# Patient Record
Sex: FEMALE | Race: BLACK OR AFRICAN AMERICAN | Employment: OTHER | URBAN - METROPOLITAN AREA
[De-identification: names, ages, dates, MRNs, and addresses within clinical notes are randomized per-mention and may not be internally consistent; named-entity substitution may affect disease eponyms.]

---

## 2017-01-16 PROBLEM — S32.000A CLOSED COMPRESSION FRACTURE OF LUMBAR VERTEBRA (HCC): Status: ACTIVE | Noted: 2017-01-16

## 2017-03-24 PROBLEM — S32.000A CLOSED COMPRESSION FRACTURE OF LUMBAR VERTEBRA (HCC): Status: ACTIVE | Noted: 2017-03-24

## 2017-03-27 ENCOUNTER — TELEPHONE (OUTPATIENT)
Dept: PHARMACY | Facility: CLINIC | Age: 82
End: 2017-03-27

## 2017-08-01 PROBLEM — E11.9 TYPE 2 DIABETES MELLITUS WITHOUT COMPLICATION, WITHOUT LONG-TERM CURRENT USE OF INSULIN (HCC): Status: ACTIVE | Noted: 2017-08-01

## 2017-08-16 PROBLEM — E11.9 TYPE 2 DIABETES MELLITUS WITHOUT COMPLICATION, WITHOUT LONG-TERM CURRENT USE OF INSULIN (HCC): Chronic | Status: ACTIVE | Noted: 2017-08-01

## 2017-11-06 PROBLEM — M19.90 OSTEOARTHRITIS: Status: ACTIVE | Noted: 2017-11-06

## 2018-01-12 ENCOUNTER — CARE COORDINATION (OUTPATIENT)
Dept: CARE COORDINATION | Age: 83
End: 2018-01-12

## 2018-01-12 NOTE — CARE COORDINATION
Attempted to reach patient for ed follow up, no answer, message states person you are trying to reach is unavailable no voicemail available

## 2018-04-11 ENCOUNTER — CARE COORDINATION (OUTPATIENT)
Dept: CARE COORDINATION | Age: 83
End: 2018-04-11

## 2018-05-09 ENCOUNTER — CARE COORDINATION (OUTPATIENT)
Dept: CARE COORDINATION | Age: 83
End: 2018-05-09

## 2018-05-21 ENCOUNTER — HOSPITAL ENCOUNTER (EMERGENCY)
Age: 83
Discharge: HOME | End: 2018-05-21
Payer: MEDICAID

## 2018-05-21 VITALS
TEMPERATURE: 98 F | WEIGHT: 120 LBS | HEART RATE: 101 BPM | SYSTOLIC BLOOD PRESSURE: 179 MMHG | DIASTOLIC BLOOD PRESSURE: 99 MMHG | OXYGEN SATURATION: 96 % | BODY MASS INDEX: 21.26 KG/M2 | RESPIRATION RATE: 18 BRPM

## 2018-05-21 DIAGNOSIS — H01.113 ALLERGIC CONTACT DERMATITIS OF EYELIDS OF BOTH EYES: Primary | ICD-10-CM

## 2018-05-21 DIAGNOSIS — H01.116 ALLERGIC CONTACT DERMATITIS OF EYELIDS OF BOTH EYES: Primary | ICD-10-CM

## 2018-05-21 PROCEDURE — 99283 EMERGENCY DEPT VISIT LOW MDM: CPT

## 2018-05-21 PROCEDURE — 6370000000 HC RX 637 (ALT 250 FOR IP): Performed by: NURSE PRACTITIONER

## 2018-05-21 RX ORDER — METHYLPREDNISOLONE 4 MG/1
TABLET ORAL
Qty: 21 TABLET | Status: SHIPPED | OUTPATIENT
Start: 2018-05-21 | End: 2018-05-27

## 2018-05-21 RX ORDER — DIPHENHYDRAMINE HCL 25 MG
25 CAPSULE ORAL EVERY 6 HOURS PRN
Qty: 21 CAPSULE | Refills: 0 | Status: SHIPPED | OUTPATIENT
Start: 2018-05-21 | End: 2018-05-31

## 2018-05-21 RX ORDER — DIPHENHYDRAMINE HCL 25 MG
25 TABLET ORAL ONCE
Status: COMPLETED | OUTPATIENT
Start: 2018-05-21 | End: 2018-05-21

## 2018-05-21 RX ORDER — ERYTHROMYCIN 5 MG/G
OINTMENT OPHTHALMIC
Qty: 1 TUBE | Refills: 0 | Status: SHIPPED | OUTPATIENT
Start: 2018-05-21 | End: 2018-05-31

## 2018-05-21 RX ORDER — PREDNISONE 20 MG/1
40 TABLET ORAL ONCE
Status: COMPLETED | OUTPATIENT
Start: 2018-05-21 | End: 2018-05-21

## 2018-05-21 RX ADMIN — DIPHENHYDRAMINE HCL 25 MG: 25 TABLET ORAL at 16:32

## 2018-05-21 RX ADMIN — PREDNISONE 40 MG: 20 TABLET ORAL at 16:32

## 2018-05-24 ENCOUNTER — CARE COORDINATION (OUTPATIENT)
Dept: CARE COORDINATION | Age: 83
End: 2018-05-24

## 2018-05-29 ENCOUNTER — CARE COORDINATION (OUTPATIENT)
Dept: CARE COORDINATION | Age: 83
End: 2018-05-29

## 2018-06-14 ENCOUNTER — TELEPHONE (OUTPATIENT)
Dept: INTERNAL MEDICINE | Age: 83
End: 2018-06-14

## 2018-06-14 ENCOUNTER — CARE COORDINATION (OUTPATIENT)
Dept: CARE COORDINATION | Age: 83
End: 2018-06-14

## 2018-07-12 ENCOUNTER — TELEPHONE (OUTPATIENT)
Dept: INTERNAL MEDICINE | Age: 83
End: 2018-07-12

## 2018-07-12 NOTE — TELEPHONE ENCOUNTER
called pharmacist and informed that we can not refill pts meds because she has not been seen in more than 6 months and no future appt is scheduled  Attempted to call pt  No answer

## 2018-07-16 ENCOUNTER — HOSPITAL ENCOUNTER (OUTPATIENT)
Age: 83
Discharge: HOME | End: 2018-07-16
Payer: COMMERCIAL

## 2018-07-16 ENCOUNTER — OFFICE VISIT (OUTPATIENT)
Dept: INTERNAL MEDICINE | Age: 83
End: 2018-07-16
Payer: COMMERCIAL

## 2018-07-16 VITALS
SYSTOLIC BLOOD PRESSURE: 186 MMHG | TEMPERATURE: 97.7 F | RESPIRATION RATE: 16 BRPM | BODY MASS INDEX: 21.09 KG/M2 | HEART RATE: 72 BPM | HEIGHT: 63 IN | WEIGHT: 119 LBS | DIASTOLIC BLOOD PRESSURE: 87 MMHG

## 2018-07-16 DIAGNOSIS — I10 ESSENTIAL HYPERTENSION: ICD-10-CM

## 2018-07-16 DIAGNOSIS — E11.9 TYPE 2 DIABETES MELLITUS WITHOUT COMPLICATION, WITHOUT LONG-TERM CURRENT USE OF INSULIN (HCC): Primary | Chronic | ICD-10-CM

## 2018-07-16 DIAGNOSIS — E11.9 TYPE 2 DIABETES MELLITUS WITHOUT COMPLICATION, WITHOUT LONG-TERM CURRENT USE OF INSULIN (HCC): Chronic | ICD-10-CM

## 2018-07-16 LAB
ANION GAP SERPL CALCULATED.3IONS-SCNC: 13 MMOL/L (ref 7–16)
BUN BLDV-MCNC: 18 MG/DL (ref 8–23)
CALCIUM SERPL-MCNC: 9.4 MG/DL (ref 8.6–10.2)
CHLORIDE BLD-SCNC: 102 MMOL/L (ref 98–107)
CO2: 25 MMOL/L (ref 22–29)
CREAT SERPL-MCNC: 1.3 MG/DL (ref 0.5–1)
GFR AFRICAN AMERICAN: 47
GFR NON-AFRICAN AMERICAN: 47 ML/MIN/1.73
GLUCOSE BLD-MCNC: 85 MG/DL (ref 74–109)
HBA1C MFR BLD: 6.1 % (ref 4–5.6)
POTASSIUM SERPL-SCNC: 4.7 MMOL/L (ref 3.5–5)
SODIUM BLD-SCNC: 140 MMOL/L (ref 132–146)

## 2018-07-16 PROCEDURE — 80048 BASIC METABOLIC PNL TOTAL CA: CPT

## 2018-07-16 PROCEDURE — G8420 CALC BMI NORM PARAMETERS: HCPCS | Performed by: INTERNAL MEDICINE

## 2018-07-16 PROCEDURE — 99213 OFFICE O/P EST LOW 20 MIN: CPT | Performed by: INTERNAL MEDICINE

## 2018-07-16 PROCEDURE — G8427 DOCREV CUR MEDS BY ELIG CLIN: HCPCS | Performed by: INTERNAL MEDICINE

## 2018-07-16 PROCEDURE — 4040F PNEUMOC VAC/ADMIN/RCVD: CPT | Performed by: INTERNAL MEDICINE

## 2018-07-16 PROCEDURE — 1123F ACP DISCUSS/DSCN MKR DOCD: CPT | Performed by: INTERNAL MEDICINE

## 2018-07-16 PROCEDURE — G8399 PT W/DXA RESULTS DOCUMENT: HCPCS | Performed by: INTERNAL MEDICINE

## 2018-07-16 PROCEDURE — 1090F PRES/ABSN URINE INCON ASSESS: CPT | Performed by: INTERNAL MEDICINE

## 2018-07-16 PROCEDURE — 1101F PT FALLS ASSESS-DOCD LE1/YR: CPT | Performed by: INTERNAL MEDICINE

## 2018-07-16 PROCEDURE — 36415 COLL VENOUS BLD VENIPUNCTURE: CPT

## 2018-07-16 PROCEDURE — 83036 HEMOGLOBIN GLYCOSYLATED A1C: CPT

## 2018-07-16 PROCEDURE — 1036F TOBACCO NON-USER: CPT | Performed by: INTERNAL MEDICINE

## 2018-07-16 PROCEDURE — 99212 OFFICE O/P EST SF 10 MIN: CPT | Performed by: INTERNAL MEDICINE

## 2018-07-16 RX ORDER — VERAPAMIL HYDROCHLORIDE 240 MG/1
240 TABLET, FILM COATED, EXTENDED RELEASE ORAL NIGHTLY
Qty: 30 TABLET | Refills: 1 | Status: SHIPPED | OUTPATIENT
Start: 2018-07-16 | End: 2018-08-15 | Stop reason: SDUPTHER

## 2018-07-16 RX ORDER — HYDRALAZINE HYDROCHLORIDE 50 MG/1
100 TABLET, FILM COATED ORAL 3 TIMES DAILY
Qty: 90 TABLET | Refills: 1 | Status: SHIPPED | OUTPATIENT
Start: 2018-07-16 | End: 2018-08-21 | Stop reason: SDUPTHER

## 2018-07-16 RX ORDER — LOSARTAN POTASSIUM 50 MG/1
50 TABLET ORAL DAILY
Qty: 30 TABLET | Refills: 1 | Status: SHIPPED | OUTPATIENT
Start: 2018-07-16 | End: 2018-09-14 | Stop reason: SDUPTHER

## 2018-07-16 ASSESSMENT — PATIENT HEALTH QUESTIONNAIRE - PHQ9
SUM OF ALL RESPONSES TO PHQ QUESTIONS 1-9: 0
2. FEELING DOWN, DEPRESSED OR HOPELESS: 0
SUM OF ALL RESPONSES TO PHQ9 QUESTIONS 1 & 2: 0
1. LITTLE INTEREST OR PLEASURE IN DOING THINGS: 0

## 2018-07-16 NOTE — PROGRESS NOTES
Clinic note  Internal Medicine residency    S:  Caron Mireles is a 81 y.o. female who presents to the Care clinic for check up. She has a history of HTN, DM, COPD. She hasn't been in the clinic since Sept last year. Came today because she ran out of her BP meds. She is taking aspirin, hydralazin 50 mg TID, Trazodone 25 mg daily, Verapamil 240 mg daily. Says she didn't ran out of any medications, because se was given few pills to last until today from the drug store. Says she has a mild headache right now. No blurry vision or chest pain.       ROS:  GEN: no fevers or chills  CVS: no chest pain or palpitations  PULM: no shortness of breath or wheezes  GI: no nausea, vomiting, or diarrhea  EXT: no swelling or tenderness  NEURO: no weakness, numbness, or headache    O:  Vitals:    07/16/18 1119 07/16/18 1126   BP: (!) 174/79 (!) 186/87   Site: Left Arm    Position: Sitting    Cuff Size: Small Adult    Pulse: 72    Resp: 16    Temp: 97.7 °F (36.5 °C)    TempSrc: Oral    Weight: 119 lb (54 kg)    Height: 5' 3\" (1.6 m)      GEN: AAO x3, NAD  CVS: RRR, no murmurs  PULM: CTAB, no wheezes  ABD: Soft, non-tender, bowel sounds normoactive  EXT: Distal pulses intact, no edema    A&P:  Uncontrolled HTN  - BP elevated  - Verapemil 240, Hydralazine 50 TID increase to 100 TIC, Add losartan 50 mg daily  - BMP    DM  - Last A1C 5.5 back in Sept 2017, rpt   - Not on medications     Hx of COPD  - No PFT on file     RTC in 2 weeks     Felipa Camacho MD PGY-3  Internal Medicine     Attending: Dr. Naila Calvillo

## 2018-07-16 NOTE — PATIENT INSTRUCTIONS
Patient Education        Preventing Falls: Care Instructions  Your Care Instructions    Getting around your home safely can be a challenge if you have injuries or health problems that make it easy for you to fall. Loose rugs and furniture in walkways are among the dangers for many older people who have problems walking or who have poor eyesight. People who have conditions such as arthritis, osteoporosis, or dementia also have to be careful not to fall. You can make your home safer with a few simple measures. Follow-up care is a key part of your treatment and safety. Be sure to make and go to all appointments, and call your doctor if you are having problems. It's also a good idea to know your test results and keep a list of the medicines you take. How can you care for yourself at home? Taking care of yourself  · You may get dizzy if you do not drink enough water. To prevent dehydration, drink plenty of fluids, enough so that your urine is light yellow or clear like water. Choose water and other caffeine-free clear liquids. If you have kidney, heart, or liver disease and have to limit fluids, talk with your doctor before you increase the amount of fluids you drink. · Exercise regularly to improve your strength, muscle tone, and balance. Walk if you can. Swimming may be a good choice if you cannot walk easily. · Have your vision and hearing checked each year or any time you notice a change. If you have trouble seeing and hearing, you might not be able to avoid objects and could lose your balance. · Know the side effects of the medicines you take. Ask your doctor or pharmacist whether the medicines you take can affect your balance. Sleeping pills or sedatives can affect your balance. · Limit the amount of alcohol you drink. Alcohol can impair your balance and other senses. · Ask your doctor whether calluses or corns on your feet need to be removed.  If you wear loose-fitting shoes because of calluses or corns, you can lose your balance and fall. · Talk to your doctor if you have numbness in your feet. Preventing falls at home  · Remove raised doorway thresholds, throw rugs, and clutter. Repair loose carpet or raised areas in the floor. · Move furniture and electrical cords to keep them out of walking paths. · Use nonskid floor wax, and wipe up spills right away, especially on ceramic tile floors. · If you use a walker or cane, put rubber tips on it. If you use crutches, clean the bottoms of them regularly with an abrasive pad, such as steel wool. · Keep your house well lit, especially [place], and outside walkways. Use night-lights in areas such as hallways and bathrooms. Add extra light switches or use remote switches (such as switches that go on or off when you clap your hands) to make it easier to turn lights on if you have to get up during the night. · Install sturdy handrails on stairways. · Move items in your cabinets so that the things you use a lot are on the lower shelves (about waist level). · Keep a cordless phone and a flashlight with new batteries by your bed. If possible, put a phone in each of the main rooms of your house, or carry a cell phone in case you fall and cannot reach a phone. Or, you can wear a device around your neck or wrist. You push a button that sends a signal for help. · Wear low-heeled shoes that fit well and give your feet good support. Use footwear with nonskid soles. Check the heels and soles of your shoes for wear. Repair or replace worn heels or soles. · Do not wear socks without shoes on wood floors. · Walk on the grass when the sidewalks are slippery. If you live in an area that gets snow and ice in the winter, sprinkle salt on slippery steps and sidewalks. Preventing falls in the bath  · Install grab bars and nonskid mats inside and outside your shower or tub and near the toilet and sinks. · Use shower chairs and bath benches.   · Use a hand-held shower head that will allow you to sit while showering. · Get into a tub or shower by putting the weaker leg in first. Get out of a tub or shower with your strong side first.  · Repair loose toilet seats and consider installing a raised toilet seat to make getting on and off the toilet easier. · Keep your bathroom door unlocked while you are in the shower. Where can you learn more? Go to https://[employer]nanoMR. org and sign in to your [employer] account. Enter [phone] in the Happlink box to learn more about \"Preventing Falls: Care Instructions. \"     If you do not have an account, please click on the \"Sign Up Now\" link. Current as of: May 12, 2017  Content Version: 11.6  © 0245-3441 [employer]. Care instructions adapted under license by [facility] [place] ([facility]). If you have questions about a medical condition or this instruction, always ask your healthcare professional. [place] [address] any warranty or liability for your use of this information.      1) Cont medications  2) Return in 2 weeks     Tereso Negron MD   Internal Medicine

## 2018-07-17 ENCOUNTER — TELEPHONE (OUTPATIENT)
Dept: INTERNAL MEDICINE | Age: 83
End: 2018-07-17

## 2018-07-17 NOTE — TELEPHONE ENCOUNTER
Called patient to discuss problems with cozaar, but patient unavailable so left message for her to please call back. Message also left that she can come into clinic tomorrow morning or afternoon, whatever woks best for her schedule so that we can reassess blood pressure. She mentioned in her message that she may go to emergency room, and if necessary message left to do so.

## 2018-07-17 NOTE — TELEPHONE ENCOUNTER
----- Message from Marella Dubin sent at 7/17/2018  9:36 AM EDT -----  Pt states cozaar made her sick and wonders why she is even taking it states why is she taking 3 bp pills.  States she may be going to the er

## 2018-08-15 DIAGNOSIS — I10 ESSENTIAL HYPERTENSION: ICD-10-CM

## 2018-08-16 DIAGNOSIS — I10 ESSENTIAL HYPERTENSION: ICD-10-CM

## 2018-08-16 RX ORDER — VERAPAMIL HYDROCHLORIDE 240 MG/1
240 TABLET, FILM COATED, EXTENDED RELEASE ORAL NIGHTLY
Qty: 30 TABLET | Refills: 0 | Status: SHIPPED | OUTPATIENT
Start: 2018-08-16 | End: 2018-08-16 | Stop reason: SDUPTHER

## 2018-08-16 RX ORDER — VERAPAMIL HYDROCHLORIDE 240 MG/1
240 TABLET, FILM COATED, EXTENDED RELEASE ORAL NIGHTLY
Qty: 90 TABLET | Refills: 0 | Status: SHIPPED | OUTPATIENT
Start: 2018-08-16 | End: 2018-11-01 | Stop reason: SDUPTHER

## 2018-08-21 ENCOUNTER — TELEPHONE (OUTPATIENT)
Dept: INTERNAL MEDICINE | Age: 83
End: 2018-08-21

## 2018-08-21 DIAGNOSIS — I10 ESSENTIAL HYPERTENSION: ICD-10-CM

## 2018-08-21 RX ORDER — HYDRALAZINE HYDROCHLORIDE 100 MG/1
100 TABLET, FILM COATED ORAL 3 TIMES DAILY
Qty: 90 TABLET | Refills: 1 | Status: SHIPPED | OUTPATIENT
Start: 2018-08-21 | End: 2018-11-01 | Stop reason: SDUPTHER

## 2018-08-21 RX ORDER — HYDRALAZINE HYDROCHLORIDE 100 MG/1
100 TABLET, FILM COATED ORAL 3 TIMES DAILY
Qty: 90 TABLET | Refills: 1 | Status: CANCELLED | OUTPATIENT
Start: 2018-08-21

## 2018-08-21 NOTE — TELEPHONE ENCOUNTER
Pt called and states she needs a refill on her bp med.  Pt advised to leave message on nurse line pt states she is having trouble doing that

## 2018-08-21 NOTE — TELEPHONE ENCOUNTER
Pharmacist states that this pt needs refill for her hydralazine  Requests that the 50mg pill be changed to 100mg pill

## 2018-09-11 ENCOUNTER — HOSPITAL ENCOUNTER (EMERGENCY)
Age: 83
Discharge: HOME | End: 2018-09-11
Payer: MEDICAID

## 2018-09-11 VITALS
TEMPERATURE: 97.2 F | SYSTOLIC BLOOD PRESSURE: 144 MMHG | BODY MASS INDEX: 20.32 KG/M2 | HEIGHT: 64 IN | HEART RATE: 87 BPM | RESPIRATION RATE: 14 BRPM | WEIGHT: 119 LBS | OXYGEN SATURATION: 98 % | DIASTOLIC BLOOD PRESSURE: 75 MMHG

## 2018-09-11 DIAGNOSIS — R20.2 PARESTHESIAS: Primary | ICD-10-CM

## 2018-09-11 LAB
CHP ED QC CHECK: NORMAL
GLUCOSE BLD-MCNC: 104 MG/DL
METER GLUCOSE: 104 MG/DL (ref 70–110)

## 2018-09-11 PROCEDURE — 99283 EMERGENCY DEPT VISIT LOW MDM: CPT

## 2018-09-11 PROCEDURE — 82962 GLUCOSE BLOOD TEST: CPT

## 2018-09-11 ASSESSMENT — PAIN DESCRIPTION - LOCATION: LOCATION: KNEE

## 2018-09-11 ASSESSMENT — PAIN DESCRIPTION - ORIENTATION: ORIENTATION: RIGHT

## 2018-09-11 ASSESSMENT — PAIN DESCRIPTION - PAIN TYPE: TYPE: CHRONIC PAIN

## 2018-09-11 NOTE — ED PROVIDER NOTES
she quit smoking about 4 years ago. Her smoking use included Cigars. She has a 5.00 pack-year smoking history. She has never used smokeless tobacco. She reports that she does not drink alcohol or use drugs. Family History: family history is not on file. Allergies: Patient has no known allergies. Physical Exam           ED Triage Vitals [09/11/18 1211]   BP Temp Temp src Pulse Resp SpO2 Height Weight   (!) 151/78 97.4 °F (36.3 °C) -- 90 16 99 % 5' 4\" (1.626 m) 119 lb (54 kg)      Oxygen Saturation Interpretation: Normal.    Constitutional:  Alert, development consistent with age. Eyes:  No discharge or conjunctival injection. Ears:  External ears without lesions. Throat:  Airway patient. Neck:  Normal ROM. Supple. Respiratory:  Clear to auscultation and breath sounds equal.  CV:  Regular rate and rhythm, normal heart sounds, without pathological murmurs, ectopy, gallops, or rubs. Integument:  Normal turgor. Warm, dry, without visible rash, unless noted elsewhere. Lymphatics: No lymphangitis or adenopathy noted. Neurological:  Oriented. Motor functions intact. Bilateral decreased sensation of the legs from the mid shin and distally. Lab / Imaging Results   (All laboratory and radiology results have been personally reviewed by myself)  Labs:  Results for orders placed or performed during the hospital encounter of 09/11/18   POCT Glucose   Result Value Ref Range    Glucose 104 mg/dL    QC OK? y    POCT Glucose   Result Value Ref Range    Meter Glucose 104 70 - 110 mg/dL     Imaging: All Radiology results interpreted by Radiologist unless otherwise noted. No orders to display       ED Course / Medical Decision Making   Medications - No data to display     Consultations:             None    Procedures:   none    MDM:  Patient presents to the emergency department for concerns of hyperglycemia. Point of care glucose was obtained and normal.  Vital signs are stable.   Based on her provided history, her

## 2018-09-14 ENCOUNTER — OFFICE VISIT (OUTPATIENT)
Dept: INTERNAL MEDICINE | Age: 83
End: 2018-09-14
Payer: MEDICAID

## 2018-09-14 VITALS
HEIGHT: 63 IN | HEART RATE: 94 BPM | BODY MASS INDEX: 21.19 KG/M2 | WEIGHT: 119.6 LBS | DIASTOLIC BLOOD PRESSURE: 70 MMHG | SYSTOLIC BLOOD PRESSURE: 164 MMHG | RESPIRATION RATE: 20 BRPM | TEMPERATURE: 98.3 F

## 2018-09-14 DIAGNOSIS — M19.90 OSTEOARTHRITIS, UNSPECIFIED OSTEOARTHRITIS TYPE, UNSPECIFIED SITE: ICD-10-CM

## 2018-09-14 DIAGNOSIS — I10 ESSENTIAL HYPERTENSION: Primary | Chronic | ICD-10-CM

## 2018-09-14 DIAGNOSIS — E11.9 TYPE 2 DIABETES MELLITUS WITHOUT COMPLICATION, WITHOUT LONG-TERM CURRENT USE OF INSULIN (HCC): ICD-10-CM

## 2018-09-14 DIAGNOSIS — Z09 ENCOUNTER FOR FOLLOW-UP EXAMINATION AFTER COMPLETED TREATMENT FOR CONDITIONS OTHER THAN MALIGNANT NEOPLASM: ICD-10-CM

## 2018-09-14 DIAGNOSIS — R73.03 PREDIABETES: ICD-10-CM

## 2018-09-14 DIAGNOSIS — Z13.820 SCREENING FOR OSTEOPOROSIS: ICD-10-CM

## 2018-09-14 DIAGNOSIS — S32.040D CLOSED COMPRESSION FRACTURE OF L4 LUMBAR VERTEBRA WITH ROUTINE HEALING, SUBSEQUENT ENCOUNTER: ICD-10-CM

## 2018-09-14 DIAGNOSIS — M75.02 ADHESIVE CAPSULITIS OF LEFT SHOULDER: ICD-10-CM

## 2018-09-14 PROCEDURE — 1101F PT FALLS ASSESS-DOCD LE1/YR: CPT | Performed by: INTERNAL MEDICINE

## 2018-09-14 PROCEDURE — G8420 CALC BMI NORM PARAMETERS: HCPCS | Performed by: INTERNAL MEDICINE

## 2018-09-14 PROCEDURE — 1090F PRES/ABSN URINE INCON ASSESS: CPT | Performed by: INTERNAL MEDICINE

## 2018-09-14 PROCEDURE — G8399 PT W/DXA RESULTS DOCUMENT: HCPCS | Performed by: INTERNAL MEDICINE

## 2018-09-14 PROCEDURE — 99213 OFFICE O/P EST LOW 20 MIN: CPT | Performed by: INTERNAL MEDICINE

## 2018-09-14 PROCEDURE — 1123F ACP DISCUSS/DSCN MKR DOCD: CPT | Performed by: INTERNAL MEDICINE

## 2018-09-14 PROCEDURE — 1036F TOBACCO NON-USER: CPT | Performed by: INTERNAL MEDICINE

## 2018-09-14 PROCEDURE — G8427 DOCREV CUR MEDS BY ELIG CLIN: HCPCS | Performed by: INTERNAL MEDICINE

## 2018-09-14 PROCEDURE — 99212 OFFICE O/P EST SF 10 MIN: CPT | Performed by: INTERNAL MEDICINE

## 2018-09-14 PROCEDURE — 4040F PNEUMOC VAC/ADMIN/RCVD: CPT | Performed by: INTERNAL MEDICINE

## 2018-09-14 RX ORDER — ASPIRIN 81 MG/1
TABLET ORAL
Qty: 30 TABLET | Refills: 3 | Status: SHIPPED | OUTPATIENT
Start: 2018-09-14 | End: 2018-09-14 | Stop reason: SDUPTHER

## 2018-09-14 RX ORDER — LOSARTAN POTASSIUM 50 MG/1
50 TABLET ORAL DAILY
Qty: 30 TABLET | Refills: 3 | Status: SHIPPED | OUTPATIENT
Start: 2018-09-14 | End: 2018-11-09 | Stop reason: SDUPTHER

## 2018-09-14 ASSESSMENT — ENCOUNTER SYMPTOMS
SHORTNESS OF BREATH: 0
NAUSEA: 0
BACK PAIN: 1
WHEEZING: 0
BLOOD IN STOOL: 0
VOMITING: 0
CONSTIPATION: 1
ABDOMINAL PAIN: 0
DIARRHEA: 0
COUGH: 0

## 2018-09-14 NOTE — PATIENT INSTRUCTIONS
A Dexa Scan will be ordered for you they will notify you of the appointment  Referral to Physical Therapy will be made   Return as scheduled  Medications have been ordered take as instructed

## 2018-09-14 NOTE — PROGRESS NOTES
Dispense Refill    losartan (COZAAR) 50 MG tablet Take 1 tablet by mouth daily 30 tablet 3    aspirin (ASPIRIN LOW DOSE) 81 MG EC tablet TAKE 1 TABLET BY MOUTH EVERY DAY 30 tablet 3    hydrALAZINE (APRESOLINE) 100 MG tablet Take 1 tablet by mouth 3 times daily 90 tablet 1    verapamil (CALAN SR) 240 MG extended release tablet Take 1 tablet by mouth nightly 90 tablet 0    acetaminophen (TYLENOL 8 HOUR ARTHRITIS PAIN) 650 MG extended release tablet Take 650 mg by mouth every 8 hours as needed for Pain      LUMIGAN 0.01 % SOLN ophthalmic drops PLACE 1 GTT INTO OU QHS  11    Calcium Carbonate 500 MG CHEW Take 1 tablet by mouth daily 30 tablet 2       I have reviewed all pertinent PMHx, PSHx, FamHx, SocialHx, medications, and allergies and updated history as appropriate. OBJECTIVE:    VS: BP (!) 164/70   Pulse 94   Temp 98.3 °F (36.8 °C) (Oral)   Resp 20   Ht 5' 3\" (1.6 m)   Wt 119 lb 9.6 oz (54.3 kg)   BMI 21.19 kg/m²   Physical Exam   Constitutional: She is oriented to person, place, and time. She appears well-developed and well-nourished. No distress. HENT:   Head: Normocephalic and atraumatic. Eyes: Pupils are equal, round, and reactive to light. Conjunctivae are normal. No scleral icterus. Neck: Normal range of motion. Neck supple. Cardiovascular: Normal rate and regular rhythm. Murmur heard. Pulmonary/Chest: Effort normal and breath sounds normal. No respiratory distress. She has no wheezes. She has no rales. She exhibits no tenderness. Abdominal: Soft. Bowel sounds are normal. She exhibits no distension. There is no tenderness. Musculoskeletal: She exhibits tenderness. She exhibits no edema. Tenderness in lateral portion of right thigh    Tenderness in coccygeal region    Tenderness and stiffness in left shoulder joint with decreased ROM   Neurological: She is alert and oriented to person, place, and time. Skin: Skin is warm and dry. She is not diaphoretic. ASSESSMENT/PLAN:  1. Essential hypertension  - poorly controlled  - continue on current medication    2. Adhesive capsulitis of left shoulder  - for PT  - consider referral to Orthopedic surgery    3. Screening for osteoporosis  - for repeat DEXA scan    4. Closed compression fracture of L2 with multi-level degenerative changes  - continue on Tylenol for pain management  - for PT     5.  Prediabetes  - for repeat HbA1C at follow-up  - counseled on cutting out sodas and sweets from diet        I have reviewed my findings and recommendations with Nolia Leventhal and Dr Chuckie Mcguire MD PGY-1   9/14/2018 6:35 PM

## 2018-09-17 RX ORDER — ASPIRIN 81 MG/1
TABLET, DELAYED RELEASE ORAL
Qty: 90 TABLET | Refills: 1 | Status: SHIPPED | OUTPATIENT
Start: 2018-09-17 | End: 2019-01-16 | Stop reason: SDUPTHER

## 2018-09-21 ENCOUNTER — HOSPITAL ENCOUNTER (OUTPATIENT)
Dept: GENERAL RADIOLOGY | Age: 83
Discharge: HOME | End: 2018-09-23
Payer: COMMERCIAL

## 2018-09-21 DIAGNOSIS — Z09 ENCOUNTER FOR FOLLOW-UP EXAMINATION AFTER COMPLETED TREATMENT FOR CONDITIONS OTHER THAN MALIGNANT NEOPLASM: ICD-10-CM

## 2018-09-21 DIAGNOSIS — Z13.820 SCREENING FOR OSTEOPOROSIS: ICD-10-CM

## 2018-09-21 DIAGNOSIS — S32.040D CLOSED COMPRESSION FRACTURE OF L4 LUMBAR VERTEBRA WITH ROUTINE HEALING, SUBSEQUENT ENCOUNTER: ICD-10-CM

## 2018-09-21 PROCEDURE — 77080 DXA BONE DENSITY AXIAL: CPT

## 2018-09-25 ENCOUNTER — TELEPHONE (OUTPATIENT)
Dept: INTERNAL MEDICINE | Age: 83
End: 2018-09-25

## 2018-10-23 ENCOUNTER — HOSPITAL ENCOUNTER (OUTPATIENT)
Age: 83
Setting detail: OBSERVATION
Discharge: HOME | End: 2018-10-24
Attending: EMERGENCY MEDICINE | Admitting: INTERNAL MEDICINE
Payer: COMMERCIAL

## 2018-10-23 ENCOUNTER — APPOINTMENT (OUTPATIENT)
Dept: GENERAL RADIOLOGY | Age: 83
End: 2018-10-23
Payer: COMMERCIAL

## 2018-10-23 DIAGNOSIS — R07.9 CHEST PAIN, UNSPECIFIED TYPE: Primary | ICD-10-CM

## 2018-10-23 DIAGNOSIS — D50.0 IRON DEFICIENCY ANEMIA DUE TO CHRONIC BLOOD LOSS: ICD-10-CM

## 2018-10-23 LAB
ALBUMIN SERPL-MCNC: 3.7 G/DL (ref 3.5–5.2)
ALP BLD-CCNC: 119 U/L (ref 35–104)
ALT SERPL-CCNC: 14 U/L (ref 0–32)
ANION GAP SERPL CALCULATED.3IONS-SCNC: 12 MMOL/L (ref 7–16)
AST SERPL-CCNC: 38 U/L (ref 0–31)
BASOPHILS ABSOLUTE: 0.05 E9/L (ref 0–0.2)
BASOPHILS RELATIVE PERCENT: 0.8 % (ref 0–2)
BILIRUB SERPL-MCNC: <0.2 MG/DL (ref 0–1.2)
BUN BLDV-MCNC: 18 MG/DL (ref 8–23)
CALCIUM SERPL-MCNC: 9.6 MG/DL (ref 8.6–10.2)
CHLORIDE BLD-SCNC: 103 MMOL/L (ref 98–107)
CO2: 26 MMOL/L (ref 22–29)
CREAT SERPL-MCNC: 1.3 MG/DL (ref 0.5–1)
EOSINOPHILS ABSOLUTE: 0.25 E9/L (ref 0.05–0.5)
EOSINOPHILS RELATIVE PERCENT: 4 % (ref 0–6)
GFR AFRICAN AMERICAN: 47
GFR NON-AFRICAN AMERICAN: 47 ML/MIN/1.73
GLUCOSE BLD-MCNC: 86 MG/DL (ref 74–109)
HCT VFR BLD CALC: 27.6 % (ref 34–48)
HEMOGLOBIN: 8.5 G/DL (ref 11.5–15.5)
IMMATURE GRANULOCYTES #: 0.02 E9/L
IMMATURE GRANULOCYTES %: 0.3 % (ref 0–5)
LYMPHOCYTES ABSOLUTE: 1.89 E9/L (ref 1.5–4)
LYMPHOCYTES RELATIVE PERCENT: 30 % (ref 20–42)
MCH RBC QN AUTO: 27.3 PG (ref 26–35)
MCHC RBC AUTO-ENTMCNC: 30.8 % (ref 32–34.5)
MCV RBC AUTO: 88.7 FL (ref 80–99.9)
MONOCYTES ABSOLUTE: 0.72 E9/L (ref 0.1–0.95)
MONOCYTES RELATIVE PERCENT: 11.4 % (ref 2–12)
NEUTROPHILS ABSOLUTE: 3.37 E9/L (ref 1.8–7.3)
NEUTROPHILS RELATIVE PERCENT: 53.5 % (ref 43–80)
PDW BLD-RTO: 14.6 FL (ref 11.5–15)
PLATELET # BLD: 242 E9/L (ref 130–450)
PMV BLD AUTO: 11.1 FL (ref 7–12)
POTASSIUM SERPL-SCNC: 4.8 MMOL/L (ref 3.5–5)
RBC # BLD: 3.11 E12/L (ref 3.5–5.5)
SODIUM BLD-SCNC: 141 MMOL/L (ref 132–146)
TOTAL PROTEIN: 7.6 G/DL (ref 6.4–8.3)
TROPONIN: 0.02 NG/ML (ref 0–0.03)
WBC # BLD: 6.3 E9/L (ref 4.5–11.5)

## 2018-10-23 PROCEDURE — 36415 COLL VENOUS BLD VENIPUNCTURE: CPT

## 2018-10-23 PROCEDURE — 93005 ELECTROCARDIOGRAM TRACING: CPT | Performed by: EMERGENCY MEDICINE

## 2018-10-23 PROCEDURE — 99285 EMERGENCY DEPT VISIT HI MDM: CPT

## 2018-10-23 PROCEDURE — 80053 COMPREHEN METABOLIC PANEL: CPT

## 2018-10-23 PROCEDURE — 85025 COMPLETE CBC W/AUTO DIFF WBC: CPT

## 2018-10-23 PROCEDURE — 71045 X-RAY EXAM CHEST 1 VIEW: CPT

## 2018-10-23 PROCEDURE — 84484 ASSAY OF TROPONIN QUANT: CPT

## 2018-10-23 PROCEDURE — 94761 N-INVAS EAR/PLS OXIMETRY MLT: CPT

## 2018-10-23 RX ORDER — ASPIRIN 81 MG/1
324 TABLET, CHEWABLE ORAL ONCE
Status: COMPLETED | OUTPATIENT
Start: 2018-10-23 | End: 2018-10-24

## 2018-10-23 ASSESSMENT — PAIN DESCRIPTION - PAIN TYPE
TYPE: ACUTE PAIN
TYPE: ACUTE PAIN

## 2018-10-23 ASSESSMENT — ENCOUNTER SYMPTOMS
NAUSEA: 0
SHORTNESS OF BREATH: 1
ABDOMINAL PAIN: 0
DIARRHEA: 0
BACK PAIN: 0

## 2018-10-23 ASSESSMENT — PAIN DESCRIPTION - DIRECTION: RADIATING_TOWARDS: LEFT ARM PAIN

## 2018-10-23 ASSESSMENT — PAIN DESCRIPTION - LOCATION
LOCATION: CHEST
LOCATION: CHEST

## 2018-10-23 ASSESSMENT — PAIN SCALES - GENERAL
PAINLEVEL_OUTOF10: 10
PAINLEVEL_OUTOF10: 10

## 2018-10-23 ASSESSMENT — PAIN DESCRIPTION - ORIENTATION
ORIENTATION: LEFT
ORIENTATION: LEFT

## 2018-10-23 ASSESSMENT — PAIN DESCRIPTION - DESCRIPTORS
DESCRIPTORS: SHARP;ACHING
DESCRIPTORS: SHOOTING

## 2018-10-24 ENCOUNTER — APPOINTMENT (OUTPATIENT)
Dept: NUCLEAR MEDICINE | Age: 83
End: 2018-10-24
Payer: COMMERCIAL

## 2018-10-24 ENCOUNTER — TELEPHONE (OUTPATIENT)
Dept: INTERNAL MEDICINE | Age: 83
End: 2018-10-24

## 2018-10-24 VITALS
TEMPERATURE: 97.6 F | RESPIRATION RATE: 16 BRPM | BODY MASS INDEX: 21.13 KG/M2 | SYSTOLIC BLOOD PRESSURE: 142 MMHG | HEIGHT: 63 IN | HEART RATE: 82 BPM | OXYGEN SATURATION: 99 % | DIASTOLIC BLOOD PRESSURE: 79 MMHG | WEIGHT: 119.25 LBS

## 2018-10-24 LAB
ALBUMIN SERPL-MCNC: 3.8 G/DL (ref 3.5–5.2)
ALP BLD-CCNC: 121 U/L (ref 35–104)
ALT SERPL-CCNC: 9 U/L (ref 0–32)
ANION GAP SERPL CALCULATED.3IONS-SCNC: 15 MMOL/L (ref 7–16)
AST SERPL-CCNC: 15 U/L (ref 0–31)
BASOPHILS ABSOLUTE: 0.06 E9/L (ref 0–0.2)
BASOPHILS RELATIVE PERCENT: 1 % (ref 0–2)
BILIRUB SERPL-MCNC: <0.2 MG/DL (ref 0–1.2)
BUN BLDV-MCNC: 18 MG/DL (ref 8–23)
CALCIUM SERPL-MCNC: 9.1 MG/DL (ref 8.6–10.2)
CHLORIDE BLD-SCNC: 103 MMOL/L (ref 98–107)
CHLORIDE URINE RANDOM: 51 MMOL/L
CHOLESTEROL, TOTAL: 196 MG/DL (ref 0–199)
CO2: 22 MMOL/L (ref 22–29)
CREAT SERPL-MCNC: 1.3 MG/DL (ref 0.5–1)
CREATININE URINE: 54 MG/DL (ref 29–226)
EOSINOPHILS ABSOLUTE: 0.27 E9/L (ref 0.05–0.5)
EOSINOPHILS RELATIVE PERCENT: 4.5 % (ref 0–6)
GFR AFRICAN AMERICAN: 47
GFR NON-AFRICAN AMERICAN: 47 ML/MIN/1.73
GLUCOSE BLD-MCNC: 80 MG/DL (ref 74–109)
HCT VFR BLD CALC: 29 % (ref 34–48)
HCT VFR BLD CALC: 29.9 % (ref 34–48)
HDLC SERPL-MCNC: 43 MG/DL
HEMOGLOBIN: 8.7 G/DL (ref 11.5–15.5)
HEMOGLOBIN: 8.9 G/DL (ref 11.5–15.5)
IMMATURE GRANULOCYTES #: 0.01 E9/L
IMMATURE GRANULOCYTES %: 0.2 % (ref 0–5)
LDL CHOLESTEROL CALCULATED: 110 MG/DL (ref 0–99)
LV EF: 70 %
LVEF MODALITY: NORMAL
LYMPHOCYTES ABSOLUTE: 2.12 E9/L (ref 1.5–4)
LYMPHOCYTES RELATIVE PERCENT: 35.6 % (ref 20–42)
MCH RBC QN AUTO: 26 PG (ref 26–35)
MCHC RBC AUTO-ENTMCNC: 29.8 % (ref 32–34.5)
MCV RBC AUTO: 87.4 FL (ref 80–99.9)
MONOCYTES ABSOLUTE: 0.76 E9/L (ref 0.1–0.95)
MONOCYTES RELATIVE PERCENT: 12.8 % (ref 2–12)
NEUTROPHILS ABSOLUTE: 2.74 E9/L (ref 1.8–7.3)
NEUTROPHILS RELATIVE PERCENT: 45.9 % (ref 43–80)
PDW BLD-RTO: 14.6 FL (ref 11.5–15)
PLATELET # BLD: 243 E9/L (ref 130–450)
PMV BLD AUTO: 11 FL (ref 7–12)
POTASSIUM REFLEX MAGNESIUM: 3.7 MMOL/L (ref 3.5–5)
POTASSIUM, UR: 9 MMOL/L
RBC # BLD: 3.42 E12/L (ref 3.5–5.5)
SODIUM BLD-SCNC: 140 MMOL/L (ref 132–146)
SODIUM URINE: 66 MMOL/L
TOTAL PROTEIN: 7.3 G/DL (ref 6.4–8.3)
TRIGL SERPL-MCNC: 214 MG/DL (ref 0–149)
TROPONIN: 0.02 NG/ML (ref 0–0.03)
TROPONIN: 0.03 NG/ML (ref 0–0.03)
UREA NITROGEN, UR: 270 MG/DL (ref 800–1666)
VLDLC SERPL CALC-MCNC: 43 MG/DL
WBC # BLD: 6 E9/L (ref 4.5–11.5)

## 2018-10-24 PROCEDURE — 78452 HT MUSCLE IMAGE SPECT MULT: CPT

## 2018-10-24 PROCEDURE — 3430000000 HC RX DIAGNOSTIC RADIOPHARMACEUTICAL: Performed by: RADIOLOGY

## 2018-10-24 PROCEDURE — 2580000003 HC RX 258: Performed by: INTERNAL MEDICINE

## 2018-10-24 PROCEDURE — 85018 HEMOGLOBIN: CPT

## 2018-10-24 PROCEDURE — G0378 HOSPITAL OBSERVATION PER HR: HCPCS

## 2018-10-24 PROCEDURE — 93017 CV STRESS TEST TRACING ONLY: CPT

## 2018-10-24 PROCEDURE — 6370000000 HC RX 637 (ALT 250 FOR IP): Performed by: INTERNAL MEDICINE

## 2018-10-24 PROCEDURE — 80053 COMPREHEN METABOLIC PANEL: CPT

## 2018-10-24 PROCEDURE — 93016 CV STRESS TEST SUPVJ ONLY: CPT | Performed by: INTERNAL MEDICINE

## 2018-10-24 PROCEDURE — 84133 ASSAY OF URINE POTASSIUM: CPT

## 2018-10-24 PROCEDURE — 84300 ASSAY OF URINE SODIUM: CPT

## 2018-10-24 PROCEDURE — 6370000000 HC RX 637 (ALT 250 FOR IP): Performed by: EMERGENCY MEDICINE

## 2018-10-24 PROCEDURE — 93018 CV STRESS TEST I&R ONLY: CPT | Performed by: INTERNAL MEDICINE

## 2018-10-24 PROCEDURE — 36415 COLL VENOUS BLD VENIPUNCTURE: CPT

## 2018-10-24 PROCEDURE — 6360000002 HC RX W HCPCS: Performed by: INTERNAL MEDICINE

## 2018-10-24 PROCEDURE — 85014 HEMATOCRIT: CPT

## 2018-10-24 PROCEDURE — 85025 COMPLETE CBC W/AUTO DIFF WBC: CPT

## 2018-10-24 PROCEDURE — 80061 LIPID PANEL: CPT

## 2018-10-24 PROCEDURE — 84484 ASSAY OF TROPONIN QUANT: CPT

## 2018-10-24 PROCEDURE — 84540 ASSAY OF URINE/UREA-N: CPT

## 2018-10-24 PROCEDURE — 82436 ASSAY OF URINE CHLORIDE: CPT

## 2018-10-24 PROCEDURE — 82570 ASSAY OF URINE CREATININE: CPT

## 2018-10-24 PROCEDURE — A9500 TC99M SESTAMIBI: HCPCS | Performed by: RADIOLOGY

## 2018-10-24 RX ORDER — HEPARIN SODIUM 10000 [USP'U]/ML
5000 INJECTION, SOLUTION INTRAVENOUS; SUBCUTANEOUS 3 TIMES DAILY
Status: DISCONTINUED | OUTPATIENT
Start: 2018-10-24 | End: 2018-10-24 | Stop reason: HOSPADM

## 2018-10-24 RX ORDER — VERAPAMIL HYDROCHLORIDE 240 MG/1
240 TABLET, FILM COATED, EXTENDED RELEASE ORAL NIGHTLY
Status: DISCONTINUED | OUTPATIENT
Start: 2018-10-24 | End: 2018-10-24

## 2018-10-24 RX ORDER — ACETAMINOPHEN 325 MG/1
650 TABLET ORAL ONCE
Status: COMPLETED | OUTPATIENT
Start: 2018-10-24 | End: 2018-10-24

## 2018-10-24 RX ORDER — VERAPAMIL HYDROCHLORIDE 240 MG/1
120 TABLET, FILM COATED, EXTENDED RELEASE ORAL NIGHTLY
Status: DISCONTINUED | OUTPATIENT
Start: 2018-10-24 | End: 2018-10-24 | Stop reason: HOSPADM

## 2018-10-24 RX ORDER — SODIUM CHLORIDE 0.9 % (FLUSH) 0.9 %
10 SYRINGE (ML) INJECTION EVERY 12 HOURS SCHEDULED
Status: DISCONTINUED | OUTPATIENT
Start: 2018-10-24 | End: 2018-10-24 | Stop reason: HOSPADM

## 2018-10-24 RX ORDER — HYDRALAZINE HYDROCHLORIDE 50 MG/1
100 TABLET, FILM COATED ORAL 3 TIMES DAILY
Status: DISCONTINUED | OUTPATIENT
Start: 2018-10-24 | End: 2018-10-24 | Stop reason: HOSPADM

## 2018-10-24 RX ORDER — HYDRALAZINE HYDROCHLORIDE 20 MG/ML
10 INJECTION INTRAMUSCULAR; INTRAVENOUS EVERY 6 HOURS PRN
Status: DISCONTINUED | OUTPATIENT
Start: 2018-10-24 | End: 2018-10-24 | Stop reason: HOSPADM

## 2018-10-24 RX ORDER — ONDANSETRON 2 MG/ML
4 INJECTION INTRAMUSCULAR; INTRAVENOUS EVERY 6 HOURS PRN
Status: DISCONTINUED | OUTPATIENT
Start: 2018-10-24 | End: 2018-10-24 | Stop reason: HOSPADM

## 2018-10-24 RX ORDER — HYDRALAZINE HYDROCHLORIDE 25 MG/1
100 TABLET, FILM COATED ORAL ONCE
Status: COMPLETED | OUTPATIENT
Start: 2018-10-24 | End: 2018-10-24

## 2018-10-24 RX ORDER — TRAZODONE HYDROCHLORIDE 50 MG/1
25 TABLET ORAL NIGHTLY PRN
Status: DISCONTINUED | OUTPATIENT
Start: 2018-10-24 | End: 2018-10-24 | Stop reason: HOSPADM

## 2018-10-24 RX ORDER — ACETAMINOPHEN 325 MG/1
650 TABLET ORAL EVERY 8 HOURS PRN
Status: DISCONTINUED | OUTPATIENT
Start: 2018-10-24 | End: 2018-10-24 | Stop reason: HOSPADM

## 2018-10-24 RX ORDER — SODIUM CHLORIDE 0.9 % (FLUSH) 0.9 %
5 SYRINGE (ML) INJECTION
Status: DISCONTINUED | OUTPATIENT
Start: 2018-10-24 | End: 2018-10-24 | Stop reason: HOSPADM

## 2018-10-24 RX ORDER — LATANOPROST 50 UG/ML
1 SOLUTION/ DROPS OPHTHALMIC NIGHTLY
Status: DISCONTINUED | OUTPATIENT
Start: 2018-10-24 | End: 2018-10-24 | Stop reason: HOSPADM

## 2018-10-24 RX ORDER — SODIUM CHLORIDE 0.9 % (FLUSH) 0.9 %
10 SYRINGE (ML) INJECTION PRN
Status: DISCONTINUED | OUTPATIENT
Start: 2018-10-24 | End: 2018-10-24 | Stop reason: HOSPADM

## 2018-10-24 RX ORDER — VERAPAMIL HYDROCHLORIDE 240 MG/1
240 TABLET, FILM COATED, EXTENDED RELEASE ORAL ONCE
Status: COMPLETED | OUTPATIENT
Start: 2018-10-24 | End: 2018-10-24

## 2018-10-24 RX ADMIN — HYDRALAZINE HYDROCHLORIDE 100 MG: 50 TABLET, FILM COATED ORAL at 08:54

## 2018-10-24 RX ADMIN — ACETAMINOPHEN 650 MG: 325 TABLET ORAL at 01:52

## 2018-10-24 RX ADMIN — VERAPAMIL HYDROCHLORIDE 240 MG: 240 TABLET, FILM COATED, EXTENDED RELEASE ORAL at 04:59

## 2018-10-24 RX ADMIN — ACETAMINOPHEN 650 MG: 325 TABLET ORAL at 06:27

## 2018-10-24 RX ADMIN — Medication 31 MILLICURIE: at 13:08

## 2018-10-24 RX ADMIN — Medication 10 ML: at 08:55

## 2018-10-24 RX ADMIN — TRAZODONE HYDROCHLORIDE 25 MG: 50 TABLET ORAL at 01:57

## 2018-10-24 RX ADMIN — Medication 10 MILLICURIE: at 11:08

## 2018-10-24 RX ADMIN — HYDRALAZINE HYDROCHLORIDE 100 MG: 50 TABLET, FILM COATED ORAL at 15:03

## 2018-10-24 RX ADMIN — HYDRALAZINE HYDROCHLORIDE 100 MG: 25 TABLET, FILM COATED ORAL at 01:53

## 2018-10-24 RX ADMIN — REGADENOSON 0.4 MG: 0.08 INJECTION, SOLUTION INTRAVENOUS at 13:07

## 2018-10-24 RX ADMIN — ASPIRIN 81 MG CHEWABLE TABLET 324 MG: 81 TABLET CHEWABLE at 01:51

## 2018-10-24 ASSESSMENT — PAIN DESCRIPTION - DESCRIPTORS: DESCRIPTORS: ACHING

## 2018-10-24 ASSESSMENT — PAIN DESCRIPTION - FREQUENCY: FREQUENCY: CONTINUOUS

## 2018-10-24 ASSESSMENT — PAIN SCALES - GENERAL
PAINLEVEL_OUTOF10: 7
PAINLEVEL_OUTOF10: 10
PAINLEVEL_OUTOF10: 10
PAINLEVEL_OUTOF10: 5

## 2018-10-24 ASSESSMENT — PAIN DESCRIPTION - LOCATION
LOCATION: LEG
LOCATION: GENERALIZED
LOCATION: GENERALIZED

## 2018-10-24 ASSESSMENT — PAIN DESCRIPTION - ORIENTATION
ORIENTATION: LEFT
ORIENTATION: RIGHT;LEFT

## 2018-10-24 ASSESSMENT — PAIN DESCRIPTION - PAIN TYPE
TYPE: ACUTE PAIN
TYPE: CHRONIC PAIN
TYPE: CHRONIC PAIN

## 2018-10-24 ASSESSMENT — PAIN DESCRIPTION - PROGRESSION: CLINICAL_PROGRESSION: GRADUALLY IMPROVING

## 2018-10-24 NOTE — DISCHARGE INSTR - COC
INTERVENTION:[id number]}  Weight Bearing Status/Restrictions: 50Natalie More CC Weight Bearin}  Other Medical Equipment (for information only, NOT a DME order):  {EQUIPMENT:965153684}  Other Treatments: ***    Patient's personal belongings (please select all that are sent with patient):  {CHP DME Belongings:985351156}    RN SIGNATURE:  {Esignature:[id number]}    CASE MANAGEMENT/SOCIAL WORK SECTION    Inpatient Status Date: ***    Readmission Risk Assessment Score:  Readmission Risk              Risk of Unplanned Readmission:        13           Discharging to Facility/ Agency   · Name:   · Address:  · Phone:  · Fax:    Dialysis Facility (if applicable)   · Name:  · Address:  · Dialysis Schedule:  · Phone:  · Fax:    / signature: {Esignature:[id number]}    PHYSICIAN SECTION    Prognosis: {Prognosis:6805345236}    Condition at Discharge: 50Natalie More Patient Condition:660164548}    Rehab Potential (if transferring to Rehab): {Prognosis:1192006392}    Recommended Labs or Other Treatments After Discharge: ***    Physician Certification: I certify the above information and transfer of Raquel Tinsley  is necessary for the continuing treatment of the diagnosis listed and that she requires {Admit to Appropriate Level of Care:18389} for {GREATER/LESS:591498288} 30 days.      Update Admission H&P: [facility]77[facility]}    PHYSICIAN SIGNATURE:  {Esignature:[id number]}

## 2018-10-24 NOTE — H&P
comminuted    PVD (peripheral vascular disease) (HCC)    History of tobacco abuse    CKD (chronic kidney disease) stage 3, GFR 30-59 ml/min (HCC)    Closed compression fracture of lumbar vertebra (HCC)    Closed compression fracture of lumbar vertebra (HCC)    Iron deficiency anemia due to chronic blood loss    Closed compression fracture of lumbar vertebra (HCC)    Type 2 diabetes mellitus without complication, without long-term current use of insulin (HCC)    Osteoarthritis    Chest pain       Atypical chest pain  -need to rule out cardiac cause  -Last HA1c 6.1 (07/16/2018)  -Heart score 4 (age, 3x risk factors) moderate, risk of MACE of 12-16%  -MARISOL score 3 (age, 3x risk factors, ASA use)  -Obtain lipid panel to calculate ASCVD  -Obtain NM stress test    HTN  -Resumed home dose hydralazine, verapamil  -Hold losartan for now in light of ? KALPESH    ? KALPESH on CKD stage 3A  -Cr 1.3 baseline 1.2-1.3  -Obtain urine study  -Obtain UA w micro    Normocytic anemia  -Hgb 8.5 on presentation  -baseline last Hgb9.9 (01/11/2018)  -Obtain FOBT  -Trend H&H R3TG    Systolic heart murmur  -suspect AS  -F/U Echo in stress test    GI/DVT prophylaxis: NPO/heparin    Valeria Olivier M.D., PGY-2  Internal medicine resident    Attending physician: Parul Ovalle

## 2018-10-26 LAB
EKG ATRIAL RATE: 69 BPM
EKG P AXIS: 60 DEGREES
EKG P-R INTERVAL: 168 MS
EKG Q-T INTERVAL: 422 MS
EKG QRS DURATION: 92 MS
EKG QTC CALCULATION (BAZETT): 452 MS
EKG R AXIS: 5 DEGREES
EKG T AXIS: 41 DEGREES
EKG VENTRICULAR RATE: 69 BPM

## 2018-10-29 ENCOUNTER — HOSPITAL ENCOUNTER (OUTPATIENT)
Age: 83
Discharge: HOME | End: 2018-10-29
Payer: COMMERCIAL

## 2018-10-29 DIAGNOSIS — D50.0 IRON DEFICIENCY ANEMIA DUE TO CHRONIC BLOOD LOSS: ICD-10-CM

## 2018-10-29 LAB
FERRITIN: 28 NG/ML
IRON SATURATION: 7 % (ref 15–50)
IRON: 20 MCG/DL (ref 37–145)
TOTAL IRON BINDING CAPACITY: 283 MCG/DL (ref 250–450)

## 2018-10-29 PROCEDURE — 83540 ASSAY OF IRON: CPT

## 2018-10-29 PROCEDURE — 36415 COLL VENOUS BLD VENIPUNCTURE: CPT

## 2018-10-29 PROCEDURE — 82728 ASSAY OF FERRITIN: CPT

## 2018-10-29 PROCEDURE — 83550 IRON BINDING TEST: CPT

## 2018-11-01 ENCOUNTER — OFFICE VISIT (OUTPATIENT)
Dept: INTERNAL MEDICINE | Age: 83
End: 2018-11-01
Payer: COMMERCIAL

## 2018-11-01 VITALS
SYSTOLIC BLOOD PRESSURE: 180 MMHG | BODY MASS INDEX: 21.74 KG/M2 | WEIGHT: 122.7 LBS | HEART RATE: 74 BPM | TEMPERATURE: 97.9 F | DIASTOLIC BLOOD PRESSURE: 76 MMHG | HEIGHT: 63 IN | RESPIRATION RATE: 16 BRPM

## 2018-11-01 DIAGNOSIS — Z23 NEED FOR PROPHYLACTIC VACCINATION AGAINST STREPTOCOCCUS PNEUMONIAE (PNEUMOCOCCUS): ICD-10-CM

## 2018-11-01 DIAGNOSIS — R07.89 ATYPICAL CHEST PAIN: Primary | ICD-10-CM

## 2018-11-01 DIAGNOSIS — I10 ESSENTIAL HYPERTENSION: ICD-10-CM

## 2018-11-01 DIAGNOSIS — M81.0 AGE-RELATED OSTEOPOROSIS WITHOUT CURRENT PATHOLOGICAL FRACTURE: Chronic | ICD-10-CM

## 2018-11-01 DIAGNOSIS — M25.552 PAIN OF LEFT HIP JOINT: ICD-10-CM

## 2018-11-01 DIAGNOSIS — D50.9 IRON DEFICIENCY ANEMIA, UNSPECIFIED IRON DEFICIENCY ANEMIA TYPE: ICD-10-CM

## 2018-11-01 PROCEDURE — 1101F PT FALLS ASSESS-DOCD LE1/YR: CPT | Performed by: INTERNAL MEDICINE

## 2018-11-01 PROCEDURE — 1090F PRES/ABSN URINE INCON ASSESS: CPT | Performed by: INTERNAL MEDICINE

## 2018-11-01 PROCEDURE — G8482 FLU IMMUNIZE ORDER/ADMIN: HCPCS | Performed by: INTERNAL MEDICINE

## 2018-11-01 PROCEDURE — G8399 PT W/DXA RESULTS DOCUMENT: HCPCS | Performed by: INTERNAL MEDICINE

## 2018-11-01 PROCEDURE — 1036F TOBACCO NON-USER: CPT | Performed by: INTERNAL MEDICINE

## 2018-11-01 PROCEDURE — G8420 CALC BMI NORM PARAMETERS: HCPCS | Performed by: INTERNAL MEDICINE

## 2018-11-01 PROCEDURE — 6360000002 HC RX W HCPCS

## 2018-11-01 PROCEDURE — 90670 PCV13 VACCINE IM: CPT

## 2018-11-01 PROCEDURE — 1111F DSCHRG MED/CURRENT MED MERGE: CPT | Performed by: INTERNAL MEDICINE

## 2018-11-01 PROCEDURE — G8427 DOCREV CUR MEDS BY ELIG CLIN: HCPCS | Performed by: INTERNAL MEDICINE

## 2018-11-01 PROCEDURE — G0009 ADMIN PNEUMOCOCCAL VACCINE: HCPCS

## 2018-11-01 PROCEDURE — 1123F ACP DISCUSS/DSCN MKR DOCD: CPT | Performed by: INTERNAL MEDICINE

## 2018-11-01 PROCEDURE — 4040F PNEUMOC VAC/ADMIN/RCVD: CPT | Performed by: INTERNAL MEDICINE

## 2018-11-01 PROCEDURE — 99213 OFFICE O/P EST LOW 20 MIN: CPT | Performed by: INTERNAL MEDICINE

## 2018-11-01 RX ORDER — COVID-19 ANTIGEN TEST
KIT MISCELLANEOUS 2 TIMES DAILY PRN
COMMUNITY
End: 2019-06-25 | Stop reason: SDUPTHER

## 2018-11-01 RX ORDER — AMOXICILLIN 250 MG
1 CAPSULE ORAL DAILY PRN
COMMUNITY
End: 2018-12-13 | Stop reason: SDUPTHER

## 2018-11-01 RX ORDER — LANOLIN ALCOHOL/MO/W.PET/CERES
325 CREAM (GRAM) TOPICAL 2 TIMES DAILY
Qty: 90 TABLET | Refills: 3 | Status: SHIPPED | OUTPATIENT
Start: 2018-11-01 | End: 2018-12-13 | Stop reason: SDUPTHER

## 2018-11-01 RX ORDER — VERAPAMIL HYDROCHLORIDE 240 MG/1
240 TABLET, FILM COATED, EXTENDED RELEASE ORAL NIGHTLY
Qty: 90 TABLET | Refills: 1 | Status: SHIPPED | OUTPATIENT
Start: 2018-11-01 | End: 2019-01-16 | Stop reason: SDUPTHER

## 2018-11-01 RX ORDER — LANOLIN ALCOHOL/MO/W.PET/CERES
3 CREAM (GRAM) TOPICAL DAILY
COMMUNITY
End: 2019-06-25 | Stop reason: ALTCHOICE

## 2018-11-01 RX ORDER — HYDRALAZINE HYDROCHLORIDE 100 MG/1
100 TABLET, FILM COATED ORAL 3 TIMES DAILY
Qty: 270 TABLET | Refills: 1 | Status: SHIPPED | OUTPATIENT
Start: 2018-11-01 | End: 2019-01-16 | Stop reason: SDUPTHER

## 2018-11-01 NOTE — PATIENT INSTRUCTIONS
you can lose your balance and fall. · Talk to your doctor if you have numbness in your feet. Preventing falls at home  · Remove raised doorway thresholds, throw rugs, and clutter. Repair loose carpet or raised areas in the floor. · Move furniture and electrical cords to keep them out of walking paths. · Use nonskid floor wax, and wipe up spills right away, especially on ceramic tile floors. · If you use a walker or cane, put rubber tips on it. If you use crutches, clean the bottoms of them regularly with an abrasive pad, such as steel wool. · Keep your house well lit, especially [facility], and outside walkways. Use night-lights in areas such as hallways and bathrooms. Add extra light switches or use remote switches (such as switches that go on or off when you clap your hands) to make it easier to turn lights on if you have to get up during the night. · Install sturdy handrails on stairways. · Move items in your cabinets so that the things you use a lot are on the lower shelves (about waist level). · Keep a cordless phone and a flashlight with new batteries by your bed. If possible, put a phone in each of the main rooms of your house, or carry a cell phone in case you fall and cannot reach a phone. Or, you can wear a device around your neck or wrist. You push a button that sends a signal for help. · Wear low-heeled shoes that fit well and give your feet good support. Use footwear with nonskid soles. Check the heels and soles of your shoes for wear. Repair or replace worn heels or soles. · Do not wear socks without shoes on wood floors. · Walk on the grass when the sidewalks are slippery. If you live in an area that gets snow and ice in the winter, sprinkle salt on slippery steps and sidewalks. Preventing falls in the bath  · Install grab bars and nonskid mats inside and outside your shower or tub and near the toilet and sinks. · Use shower chairs and bath benches.   · Use a hand-held shower head
room air

## 2018-11-01 NOTE — PROGRESS NOTES
negative and she was discharged on the same day. Current status: today, she is chest pain free. Has not had any pain since the discharge. Today she complains of low back pain and right hip pain. She had compression fractures on L2 in the past, multilevel severe degenerative disease with canal narrowing. Has refused surgery in the past.     Review of Systems:  A comprehensive review of systems was negative except for what was noted in the HPI. Positive for constipation, takes stool softeners which helps. Physical Exam:  General Appearance: alert and oriented to person, place and time  Skin: warm and dry, no rash or erythema  Head: normocephalic and atraumatic  Neck: neck supple and non tender without mass   Pulmonary/Chest: clear to auscultation bilaterally- no wheezes, rales or rhonchi, normal air movement, no respiratory distress  Cardiovascular: normal rate, normal S1 and S2 and no gallops  Abdomen: soft, non-tender, non-distended, normal bowel sounds, no masses or organomegaly  Extremities: no cyanosis and no clubbing  Musculoskeletal: normal range of motion, no joint swelling, deformity or tenderness  Neurologic: gait and coordination normal and speech normal    Initial post-discharge communication occurred between nurse care coordinator and patient on 10/24/18- see documentation in chart: telephone encounter. Assessment/Plan:  Clara Phillips was seen today for care management and follow-up from hospital.    Diagnoses and all orders for this visit:    Essential hypertension  - Losartan 50 daily, hydralazine 100 TID, verapamil 240 daily.      Atypical chest pain: Resolved     Iron deficiency anemia  - Denies any bleeding  - Iron studies suggestive of iron deficiency  - Start iron supplements  - FIT test     Osteopenia with previous compression fractures  - Refused surgery in the past  - FRAX score 10 year major osteoporotic 8.3, hip fracture 1.4    Left hip pain  - Likely from OA  - refer to physical therapy

## 2018-11-08 ENCOUNTER — TELEPHONE (OUTPATIENT)
Dept: INTERNAL MEDICINE | Age: 83
End: 2018-11-08

## 2018-11-08 DIAGNOSIS — R19.5 HEME POSITIVE STOOL: Primary | ICD-10-CM

## 2018-11-08 DIAGNOSIS — D50.9 IRON DEFICIENCY ANEMIA, UNSPECIFIED IRON DEFICIENCY ANEMIA TYPE: ICD-10-CM

## 2018-11-08 DIAGNOSIS — Z12.11 ENCOUNTER FOR SCREENING COLONOSCOPY: ICD-10-CM

## 2018-11-08 LAB
CONTROL: ABNORMAL
HEMOCCULT STL QL: POSITIVE

## 2018-11-08 NOTE — TELEPHONE ENCOUNTER
Spoke with pt and made appointment for re evaluation and made referral to surgical for evaluation with positive FIT. Pt verbalizes understanding.

## 2018-11-08 NOTE — TELEPHONE ENCOUNTER
Spoke to staff- patient seen on 10/1- noted to have signs of Iron Deficiency Anemia. At that appt, discussed importance of diagnostic cscope with possible EGD which patient refused at that appt. Referral placed given + FIT. Patient to be seen in office tomorrow.

## 2018-11-09 ENCOUNTER — OFFICE VISIT (OUTPATIENT)
Dept: INTERNAL MEDICINE | Age: 83
End: 2018-11-09
Payer: COMMERCIAL

## 2018-11-09 VITALS
RESPIRATION RATE: 16 BRPM | WEIGHT: 118.1 LBS | DIASTOLIC BLOOD PRESSURE: 70 MMHG | HEIGHT: 63 IN | BODY MASS INDEX: 20.93 KG/M2 | HEART RATE: 73 BPM | TEMPERATURE: 97.3 F | SYSTOLIC BLOOD PRESSURE: 162 MMHG

## 2018-11-09 DIAGNOSIS — I10 ESSENTIAL HYPERTENSION: Primary | Chronic | ICD-10-CM

## 2018-11-09 DIAGNOSIS — I35.8 AORTIC SYSTOLIC MURMUR ON EXAMINATION: ICD-10-CM

## 2018-11-09 PROCEDURE — 1036F TOBACCO NON-USER: CPT | Performed by: INTERNAL MEDICINE

## 2018-11-09 PROCEDURE — 99214 OFFICE O/P EST MOD 30 MIN: CPT | Performed by: INTERNAL MEDICINE

## 2018-11-09 PROCEDURE — G8427 DOCREV CUR MEDS BY ELIG CLIN: HCPCS | Performed by: INTERNAL MEDICINE

## 2018-11-09 PROCEDURE — G8482 FLU IMMUNIZE ORDER/ADMIN: HCPCS | Performed by: INTERNAL MEDICINE

## 2018-11-09 PROCEDURE — 1101F PT FALLS ASSESS-DOCD LE1/YR: CPT | Performed by: INTERNAL MEDICINE

## 2018-11-09 PROCEDURE — 99202 OFFICE O/P NEW SF 15 MIN: CPT | Performed by: INTERNAL MEDICINE

## 2018-11-09 PROCEDURE — G8399 PT W/DXA RESULTS DOCUMENT: HCPCS | Performed by: INTERNAL MEDICINE

## 2018-11-09 PROCEDURE — 1123F ACP DISCUSS/DSCN MKR DOCD: CPT | Performed by: INTERNAL MEDICINE

## 2018-11-09 PROCEDURE — G8420 CALC BMI NORM PARAMETERS: HCPCS | Performed by: INTERNAL MEDICINE

## 2018-11-09 PROCEDURE — 1090F PRES/ABSN URINE INCON ASSESS: CPT | Performed by: INTERNAL MEDICINE

## 2018-11-09 PROCEDURE — 4040F PNEUMOC VAC/ADMIN/RCVD: CPT | Performed by: INTERNAL MEDICINE

## 2018-11-09 RX ORDER — LOSARTAN POTASSIUM AND HYDROCHLOROTHIAZIDE 12.5; 1 MG/1; MG/1
1 TABLET ORAL DAILY
Qty: 90 TABLET | Refills: 1 | Status: SHIPPED | OUTPATIENT
Start: 2018-11-09 | End: 2018-11-09 | Stop reason: CLARIF

## 2018-11-09 RX ORDER — HYDROCHLOROTHIAZIDE 25 MG/1
25 TABLET ORAL DAILY
Qty: 30 TABLET | Refills: 3 | Status: CANCELLED | OUTPATIENT
Start: 2018-11-09

## 2018-11-09 RX ORDER — LOSARTAN POTASSIUM 100 MG/1
100 TABLET ORAL DAILY
Qty: 90 TABLET | Refills: 1 | Status: SHIPPED | OUTPATIENT
Start: 2018-11-09 | End: 2019-01-16 | Stop reason: SDUPTHER

## 2018-11-09 RX ORDER — FERROUS SULFATE 325(65) MG
TABLET ORAL
Refills: 3 | COMMUNITY
Start: 2018-11-01 | End: 2018-11-09 | Stop reason: SDUPTHER

## 2018-11-09 RX ORDER — LOSARTAN POTASSIUM 50 MG/1
50 TABLET ORAL DAILY
Qty: 30 TABLET | Refills: 3 | Status: CANCELLED | OUTPATIENT
Start: 2018-11-09

## 2018-11-09 ASSESSMENT — ENCOUNTER SYMPTOMS
CONSTIPATION: 1
SORE THROAT: 0
COUGH: 0
SHORTNESS OF BREATH: 0
NAUSEA: 0
ABDOMINAL PAIN: 0
BLOOD IN STOOL: 0
DIARRHEA: 0
VOMITING: 0
WHEEZING: 0
STRIDOR: 0

## 2018-11-30 ENCOUNTER — HOSPITAL ENCOUNTER (OUTPATIENT)
Dept: NON INVASIVE DIAGNOSTICS | Age: 83
Discharge: HOME | End: 2018-11-30
Payer: COMMERCIAL

## 2018-11-30 DIAGNOSIS — I35.8 AORTIC SYSTOLIC MURMUR ON EXAMINATION: ICD-10-CM

## 2018-11-30 LAB
LV EF: 65 %
LVEF MODALITY: NORMAL

## 2018-11-30 PROCEDURE — 93306 TTE W/DOPPLER COMPLETE: CPT

## 2018-12-06 ENCOUNTER — TELEPHONE (OUTPATIENT)
Dept: INTERNAL MEDICINE | Age: 83
End: 2018-12-06

## 2018-12-06 NOTE — TELEPHONE ENCOUNTER
Pt wants to know how her results from her chest xray are she is asking for a nurse to call her back to see how she is doing

## 2018-12-13 ENCOUNTER — OFFICE VISIT (OUTPATIENT)
Dept: INTERNAL MEDICINE | Age: 83
End: 2018-12-13
Payer: COMMERCIAL

## 2018-12-13 VITALS
WEIGHT: 117 LBS | HEIGHT: 63 IN | DIASTOLIC BLOOD PRESSURE: 50 MMHG | SYSTOLIC BLOOD PRESSURE: 122 MMHG | OXYGEN SATURATION: 97 % | HEART RATE: 64 BPM | TEMPERATURE: 97.3 F | BODY MASS INDEX: 20.73 KG/M2

## 2018-12-13 DIAGNOSIS — K59.04 CHRONIC IDIOPATHIC CONSTIPATION: ICD-10-CM

## 2018-12-13 DIAGNOSIS — D50.9 IRON DEFICIENCY ANEMIA, UNSPECIFIED IRON DEFICIENCY ANEMIA TYPE: ICD-10-CM

## 2018-12-13 DIAGNOSIS — I10 ESSENTIAL HYPERTENSION: Primary | Chronic | ICD-10-CM

## 2018-12-13 PROCEDURE — 1090F PRES/ABSN URINE INCON ASSESS: CPT | Performed by: INTERNAL MEDICINE

## 2018-12-13 PROCEDURE — G8399 PT W/DXA RESULTS DOCUMENT: HCPCS | Performed by: INTERNAL MEDICINE

## 2018-12-13 PROCEDURE — 1101F PT FALLS ASSESS-DOCD LE1/YR: CPT | Performed by: INTERNAL MEDICINE

## 2018-12-13 PROCEDURE — G8482 FLU IMMUNIZE ORDER/ADMIN: HCPCS | Performed by: INTERNAL MEDICINE

## 2018-12-13 PROCEDURE — G8427 DOCREV CUR MEDS BY ELIG CLIN: HCPCS | Performed by: INTERNAL MEDICINE

## 2018-12-13 PROCEDURE — G8420 CALC BMI NORM PARAMETERS: HCPCS | Performed by: INTERNAL MEDICINE

## 2018-12-13 PROCEDURE — 1123F ACP DISCUSS/DSCN MKR DOCD: CPT | Performed by: INTERNAL MEDICINE

## 2018-12-13 PROCEDURE — 1036F TOBACCO NON-USER: CPT | Performed by: INTERNAL MEDICINE

## 2018-12-13 PROCEDURE — 4040F PNEUMOC VAC/ADMIN/RCVD: CPT | Performed by: INTERNAL MEDICINE

## 2018-12-13 PROCEDURE — 99213 OFFICE O/P EST LOW 20 MIN: CPT | Performed by: INTERNAL MEDICINE

## 2018-12-13 RX ORDER — AMOXICILLIN 250 MG
1 CAPSULE ORAL DAILY PRN
Qty: 30 TABLET | Refills: 3 | Status: SHIPPED | OUTPATIENT
Start: 2018-12-13 | End: 2019-06-25 | Stop reason: ALTCHOICE

## 2018-12-13 RX ORDER — LANOLIN ALCOHOL/MO/W.PET/CERES
325 CREAM (GRAM) TOPICAL
Qty: 90 TABLET | Refills: 1 | Status: SHIPPED | OUTPATIENT
Start: 2018-12-13 | End: 2019-01-16 | Stop reason: SDUPTHER

## 2018-12-13 ASSESSMENT — ENCOUNTER SYMPTOMS
NAUSEA: 0
SORE THROAT: 0
VOMITING: 0
BLOOD IN STOOL: 0
STRIDOR: 0
CONSTIPATION: 1
DIARRHEA: 0
WHEEZING: 0
COUGH: 0
SHORTNESS OF BREATH: 0
ABDOMINAL PAIN: 0

## 2018-12-13 NOTE — PROGRESS NOTES
Attending Physician Statement  I have discussed the case, including pertinent history and exam findings with the resident. I agree with the assessment, plan and orders as documented by the resident. Pt presented for the follow up for hypertension, BP was 185 and losartan increased, BP is controlled low DP, needs to continue monitor BP. Also has constipation, recommended colace.   Tina Rhoades
and time. She appears well-developed and well-nourished. HENT:   Nose: Nose normal.   Mouth/Throat: Oropharynx is clear and moist.   Eyes: Conjunctivae are normal. Right eye exhibits no discharge. Left eye exhibits no discharge. No scleral icterus. Neck: Normal range of motion. Neck supple. No JVD present. No thyromegaly present. Cardiovascular: Normal rate, regular rhythm and intact distal pulses. Murmur heard. 3/6 systolic murmur in aortic area, radiating to right carotid and the LLBS    Pulmonary/Chest: Effort normal and breath sounds normal. No stridor. No respiratory distress. She has no wheezes. She has no rales. She exhibits no tenderness. Abdominal: Soft. Bowel sounds are normal. She exhibits no distension. There is no tenderness. There is no rebound and no guarding. Musculoskeletal: Normal range of motion. She exhibits no edema, tenderness or deformity. Lymphadenopathy:     She has no cervical adenopathy. Neurological: She is alert and oriented to person, place, and time. She displays normal reflexes. No sensory deficit. Coordination normal.   Skin: Skin is warm and dry. Psychiatric: She has a normal mood and affect. Her behavior is normal. Judgment and thought content normal.     ASSESSMENT/PLAN:  Changes in medications ontoday's visit:   Tanner Contreras was seen today for hypertension and constipation. Diagnoses and all orders for this visit:    Essential hypertension    Iron deficiency anemia, unspecified iron deficiency anemia type  -     ferrous sulfate (FE TABS) 325 (65 Fe) MG EC tablet; Take 1 tablet by mouth daily (with breakfast)    Chronic idiopathic constipation  -     senna-docusate (PERICOLACE) 8.6-50 MG per tablet;  Take 1 tablet by mouth daily as needed for Constipation      RTC:   3 month     I have reviewed myfindings and recommendations with Luly Molina and Dr Nayely Watts MD, PGY-3  Internal Medicine Resident     12/13/2018 2:23 PM

## 2018-12-30 NOTE — TELEPHONE ENCOUNTER
Called the patient to explain that patient's FIT test was positive for blood. She also has iron deficiency anemia and constipation and suggested colonoscopy. Patient insisted that she doesn't have any blood with her stools. Advised her to call and schedule appt to discuss the results further, as it was difficult to verify full understanding over the phone. She said she will call the clinic after the holidays to schedule appt.      Electronically signed by Richard Nieves MD on 12/30/2018 at 6:09 AM

## 2019-01-08 ENCOUNTER — TELEPHONE (OUTPATIENT)
Dept: SURGERY | Age: 84
End: 2019-01-08

## 2019-01-16 ENCOUNTER — TELEPHONE (OUTPATIENT)
Dept: SURGERY | Age: 84
End: 2019-01-16

## 2019-01-16 ENCOUNTER — TELEPHONE (OUTPATIENT)
Dept: INTERNAL MEDICINE | Age: 84
End: 2019-01-16

## 2019-01-16 ENCOUNTER — OFFICE VISIT (OUTPATIENT)
Dept: INTERNAL MEDICINE | Age: 84
End: 2019-01-16
Payer: COMMERCIAL

## 2019-01-16 VITALS
TEMPERATURE: 97.9 F | BODY MASS INDEX: 20.61 KG/M2 | HEART RATE: 73 BPM | SYSTOLIC BLOOD PRESSURE: 113 MMHG | HEIGHT: 63 IN | RESPIRATION RATE: 18 BRPM | DIASTOLIC BLOOD PRESSURE: 59 MMHG | WEIGHT: 116.3 LBS

## 2019-01-16 DIAGNOSIS — K59.04 CHRONIC IDIOPATHIC CONSTIPATION: ICD-10-CM

## 2019-01-16 DIAGNOSIS — D50.9 IRON DEFICIENCY ANEMIA, UNSPECIFIED IRON DEFICIENCY ANEMIA TYPE: ICD-10-CM

## 2019-01-16 DIAGNOSIS — E11.9 TYPE 2 DIABETES MELLITUS WITHOUT COMPLICATION, WITHOUT LONG-TERM CURRENT USE OF INSULIN (HCC): ICD-10-CM

## 2019-01-16 DIAGNOSIS — I10 ESSENTIAL HYPERTENSION: Chronic | ICD-10-CM

## 2019-01-16 PROCEDURE — 1101F PT FALLS ASSESS-DOCD LE1/YR: CPT | Performed by: INTERNAL MEDICINE

## 2019-01-16 PROCEDURE — 1123F ACP DISCUSS/DSCN MKR DOCD: CPT | Performed by: INTERNAL MEDICINE

## 2019-01-16 PROCEDURE — G8420 CALC BMI NORM PARAMETERS: HCPCS | Performed by: INTERNAL MEDICINE

## 2019-01-16 PROCEDURE — 4040F PNEUMOC VAC/ADMIN/RCVD: CPT | Performed by: INTERNAL MEDICINE

## 2019-01-16 PROCEDURE — 1090F PRES/ABSN URINE INCON ASSESS: CPT | Performed by: INTERNAL MEDICINE

## 2019-01-16 PROCEDURE — G8482 FLU IMMUNIZE ORDER/ADMIN: HCPCS | Performed by: INTERNAL MEDICINE

## 2019-01-16 PROCEDURE — 99213 OFFICE O/P EST LOW 20 MIN: CPT | Performed by: INTERNAL MEDICINE

## 2019-01-16 PROCEDURE — G8427 DOCREV CUR MEDS BY ELIG CLIN: HCPCS | Performed by: INTERNAL MEDICINE

## 2019-01-16 PROCEDURE — G8399 PT W/DXA RESULTS DOCUMENT: HCPCS | Performed by: INTERNAL MEDICINE

## 2019-01-16 PROCEDURE — 1036F TOBACCO NON-USER: CPT | Performed by: INTERNAL MEDICINE

## 2019-01-16 RX ORDER — LOSARTAN POTASSIUM 100 MG/1
100 TABLET ORAL DAILY
Qty: 90 TABLET | Refills: 1 | Status: SHIPPED | OUTPATIENT
Start: 2019-01-16 | End: 2019-06-05 | Stop reason: SDUPTHER

## 2019-01-16 RX ORDER — ASPIRIN 81 MG/1
TABLET ORAL
Qty: 90 TABLET | Refills: 1 | Status: ON HOLD | OUTPATIENT
Start: 2019-01-16 | End: 2019-07-07 | Stop reason: HOSPADM

## 2019-01-16 RX ORDER — VERAPAMIL HYDROCHLORIDE 240 MG/1
240 TABLET, FILM COATED, EXTENDED RELEASE ORAL NIGHTLY
Qty: 90 TABLET | Refills: 1 | Status: SHIPPED | OUTPATIENT
Start: 2019-01-16 | End: 2019-06-05 | Stop reason: SDUPTHER

## 2019-01-16 RX ORDER — AMOXICILLIN 250 MG
1 CAPSULE ORAL DAILY PRN
Qty: 30 TABLET | Status: CANCELLED | OUTPATIENT
Start: 2019-01-16

## 2019-01-16 RX ORDER — LANOLIN ALCOHOL/MO/W.PET/CERES
325 CREAM (GRAM) TOPICAL
Qty: 90 TABLET | Refills: 1 | Status: ON HOLD | OUTPATIENT
Start: 2019-01-16 | End: 2019-07-10 | Stop reason: HOSPADM

## 2019-01-16 RX ORDER — HYDRALAZINE HYDROCHLORIDE 100 MG/1
100 TABLET, FILM COATED ORAL 3 TIMES DAILY
Qty: 270 TABLET | Refills: 1 | Status: SHIPPED | OUTPATIENT
Start: 2019-01-16 | End: 2019-06-04 | Stop reason: SDUPTHER

## 2019-01-28 ENCOUNTER — TELEPHONE (OUTPATIENT)
Dept: SURGERY | Age: 84
End: 2019-01-28

## 2019-02-03 ENCOUNTER — TELEPHONE (OUTPATIENT)
Dept: INTERNAL MEDICINE | Age: 84
End: 2019-02-03

## 2019-02-07 ENCOUNTER — TELEPHONE (OUTPATIENT)
Dept: INTERNAL MEDICINE | Age: 84
End: 2019-02-07

## 2019-02-11 ENCOUNTER — TELEPHONE (OUTPATIENT)
Dept: INTERNAL MEDICINE | Age: 84
End: 2019-02-11

## 2019-03-18 ENCOUNTER — TELEPHONE (OUTPATIENT)
Dept: INTERNAL MEDICINE | Age: 84
End: 2019-03-18

## 2019-03-21 ENCOUNTER — TELEPHONE (OUTPATIENT)
Dept: INTERNAL MEDICINE | Age: 84
End: 2019-03-21

## 2019-06-04 DIAGNOSIS — I10 ESSENTIAL HYPERTENSION: Chronic | ICD-10-CM

## 2019-06-04 NOTE — TELEPHONE ENCOUNTER
Patient called in at 11:34. She needs her Verapamil, Losartan and Hydralazine. She is completely out. \"I don't know if I should call 911 since I am not getting help from the clinic\". I told her not to call 911 for medication and that I would send the message to the clinical staff.

## 2019-06-05 RX ORDER — LOSARTAN POTASSIUM 100 MG/1
100 TABLET ORAL DAILY
Qty: 90 TABLET | Refills: 1 | Status: ON HOLD | OUTPATIENT
Start: 2019-06-05 | End: 2019-07-10 | Stop reason: HOSPADM

## 2019-06-05 RX ORDER — HYDRALAZINE HYDROCHLORIDE 100 MG/1
100 TABLET, FILM COATED ORAL 3 TIMES DAILY
Qty: 270 TABLET | Refills: 1 | Status: ON HOLD | OUTPATIENT
Start: 2019-06-05 | End: 2019-07-10 | Stop reason: HOSPADM

## 2019-06-05 RX ORDER — VERAPAMIL HYDROCHLORIDE 240 MG/1
240 TABLET, FILM COATED, EXTENDED RELEASE ORAL NIGHTLY
Qty: 90 TABLET | Refills: 1 | Status: ON HOLD | OUTPATIENT
Start: 2019-06-05 | End: 2019-09-06 | Stop reason: HOSPADM

## 2019-06-21 ENCOUNTER — APPOINTMENT (OUTPATIENT)
Dept: CT IMAGING | Age: 84
End: 2019-06-21
Payer: COMMERCIAL

## 2019-06-21 ENCOUNTER — HOSPITAL ENCOUNTER (EMERGENCY)
Age: 84
Discharge: HOME | End: 2019-06-21
Attending: EMERGENCY MEDICINE
Payer: COMMERCIAL

## 2019-06-21 VITALS
DIASTOLIC BLOOD PRESSURE: 91 MMHG | TEMPERATURE: 97.9 F | OXYGEN SATURATION: 95 % | HEART RATE: 80 BPM | BODY MASS INDEX: 21.09 KG/M2 | SYSTOLIC BLOOD PRESSURE: 176 MMHG | WEIGHT: 119 LBS | HEIGHT: 63 IN | RESPIRATION RATE: 16 BRPM

## 2019-06-21 DIAGNOSIS — R11.2 INTRACTABLE VOMITING WITH NAUSEA, UNSPECIFIED VOMITING TYPE: Primary | ICD-10-CM

## 2019-06-21 DIAGNOSIS — S32.020A CLOSED COMPRESSION FRACTURE OF SECOND LUMBAR VERTEBRA, INITIAL ENCOUNTER: ICD-10-CM

## 2019-06-21 LAB
ALBUMIN SERPL-MCNC: 4 G/DL (ref 3.5–5.2)
ALP BLD-CCNC: 69 U/L (ref 35–104)
ALT SERPL-CCNC: 9 U/L (ref 0–32)
ANION GAP SERPL CALCULATED.3IONS-SCNC: 11 MMOL/L (ref 7–16)
AST SERPL-CCNC: 19 U/L (ref 0–31)
BACTERIA: ABNORMAL /HPF
BASOPHILS ABSOLUTE: 0.07 E9/L (ref 0–0.2)
BASOPHILS RELATIVE PERCENT: 0.9 % (ref 0–2)
BILIRUB SERPL-MCNC: 0.2 MG/DL (ref 0–1.2)
BILIRUBIN URINE: NEGATIVE
BLOOD, URINE: ABNORMAL
BUN BLDV-MCNC: 22 MG/DL (ref 8–23)
CALCIUM SERPL-MCNC: 9.7 MG/DL (ref 8.6–10.2)
CHLORIDE BLD-SCNC: 102 MMOL/L (ref 98–107)
CLARITY: CLEAR
CO2: 26 MMOL/L (ref 22–29)
COLOR: YELLOW
CREAT SERPL-MCNC: 1.4 MG/DL (ref 0.5–1)
EKG ATRIAL RATE: 81 BPM
EKG P AXIS: 69 DEGREES
EKG P-R INTERVAL: 154 MS
EKG Q-T INTERVAL: 422 MS
EKG QRS DURATION: 84 MS
EKG QTC CALCULATION (BAZETT): 490 MS
EKG R AXIS: 56 DEGREES
EKG T AXIS: 73 DEGREES
EKG VENTRICULAR RATE: 81 BPM
EOSINOPHILS ABSOLUTE: 0.02 E9/L (ref 0.05–0.5)
EOSINOPHILS RELATIVE PERCENT: 0.3 % (ref 0–6)
EPITHELIAL CELLS, UA: ABNORMAL /HPF
GFR AFRICAN AMERICAN: 43
GFR NON-AFRICAN AMERICAN: 43 ML/MIN/1.73
GLUCOSE BLD-MCNC: 109 MG/DL (ref 74–99)
GLUCOSE URINE: NEGATIVE MG/DL
HCT VFR BLD CALC: 32.6 % (ref 34–48)
HEMOGLOBIN: 9.8 G/DL (ref 11.5–15.5)
IMMATURE GRANULOCYTES #: 0.02 E9/L
IMMATURE GRANULOCYTES %: 0.3 % (ref 0–5)
KETONES, URINE: NEGATIVE MG/DL
LACTIC ACID: 0.9 MMOL/L (ref 0.5–2.2)
LEUKOCYTE ESTERASE, URINE: ABNORMAL
LIPASE: 15 U/L (ref 13–60)
LYMPHOCYTES ABSOLUTE: 1.87 E9/L (ref 1.5–4)
LYMPHOCYTES RELATIVE PERCENT: 23.8 % (ref 20–42)
MCH RBC QN AUTO: 26.9 PG (ref 26–35)
MCHC RBC AUTO-ENTMCNC: 30.1 % (ref 32–34.5)
MCV RBC AUTO: 89.6 FL (ref 80–99.9)
MONOCYTES ABSOLUTE: 0.91 E9/L (ref 0.1–0.95)
MONOCYTES RELATIVE PERCENT: 11.6 % (ref 2–12)
NEUTROPHILS ABSOLUTE: 4.98 E9/L (ref 1.8–7.3)
NEUTROPHILS RELATIVE PERCENT: 63.1 % (ref 43–80)
NITRITE, URINE: NEGATIVE
PDW BLD-RTO: 16.1 FL (ref 11.5–15)
PH UA: 6 (ref 5–9)
PLATELET # BLD: 190 E9/L (ref 130–450)
PMV BLD AUTO: 10.9 FL (ref 7–12)
POTASSIUM SERPL-SCNC: 3.7 MMOL/L (ref 3.5–5)
PROTEIN UA: 30 MG/DL
RBC # BLD: 3.64 E12/L (ref 3.5–5.5)
RBC UA: ABNORMAL /HPF (ref 0–2)
REASON FOR REJECTION: NORMAL
REJECTED TEST: NORMAL
SODIUM BLD-SCNC: 139 MMOL/L (ref 132–146)
SPECIFIC GRAVITY UA: <=1.005 (ref 1–1.03)
TOTAL PROTEIN: 7.4 G/DL (ref 6.4–8.3)
TROPONIN: 0.04 NG/ML (ref 0–0.03)
UROBILINOGEN, URINE: 0.2 E.U./DL
WBC # BLD: 7.9 E9/L (ref 4.5–11.5)
WBC UA: ABNORMAL /HPF (ref 0–5)
YEAST: ABNORMAL

## 2019-06-21 PROCEDURE — 93010 ELECTROCARDIOGRAM REPORT: CPT | Performed by: INTERNAL MEDICINE

## 2019-06-21 PROCEDURE — 84484 ASSAY OF TROPONIN QUANT: CPT

## 2019-06-21 PROCEDURE — 6360000002 HC RX W HCPCS: Performed by: NURSE PRACTITIONER

## 2019-06-21 PROCEDURE — 83690 ASSAY OF LIPASE: CPT

## 2019-06-21 PROCEDURE — 6360000004 HC RX CONTRAST MEDICATION: Performed by: RADIOLOGY

## 2019-06-21 PROCEDURE — 2580000003 HC RX 258: Performed by: NURSE PRACTITIONER

## 2019-06-21 PROCEDURE — 74177 CT ABD & PELVIS W/CONTRAST: CPT

## 2019-06-21 PROCEDURE — 80053 COMPREHEN METABOLIC PANEL: CPT

## 2019-06-21 PROCEDURE — 85025 COMPLETE CBC W/AUTO DIFF WBC: CPT

## 2019-06-21 PROCEDURE — 83605 ASSAY OF LACTIC ACID: CPT

## 2019-06-21 PROCEDURE — 93005 ELECTROCARDIOGRAM TRACING: CPT | Performed by: PHYSICIAN ASSISTANT

## 2019-06-21 PROCEDURE — 96374 THER/PROPH/DIAG INJ IV PUSH: CPT

## 2019-06-21 PROCEDURE — 81001 URINALYSIS AUTO W/SCOPE: CPT

## 2019-06-21 PROCEDURE — 36415 COLL VENOUS BLD VENIPUNCTURE: CPT

## 2019-06-21 PROCEDURE — 99284 EMERGENCY DEPT VISIT MOD MDM: CPT

## 2019-06-21 RX ORDER — ONDANSETRON 2 MG/ML
4 INJECTION INTRAMUSCULAR; INTRAVENOUS ONCE
Status: COMPLETED | OUTPATIENT
Start: 2019-06-21 | End: 2019-06-21

## 2019-06-21 RX ORDER — 0.9 % SODIUM CHLORIDE 0.9 %
500 INTRAVENOUS SOLUTION INTRAVENOUS ONCE
Status: COMPLETED | OUTPATIENT
Start: 2019-06-21 | End: 2019-06-21

## 2019-06-21 RX ORDER — SODIUM CHLORIDE 0.9 % (FLUSH) 0.9 %
10 SYRINGE (ML) INJECTION PRN
Status: DISCONTINUED | OUTPATIENT
Start: 2019-06-21 | End: 2019-06-21 | Stop reason: HOSPADM

## 2019-06-21 RX ADMIN — ONDANSETRON HYDROCHLORIDE 4 MG: 2 SOLUTION INTRAMUSCULAR; INTRAVENOUS at 17:35

## 2019-06-21 RX ADMIN — SODIUM CHLORIDE 500 ML: 9 INJECTION, SOLUTION INTRAVENOUS at 17:32

## 2019-06-21 RX ADMIN — IOPAMIDOL 110 ML: 755 INJECTION, SOLUTION INTRAVENOUS at 19:47

## 2019-06-22 NOTE — ED NOTES
This RN was talking with patient about P.O. Fluids and she started yelling at this RN accusing this RN of taking her $20.  Informed her that she went to [place] with her purse. [place] police called to bedside. Insisting she had twenty dollar bill in her purse. Pt then picked up her phone and called 911 to report incident to police. [place] police handling matter.        Fredi Graves RN  06/21/19 2051

## 2019-06-22 NOTE — ED NOTES
Pt refuses exit VS at this time and continues to yell at staff. Pt alert and oriented x4. Speech clear. Respirations easy/unlabored. Skin warm/dry. Appropriate color. No signs of acute distress noted. Pt/family teaching provided. Pt stable for discharge.      Sofy Cuevas RN  06/21/19 5782
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
## 2019-06-25 ENCOUNTER — OFFICE VISIT (OUTPATIENT)
Dept: INTERNAL MEDICINE | Age: 84
End: 2019-06-25
Payer: COMMERCIAL

## 2019-06-25 ENCOUNTER — HOSPITAL ENCOUNTER (OUTPATIENT)
Age: 84
Setting detail: OBSERVATION
Discharge: LEFT BEFORE DISCHARGE | End: 2019-06-26
Attending: EMERGENCY MEDICINE | Admitting: INTERNAL MEDICINE
Payer: COMMERCIAL

## 2019-06-25 ENCOUNTER — APPOINTMENT (OUTPATIENT)
Dept: CT IMAGING | Age: 84
End: 2019-06-25
Payer: COMMERCIAL

## 2019-06-25 VITALS
HEIGHT: 63 IN | HEART RATE: 81 BPM | TEMPERATURE: 98.2 F | SYSTOLIC BLOOD PRESSURE: 172 MMHG | BODY MASS INDEX: 21.09 KG/M2 | RESPIRATION RATE: 18 BRPM | WEIGHT: 119 LBS | DIASTOLIC BLOOD PRESSURE: 84 MMHG

## 2019-06-25 DIAGNOSIS — K59.04 CHRONIC IDIOPATHIC CONSTIPATION: ICD-10-CM

## 2019-06-25 DIAGNOSIS — K59.00 CONSTIPATION, UNSPECIFIED CONSTIPATION TYPE: ICD-10-CM

## 2019-06-25 DIAGNOSIS — I10 ESSENTIAL HYPERTENSION: ICD-10-CM

## 2019-06-25 DIAGNOSIS — K29.00 ACUTE GASTRITIS WITHOUT HEMORRHAGE, UNSPECIFIED GASTRITIS TYPE: Primary | ICD-10-CM

## 2019-06-25 DIAGNOSIS — S32.029A CLOSED FRACTURE OF SECOND LUMBAR VERTEBRA, UNSPECIFIED FRACTURE MORPHOLOGY, INITIAL ENCOUNTER (HCC): ICD-10-CM

## 2019-06-25 DIAGNOSIS — R10.84 GENERALIZED ABDOMINAL PAIN: Primary | ICD-10-CM

## 2019-06-25 PROBLEM — R10.9 ABDOMINAL PAIN: Status: ACTIVE | Noted: 2019-06-25

## 2019-06-25 LAB
ALBUMIN SERPL-MCNC: 4 G/DL (ref 3.5–5.2)
ALP BLD-CCNC: 81 U/L (ref 35–104)
ALT SERPL-CCNC: 16 U/L (ref 0–32)
ANION GAP SERPL CALCULATED.3IONS-SCNC: 14 MMOL/L (ref 7–16)
AST SERPL-CCNC: 28 U/L (ref 0–31)
BILIRUB SERPL-MCNC: 0.2 MG/DL (ref 0–1.2)
BUN BLDV-MCNC: 18 MG/DL (ref 8–23)
CALCIUM SERPL-MCNC: 10.1 MG/DL (ref 8.6–10.2)
CHLORIDE BLD-SCNC: 94 MMOL/L (ref 98–107)
CK MB: 3.5 NG/ML (ref 0–4.3)
CO2: 27 MMOL/L (ref 22–29)
CREAT SERPL-MCNC: 1.5 MG/DL (ref 0.5–1)
FERRITIN: 47 NG/ML
GFR AFRICAN AMERICAN: 40
GFR NON-AFRICAN AMERICAN: 40 ML/MIN/1.73
GLUCOSE BLD-MCNC: 145 MG/DL (ref 74–99)
HCT VFR BLD CALC: 35 % (ref 34–48)
HEMOGLOBIN: 10.7 G/DL (ref 11.5–15.5)
IRON SATURATION: 10 % (ref 15–50)
IRON: 26 MCG/DL (ref 37–145)
LACTIC ACID, SEPSIS: 2.2 MMOL/L (ref 0.5–1.9)
LACTIC ACID: 1.6 MMOL/L (ref 0.5–2.2)
LIPASE: 14 U/L (ref 13–60)
MCH RBC QN AUTO: 27 PG (ref 26–35)
MCHC RBC AUTO-ENTMCNC: 30.6 % (ref 32–34.5)
MCV RBC AUTO: 88.4 FL (ref 80–99.9)
PDW BLD-RTO: 15.4 FL (ref 11.5–15)
PLATELET # BLD: 259 E9/L (ref 130–450)
PMV BLD AUTO: 11 FL (ref 7–12)
POTASSIUM SERPL-SCNC: 4 MMOL/L (ref 3.5–5)
RBC # BLD: 3.96 E12/L (ref 3.5–5.5)
SODIUM BLD-SCNC: 135 MMOL/L (ref 132–146)
TOTAL CK: 218 U/L (ref 20–180)
TOTAL IRON BINDING CAPACITY: 272 MCG/DL (ref 250–450)
TOTAL PROTEIN: 8.5 G/DL (ref 6.4–8.3)
TROPONIN: 0.02 NG/ML (ref 0–0.03)
TROPONIN: 0.03 NG/ML (ref 0–0.03)
WBC # BLD: 6.8 E9/L (ref 4.5–11.5)

## 2019-06-25 PROCEDURE — 4040F PNEUMOC VAC/ADMIN/RCVD: CPT | Performed by: INTERNAL MEDICINE

## 2019-06-25 PROCEDURE — 93005 ELECTROCARDIOGRAM TRACING: CPT | Performed by: NURSE PRACTITIONER

## 2019-06-25 PROCEDURE — 2580000003 HC RX 258: Performed by: EMERGENCY MEDICINE

## 2019-06-25 PROCEDURE — 85027 COMPLETE CBC AUTOMATED: CPT

## 2019-06-25 PROCEDURE — G8399 PT W/DXA RESULTS DOCUMENT: HCPCS | Performed by: INTERNAL MEDICINE

## 2019-06-25 PROCEDURE — 96376 TX/PRO/DX INJ SAME DRUG ADON: CPT

## 2019-06-25 PROCEDURE — 83690 ASSAY OF LIPASE: CPT

## 2019-06-25 PROCEDURE — 80053 COMPREHEN METABOLIC PANEL: CPT

## 2019-06-25 PROCEDURE — 84484 ASSAY OF TROPONIN QUANT: CPT

## 2019-06-25 PROCEDURE — 6370000000 HC RX 637 (ALT 250 FOR IP): Performed by: NURSE PRACTITIONER

## 2019-06-25 PROCEDURE — 96372 THER/PROPH/DIAG INJ SC/IM: CPT

## 2019-06-25 PROCEDURE — 2580000003 HC RX 258: Performed by: INTERNAL MEDICINE

## 2019-06-25 PROCEDURE — 96374 THER/PROPH/DIAG INJ IV PUSH: CPT

## 2019-06-25 PROCEDURE — C9113 INJ PANTOPRAZOLE SODIUM, VIA: HCPCS | Performed by: INTERNAL MEDICINE

## 2019-06-25 PROCEDURE — 99285 EMERGENCY DEPT VISIT HI MDM: CPT

## 2019-06-25 PROCEDURE — 83605 ASSAY OF LACTIC ACID: CPT

## 2019-06-25 PROCEDURE — 6360000002 HC RX W HCPCS: Performed by: EMERGENCY MEDICINE

## 2019-06-25 PROCEDURE — 99213 OFFICE O/P EST LOW 20 MIN: CPT | Performed by: INTERNAL MEDICINE

## 2019-06-25 PROCEDURE — 96375 TX/PRO/DX INJ NEW DRUG ADDON: CPT

## 2019-06-25 PROCEDURE — G8420 CALC BMI NORM PARAMETERS: HCPCS | Performed by: INTERNAL MEDICINE

## 2019-06-25 PROCEDURE — G0378 HOSPITAL OBSERVATION PER HR: HCPCS

## 2019-06-25 PROCEDURE — 1123F ACP DISCUSS/DSCN MKR DOCD: CPT | Performed by: INTERNAL MEDICINE

## 2019-06-25 PROCEDURE — 83540 ASSAY OF IRON: CPT

## 2019-06-25 PROCEDURE — 82728 ASSAY OF FERRITIN: CPT

## 2019-06-25 PROCEDURE — 83550 IRON BINDING TEST: CPT

## 2019-06-25 PROCEDURE — 1036F TOBACCO NON-USER: CPT | Performed by: INTERNAL MEDICINE

## 2019-06-25 PROCEDURE — G8427 DOCREV CUR MEDS BY ELIG CLIN: HCPCS | Performed by: INTERNAL MEDICINE

## 2019-06-25 PROCEDURE — 82553 CREATINE MB FRACTION: CPT

## 2019-06-25 PROCEDURE — 6370000000 HC RX 637 (ALT 250 FOR IP): Performed by: INTERNAL MEDICINE

## 2019-06-25 PROCEDURE — 36415 COLL VENOUS BLD VENIPUNCTURE: CPT

## 2019-06-25 PROCEDURE — 99212 OFFICE O/P EST SF 10 MIN: CPT | Performed by: INTERNAL MEDICINE

## 2019-06-25 PROCEDURE — 74176 CT ABD & PELVIS W/O CONTRAST: CPT

## 2019-06-25 PROCEDURE — 82550 ASSAY OF CK (CPK): CPT

## 2019-06-25 PROCEDURE — 6360000002 HC RX W HCPCS: Performed by: INTERNAL MEDICINE

## 2019-06-25 PROCEDURE — 1090F PRES/ABSN URINE INCON ASSESS: CPT | Performed by: INTERNAL MEDICINE

## 2019-06-25 RX ORDER — SODIUM CHLORIDE 9 MG/ML
INJECTION, SOLUTION INTRAVENOUS CONTINUOUS
Status: DISCONTINUED | OUTPATIENT
Start: 2019-06-25 | End: 2019-06-26

## 2019-06-25 RX ORDER — HYDRALAZINE HYDROCHLORIDE 50 MG/1
100 TABLET, FILM COATED ORAL 3 TIMES DAILY
Status: DISCONTINUED | OUTPATIENT
Start: 2019-06-25 | End: 2019-06-25

## 2019-06-25 RX ORDER — SUCRALFATE 1 G/1
1 TABLET ORAL EVERY 6 HOURS SCHEDULED
Status: DISCONTINUED | OUTPATIENT
Start: 2019-06-25 | End: 2019-06-25

## 2019-06-25 RX ORDER — 0.9 % SODIUM CHLORIDE 0.9 %
1000 INTRAVENOUS SOLUTION INTRAVENOUS ONCE
Status: COMPLETED | OUTPATIENT
Start: 2019-06-25 | End: 2019-06-25

## 2019-06-25 RX ORDER — ONDANSETRON 2 MG/ML
4 INJECTION INTRAMUSCULAR; INTRAVENOUS EVERY 6 HOURS PRN
Status: DISCONTINUED | OUTPATIENT
Start: 2019-06-25 | End: 2019-06-26 | Stop reason: HOSPADM

## 2019-06-25 RX ORDER — ONDANSETRON 2 MG/ML
4 INJECTION INTRAMUSCULAR; INTRAVENOUS ONCE
Status: COMPLETED | OUTPATIENT
Start: 2019-06-25 | End: 2019-06-25

## 2019-06-25 RX ORDER — HEPARIN SODIUM 10000 [USP'U]/ML
5000 INJECTION, SOLUTION INTRAVENOUS; SUBCUTANEOUS EVERY 8 HOURS
Status: DISCONTINUED | OUTPATIENT
Start: 2019-06-25 | End: 2019-06-26 | Stop reason: HOSPADM

## 2019-06-25 RX ORDER — 0.9 % SODIUM CHLORIDE 0.9 %
10 VIAL (ML) INJECTION 2 TIMES DAILY
Status: DISCONTINUED | OUTPATIENT
Start: 2019-06-25 | End: 2019-06-26 | Stop reason: HOSPADM

## 2019-06-25 RX ORDER — HYDRALAZINE HYDROCHLORIDE 20 MG/ML
10 INJECTION INTRAMUSCULAR; INTRAVENOUS EVERY 4 HOURS PRN
Status: DISCONTINUED | OUTPATIENT
Start: 2019-06-25 | End: 2019-06-26 | Stop reason: HOSPADM

## 2019-06-25 RX ORDER — ASPIRIN 81 MG/1
81 TABLET ORAL DAILY
Status: DISCONTINUED | OUTPATIENT
Start: 2019-06-25 | End: 2019-06-26 | Stop reason: HOSPADM

## 2019-06-25 RX ORDER — ACETAMINOPHEN 325 MG/1
650 TABLET ORAL EVERY 4 HOURS PRN
Status: DISCONTINUED | OUTPATIENT
Start: 2019-06-25 | End: 2019-06-26 | Stop reason: HOSPADM

## 2019-06-25 RX ORDER — VERAPAMIL HYDROCHLORIDE 240 MG/1
240 TABLET, FILM COATED, EXTENDED RELEASE ORAL NIGHTLY
Status: DISCONTINUED | OUTPATIENT
Start: 2019-06-25 | End: 2019-06-25

## 2019-06-25 RX ORDER — SUCRALFATE ORAL 1 G/10ML
1 SUSPENSION ORAL 4 TIMES DAILY
Qty: 1200 ML | Refills: 1 | Status: SHIPPED | OUTPATIENT
Start: 2019-06-25 | End: 2019-07-06 | Stop reason: ALTCHOICE

## 2019-06-25 RX ORDER — ONDANSETRON 4 MG/1
4 TABLET, FILM COATED ORAL EVERY 8 HOURS PRN
Qty: 30 TABLET | Refills: 0 | Status: SHIPPED | OUTPATIENT
Start: 2019-06-25 | End: 2019-07-06 | Stop reason: ALTCHOICE

## 2019-06-25 RX ORDER — NAPROXEN SODIUM 220 MG
220 TABLET ORAL 2 TIMES DAILY WITH MEALS
COMMUNITY
End: 2019-07-06 | Stop reason: ALTCHOICE

## 2019-06-25 RX ORDER — PANTOPRAZOLE SODIUM 40 MG/10ML
40 INJECTION, POWDER, LYOPHILIZED, FOR SOLUTION INTRAVENOUS 2 TIMES DAILY
Status: DISCONTINUED | OUTPATIENT
Start: 2019-06-25 | End: 2019-06-26 | Stop reason: HOSPADM

## 2019-06-25 RX ORDER — PANTOPRAZOLE SODIUM 40 MG/1
40 GRANULE, DELAYED RELEASE ORAL
Qty: 30 EACH | Refills: 1 | Status: SHIPPED | OUTPATIENT
Start: 2019-06-25 | End: 2019-07-06 | Stop reason: ALTCHOICE

## 2019-06-25 RX ORDER — OYSTER SHELL CALCIUM WITH VITAMIN D 500; 200 MG/1; [IU]/1
1 TABLET, FILM COATED ORAL 3 TIMES DAILY
Status: DISCONTINUED | OUTPATIENT
Start: 2019-06-25 | End: 2019-06-26 | Stop reason: HOSPADM

## 2019-06-25 RX ORDER — SODIUM CHLORIDE 0.9 % (FLUSH) 0.9 %
10 SYRINGE (ML) INJECTION PRN
Status: DISCONTINUED | OUTPATIENT
Start: 2019-06-25 | End: 2019-06-26 | Stop reason: HOSPADM

## 2019-06-25 RX ORDER — MORPHINE SULFATE 2 MG/ML
2 INJECTION, SOLUTION INTRAMUSCULAR; INTRAVENOUS ONCE
Status: COMPLETED | OUTPATIENT
Start: 2019-06-25 | End: 2019-06-25

## 2019-06-25 RX ORDER — SODIUM CHLORIDE 0.9 % (FLUSH) 0.9 %
10 SYRINGE (ML) INJECTION EVERY 12 HOURS SCHEDULED
Status: DISCONTINUED | OUTPATIENT
Start: 2019-06-25 | End: 2019-06-26 | Stop reason: HOSPADM

## 2019-06-25 RX ORDER — LATANOPROST 50 UG/ML
1 SOLUTION/ DROPS OPHTHALMIC NIGHTLY
Status: DISCONTINUED | OUTPATIENT
Start: 2019-06-25 | End: 2019-06-26 | Stop reason: HOSPADM

## 2019-06-25 RX ORDER — IPRATROPIUM BROMIDE AND ALBUTEROL SULFATE 2.5; .5 MG/3ML; MG/3ML
1 SOLUTION RESPIRATORY (INHALATION) EVERY 4 HOURS PRN
Status: DISCONTINUED | OUTPATIENT
Start: 2019-06-25 | End: 2019-06-26 | Stop reason: HOSPADM

## 2019-06-25 RX ORDER — ONDANSETRON 4 MG/1
4 TABLET, ORALLY DISINTEGRATING ORAL ONCE
Status: COMPLETED | OUTPATIENT
Start: 2019-06-25 | End: 2019-06-25

## 2019-06-25 RX ORDER — SENNA PLUS 8.6 MG/1
1 TABLET ORAL 2 TIMES DAILY
Qty: 60 TABLET | Refills: 0 | Status: ON HOLD | OUTPATIENT
Start: 2019-06-25 | End: 2019-09-06 | Stop reason: HOSPADM

## 2019-06-25 RX ADMIN — CALCIUM CARBONATE-VITAMIN D TAB 500 MG-200 UNIT 1 TABLET: 500-200 TAB at 22:44

## 2019-06-25 RX ADMIN — SODIUM CHLORIDE 1000 ML: 9 INJECTION, SOLUTION INTRAVENOUS at 14:25

## 2019-06-25 RX ADMIN — ASPIRIN 81 MG: 81 TABLET ORAL at 22:44

## 2019-06-25 RX ADMIN — HYDRALAZINE HYDROCHLORIDE 10 MG: 20 INJECTION INTRAMUSCULAR; INTRAVENOUS at 22:33

## 2019-06-25 RX ADMIN — Medication 10 ML: at 22:32

## 2019-06-25 RX ADMIN — SODIUM CHLORIDE: 9 INJECTION, SOLUTION INTRAVENOUS at 22:31

## 2019-06-25 RX ADMIN — PANTOPRAZOLE SODIUM 40 MG: 40 INJECTION, POWDER, LYOPHILIZED, FOR SOLUTION INTRAVENOUS at 22:33

## 2019-06-25 RX ADMIN — HEPARIN SODIUM 5000 UNITS: 10000 INJECTION INTRAVENOUS; SUBCUTANEOUS at 22:46

## 2019-06-25 RX ADMIN — LATANOPROST 1 DROP: 50 SOLUTION OPHTHALMIC at 22:37

## 2019-06-25 RX ADMIN — LIDOCAINE HYDROCHLORIDE: 20 SOLUTION ORAL; TOPICAL at 23:11

## 2019-06-25 RX ADMIN — MORPHINE SULFATE 2 MG: 2 INJECTION, SOLUTION INTRAMUSCULAR; INTRAVENOUS at 14:26

## 2019-06-25 RX ADMIN — ONDANSETRON 4 MG: 4 TABLET, ORALLY DISINTEGRATING ORAL at 13:04

## 2019-06-25 RX ADMIN — ONDANSETRON 4 MG: 2 INJECTION INTRAMUSCULAR; INTRAVENOUS at 14:28

## 2019-06-25 ASSESSMENT — ENCOUNTER SYMPTOMS
DIARRHEA: 0
VOMITING: 1
WHEEZING: 0
NAUSEA: 1
SHORTNESS OF BREATH: 0
COUGH: 0
RHINORRHEA: 0
SINUS PAIN: 0
STRIDOR: 0
ABDOMINAL PAIN: 1
BLOOD IN STOOL: 0
CONSTIPATION: 1

## 2019-06-25 ASSESSMENT — PAIN DESCRIPTION - LOCATION: LOCATION: ABDOMEN

## 2019-06-25 ASSESSMENT — PAIN SCALES - GENERAL
PAINLEVEL_OUTOF10: 10
PAINLEVEL_OUTOF10: 9
PAINLEVEL_OUTOF10: 10

## 2019-06-25 ASSESSMENT — PAIN DESCRIPTION - PAIN TYPE: TYPE: ACUTE PAIN

## 2019-06-25 NOTE — PROGRESS NOTES
[facility]  [facility]  ACC Note      SUBJECTIVE:  CC: had concerns including Follow-up (seen in ER  nausea and vomiting continues to have along with nausea). Heriberto Davidson is a 81 y.o. presents to the [facility] for ED follow-up. She presented 4 days ago with complaints of aching upper abdominal pain of 3 days duration with associated nausea and constipation. Worse with meals. Labs showed Cr 1.4, around baseline of 1.3, Hb was at 9.8, higher than baseline. CT abdomen-pelvis showed an old L2 fracture and evidence suggestive of enteritis. She received IVF and was hemodynamically stable enough for discharge. Patient still complains of abdominal pain, worse in upper abdomen and with meals, associated nausea and vomiting with constipation (chronic). Patient is very theatrical during visit and is unwilling for continuation of visit, stating that she is going to the ED on her own and will lie on the street. I encouraged her to try out medication that I will prescribe, but she was uninterested. Review of Systems   Constitutional: Negative for chills, fatigue and fever. HENT: Negative for congestion, rhinorrhea and sinus pain. Respiratory: Negative for cough, shortness of breath, wheezing and stridor. Cardiovascular: Negative for chest pain, palpitations and leg swelling. Gastrointestinal: Positive for abdominal pain, constipation, nausea and vomiting. Negative for blood in stool and diarrhea. Genitourinary: Negative for dysuria, frequency and hematuria. Musculoskeletal: Negative for gait problem and myalgias. Skin: Negative for rash. Allergic/Immunologic: Negative for environmental allergies. Neurological: Negative for dizziness, weakness, numbness and headaches. Hematological: Does not bruise/bleed easily. Psychiatric/Behavioral: Negative for suicidal ideas.        Outpatient Medications Marked as Taking for the 6/25/19 encounter (Office Visit) with Bianca Anthony MD   Medication Sig Dispense Refill    naproxen sodium (ALEVE) 220 MG tablet Take 220 mg by mouth 2 times daily (with meals)      verapamil (CALAN SR) 240 MG extended release tablet Take 1 tablet by mouth nightly 90 tablet 1    losartan (COZAAR) 100 MG tablet Take 1 tablet by mouth daily 90 tablet 1    hydrALAZINE (APRESOLINE) 100 MG tablet Take 1 tablet by mouth 3 times daily 270 tablet 1    ferrous sulfate (FE TABS) 325 (65 Fe) MG EC tablet Take 1 tablet by mouth daily (with breakfast) 90 tablet 1    aspirin (GNP ASPIRIN LOW DOSE) 81 MG EC tablet TAKE 1 TABLET BY MOUTH EVERY DAY 90 tablet 1    Naproxen Sodium (ALEVE) 220 MG CAPS Take by mouth 2 times daily as needed for Pain      LUMIGAN 0.01 % SOLN ophthalmic drops PLACE 1 GTT INTO OU QHS  11       I have reviewed all pertinent PMHx, PSHx, FamHx, SocialHx, medications, and allergiesand updated history as appropriate. OBJECTIVE:    VS: BP (!) 172/84   Pulse 81   Temp 98.2 °F (36.8 °C) (Oral)   Resp 18   Ht 5' 3\" (1.6 m)   Wt 119 lb (54 kg)   BMI 21.08 kg/m²   Physical Exam   Constitutional: She is oriented to person, place, and time. She appears well-developed and well-nourished. No distress. HENT:   Head: Normocephalic and atraumatic. Eyes: Pupils are equal, round, and reactive to light. Conjunctivae are normal. No scleral icterus. Cardiovascular: Normal rate, regular rhythm, S1 normal, S2 normal, normal heart sounds and intact distal pulses. No murmur heard. Pulmonary/Chest: Effort normal and breath sounds normal. No respiratory distress. She has no wheezes. She has no rales. Abdominal: Soft. Bowel sounds are normal. She exhibits no distension and no mass. There is tenderness. There is no guarding. Epigastric tenderness   Musculoskeletal: She exhibits no edema or tenderness. Neurological: She is alert and oriented to person, place, and time. No cranial nerve deficit. Skin: Skin is warm and dry.  She is not diaphoretic. Psychiatric:   Agitated, not redirectable       ASSESSMENT/PLAN:  Acute gastritis  - start protonix 40 mg daily, carafate 4 times daily  - zofran 4 mg q 8 PRN for nausea  - she would probably require follow up with [facility] for EGD(she has missed schedule appointments for EGD in the past)    Chronic constipation  - start sennokot 8 mg bid    Patient left prior to end of visit saying that she doesn't want our help and will be going to the Emergency department. Patient is hemodynamically stable and declined efforts to start medication to help relieve symptoms.     I have reviewed my findings and recommendations with Tova Marie and Dr Ar Parsons MD PGY-1   6/25/2019 11:29 AM

## 2019-06-25 NOTE — ED PROVIDER NOTES
allergies. -------------------------------------------------- RESULTS -------------------------------------------------  All laboratory and radiology results have been personally reviewed by myself   LABS:  Results for orders placed or performed during the hospital encounter of 06/25/19   CBC   Result Value Ref Range    WBC 6.8 4.5 - 11.5 E9/L    RBC 3.96 3.50 - 5.50 E12/L    Hemoglobin 10.7 (L) 11.5 - 15.5 g/dL    Hematocrit 35.0 34.0 - 48.0 %    MCV 88.4 80.0 - 99.9 fL    MCH 27.0 26.0 - 35.0 pg    MCHC 30.6 (L) 32.0 - 34.5 %    RDW 15.4 (H) 11.5 - 15.0 fL    Platelets 308 747 - 313 E9/L    MPV 11.0 7.0 - 12.0 fL   Comprehensive Metabolic Panel   Result Value Ref Range    Sodium 135 132 - 146 mmol/L    Potassium 4.0 3.5 - 5.0 mmol/L    Chloride 94 (L) 98 - 107 mmol/L    CO2 27 22 - 29 mmol/L    Anion Gap 14 7 - 16 mmol/L    Glucose 145 (H) 74 - 99 mg/dL    BUN 18 8 - 23 mg/dL    CREATININE 1.5 (H) 0.5 - 1.0 mg/dL    GFR Non-African American 40 >=60 mL/min/1.73    GFR African American 40     Calcium 10.1 8.6 - 10.2 mg/dL    Total Protein 8.5 (H) 6.4 - 8.3 g/dL    Alb 4.0 3.5 - 5.2 g/dL    Total Bilirubin 0.2 0.0 - 1.2 mg/dL    Alkaline Phosphatase 81 35 - 104 U/L    ALT 16 0 - 32 U/L    AST 28 0 - 31 U/L   Lactate, Sepsis   Result Value Ref Range    Lactic Acid, Sepsis 2.2 (H) 0.5 - 1.9 mmol/L   Lipase   Result Value Ref Range    Lipase 14 13 - 60 U/L   Troponin   Result Value Ref Range    Troponin 0.03 0.00 - 0.03 ng/mL   EKG 12 Lead   Result Value Ref Range    Ventricular Rate 74 BPM    Atrial Rate 74 BPM    P-R Interval 168 ms    QRS Duration 86 ms    Q-T Interval 430 ms    QTc Calculation (Bazett) 477 ms    P Axis 74 degrees    R Axis 60 degrees    T Axis 70 degrees       RADIOLOGY:  Interpreted by Radiologist.  CT ABDOMEN PELVIS WO CONTRAST Additional Contrast? None   Final Result   ALERT:  THIS IS AN ABNORMAL REPORT   1.  Severe compression fracture L2 vertebral body with approximately   90% loss of vertebral body height with at least a moderate spinal   canal stenosis/thecal sac stenosis, suspected underlying crowding of   cauda equina nerve roots (not visualized on the CT scan, but suspected   given the degree of narrowing), and at least 0.6 cm of retropulsion of   the fracture fragments noted into the spinal canal.   2. Fluid filling of multiple loops of small bowel distally possibly   suggestive of infection, inflammatory bowel disease or enteritis. An   early developing bowel obstruction could also have this appearance. [place]   3. Previous cholecystectomy. Hepatomegaly. Previous appendectomy. 4. Pulmonary nodules in the right lung, the largest which measures   0.24 cm, not definitively identified on previous study, possibly not   imaged, CT surveillance within 6 months recommended. 5. Extensive vascular calcification of the kidneys bilaterally. Thoracic and abdominal aortic vascular calcifications noted. ------------------------- NURSING NOTES AND VITALS REVIEWED ---------------------------   The nursing notes within the ED encounter and vital signs as below have been reviewed. BP (!) 185/77   Pulse 66   Temp 97.9 °F (36.6 °C) (Oral)   Resp 18   Ht 5' 3\" (1.6 m)   Wt 118 lb (53.5 kg)   SpO2 100%   BMI 20.90 kg/m²   Oxygen Saturation Interpretation: Normal      ---------------------------------------------------PHYSICAL EXAM--------------------------------------      Constitutional/General: Alert and oriented x3, well appearing, non toxic in NAD  Head: Normocephalic and atraumatic  Eyes: PERRL, EOMI  Mouth: Oropharynx clear, handling secretions, no trismus  Neck: Supple, full ROM,   Pulmonary: Lungs clear to auscultation bilaterally, no wheezes, rales, or rhonchi. Not in respiratory distress  Cardiovascular:  Regular rate and rhythm, no murmurs, gallops, or rubs. 2+ distal pulses  Abdomen: Mild diffuse abdominal tenderness. No rebound. No guarding. Soft. Nondistended. Extremities: Moves all extremities x 4. Warm and well perfused  Skin: warm and dry without rash  Neurologic: GCS 15,  Psych: Normal Affect    ------------------------------ ED COURSE/MEDICAL DECISION MAKING----------------------  Medications   aspirin EC tablet 81 mg (has no administration in time range)   calcium-vitamin D (OSCAL-500) 500-200 MG-UNIT per tablet 1 tablet (has no administration in time range)   latanoprost (XALATAN) 0.005 % ophthalmic solution 1 drop (has no administration in time range)   pantoprazole (PROTONIX) injection 40 mg (has no administration in time range)     And   sodium chloride (PF) 0.9 % injection 10 mL (has no administration in time range)   sodium chloride flush 0.9 % injection 10 mL (has no administration in time range)   sodium chloride flush 0.9 % injection 10 mL (has no administration in time range)   magnesium hydroxide (MILK OF MAGNESIA) 400 MG/5ML suspension 30 mL (has no administration in time range)   ondansetron (ZOFRAN) injection 4 mg (has no administration in time range)   acetaminophen (TYLENOL) tablet 650 mg (has no administration in time range)   heparin (porcine) injection 5,000 Units (has no administration in time range)   0.9 % sodium chloride infusion (has no administration in time range)   aluminum & magnesium hydroxide-simethicone (MAALOX) 30 mL, lidocaine viscous hcl (XYLOCAINE) 5 mL (GI COCKTAIL) (has no administration in time range)   hydrALAZINE (APRESOLINE) injection 10 mg (has no administration in time range)   ondansetron (ZOFRAN-ODT) disintegrating tablet 4 mg (4 mg Oral Given 6/25/19 1304)   0.9 % sodium chloride bolus (0 mLs Intravenous Stopped 6/25/19 1530)   ondansetron (ZOFRAN) injection 4 mg (4 mg Intravenous Given 6/25/19 1428)   morphine (PF) injection 2 mg (2 mg Intravenous Given 6/25/19 1426)     Medical Decision Making/ED COURSE:   Patient is an 52-year-old female presenting with abdominal pain, nausea, and vomiting.  She was hypertensive in the ED. Vitals stable. Afebrile. Nontoxic on evaluation. She had diffuse abdominal tenderness but no rebound or guarding. Labs reviewed. Creatinine mildly increased from baseline. Mild lactic acidosis. CBC shows stable anemia. No leukocytosis. UA did not appear infected. Given IVF, morphine, and zofran with some improvement of her symptoms. CT AP shows possible enteritis versus early SBO. Will be admitted for further management. ED Course as of Jun 25 2203   Tue Jun 25, 2019   1755 Patient noted to have severe compression fracture on CT Ap. Tenderness to midline lumbar spine 5/5 strength in bilateral upper and lower extremities. Normal sensation in upper and lower extremities. No bowel or bladder incontinence. No saddle anesthesia. No fevers. Normal gait. No signs or symptoms of spinal cord compression. CT abdomen and pelvis shows L2 fracture with retropulsion. She has no clinical signs of cauda equina. [JA]   56 Spoke with Dr. Adelaida Thomas. Will admit. Patient has no fevers or leukocytosis. CT AP showing possible enteritis. Plan to observe off antibiotics. [JA]      ED Course User Index  [JA] oRdrigo Razo MD       Counseling: The emergency provider has spoken with the patient and discussed todays results, in addition to providing specific details for the plan of care and counseling regarding the diagnosis and prognosis. Questions are answered at this time and they are agreeable with the plan.      --------------------------------- IMPRESSION AND DISPOSITION ---------------------------------    IMPRESSION  1. Generalized abdominal pain    2. Constipation, unspecified constipation type    3. Closed fracture of second lumbar vertebra, unspecified fracture morphology[facility]    4. Essential hypertension        DISPOSITION  Disposition: Admit to med/surg floor  Patient condition is stable      NOTE: This report was transcribed using voice recognition software. Every effort was made to ensure accuracy; however, inadvertent computerized transcription errors may be present       Baltazar Garcia MD  06/25/19 4646

## 2019-06-25 NOTE — PROGRESS NOTES
Upon seeing pt she refuse to listen to Dr's plan of Care and was very adement  about going back to ER.  Dr. Heller Bidrené to explain to her to try this approach but she refused to listen to . And insisted she is going

## 2019-06-25 NOTE — H&P
[facility]  Internal Medicine Residency Program  History and Physical    Patient:  Cesar Segura 81 y.o. female MRN: [id number]     Date of Service: 6/25/2019    Hospital Day: 1      Chief complaint: had concerns including Abdominal Pain (worsening abdominal pain, n/v and constipation, states worse since previous visit). History of Present Illness   The patient is a 81 y.o. female with PMH HTN, CKD, COPD, osteopenia with previous compression fractures, chronic constipation with HAY with FIT+ as of 11/2018. Patient presented to the ED today with worsening generalized abdominal pain that gets worse with eating and drinking, nausea and bilious vomiting (last episode this am) fatigue and weakness as well as dizziness as she was trying to get off of bed, she states that she is afraid if she gets back home she will fall as she lives alone, she noted that all symptoms have been going on for months, she also stated that she has chronic constipation, never able to have a bowel movement without stool softeners (last bowel movement was 2 days ago and she is not passing gas), 35 pound unintentional weight loss  weight loss over the previous 6 months. She denied any headaches, weakness, CP, palpitations, hematemesis, hematochezia, melena    Patient was recently seen in the ED on 6/21 CT A/P was done and it showed the same findings.  -She was also seen in urgent care this am, patient was so upset with her treatment plan and left, she stated that she will go to the ED herself    ED Course:  -In the ED, VS were sig for /77  -Labs were sig for LA 2.2, Cr 1.5  -CT A/P   Fluid filling of multiple loops of small bowel distally possibly   suggestive of infection, inflammatory bowel disease or enteritis. An   early developing bowel obstruction could also have this appearance.    [place]         Past Medical History:      Diagnosis Date    Anxiety     Arthritis     COPD (chronic obstructive pulmonary no suicidal ideation    Physical Exam   · Vitals: BP (!) 185/77   Pulse 66   Temp 97.9 °F (36.6 °C) (Oral)   Resp 18   Ht 5' 3\" (1.6 m)   Wt 118 lb (53.5 kg)   SpO2 100%   BMI 20.90 kg/m²   Physical Exam   Constitutional: She is oriented to person, place, and time. She appears well-developed and well-nourished. HENT:   Head: Normocephalic and atraumatic. Mouth/Throat: Oropharynx is clear and moist.   Eyes: Pupils are equal, round, and reactive to light. Conjunctivae are normal.   Neck: Normal range of motion. Neck supple. Cardiovascular: Normal rate and regular rhythm. Murmur (Systolic murmur appreciated at the left sternal border) heard. Pulmonary/Chest: Effort normal and breath sounds normal.   Abdominal: Soft. Bowel sounds are normal. She exhibits no distension and no mass. There is tenderness (Diffuse abdominal tenderness). There is no rebound and no guarding. Non hyperactive BS   Musculoskeletal: Normal range of motion. She exhibits no edema. Neurological: She is alert and oriented to person, place, and time. She displays normal reflexes. Skin: Skin is warm and dry.        Labs and Imaging Studies   Basic Labs  Recent Labs     19  1239      K 4.0   CL 94*   CO2 27   BUN 18   CREATININE 1.5*   GLUCOSE 145*   CALCIUM 10.1       Recent Labs     19  1239   WBC 6.8   RBC 3.96   HGB 10.7*   HCT 35.0   MCV 88.4   MCH 27.0   MCHC 30.6*   RDW 15.4*      MPV 11.0       CBC:   Lab Results   Component Value Date    WBC 6.8 2019    RBC 3.96 2019    HGB 10.7 2019    HCT 35.0 2019    MCV 88.4 2019    RDW 15.4 2019     2019     BMP:    Lab Results   Component Value Date     2019    K 4.0 2019    K 3.7 10/24/2018    CL 94 2019    CO2 27 2019    BUN 18 2019       Imaging Studies:     Ct Abdomen Pelvis Wo Contrast Additional Contrast? None    Result Date: 2019  Patient MRN: [id number] : 1935 Age:  81 years Order Date: 6/25/2019 2:15 PM. Gender: Female Exam: CT ABDOMEN PELVIS WO CONTRAST Number of Images: 318 Indication:   Worsening abdominal pain, nausea, vomiting constipation. Contrast dosage: None Comparison: CT abdomen pelvis 6/21/2019. Findings: This examination was performed on a CT scanner with dose reduction. Technique: Low-dose CT  acquisition technique included one of following options; 1 . Automated exposure control, 2. Adjustment of MA and or KV according to patient's size or 3. Use of iterative reconstruction. Lack of IV contrast limits the interpretation and the sensitivity of the exam in the evaluation of solid organs and vasculature, including but not limited to lack of detection of underlying mass or malignancy and/or any other potential abnormalities. Visualized lung bases demonstrate no evidence of, pneumothorax, hemothorax, pleural effusion or focal areas of airspace consolidation. . Pulmonary nodule right lung (series 4, image 3 measuring approximately 0.24 cm, not visualized on previous exam. Pulmonary nodule right lung (series 4 image 2, measured at approximately 0.19 cm, also not seen on previous exam. No focal area of infiltrate/pneumonia, pneumothorax or pleural effusion. Severe compression fracture of L2 vertebral body, with approximately 90% loss of vertebral body heights, with retropulsion of fracture fragments of at least 0.6 cm is noted. There is narrowing of the spinal canal at this region down to approximately 0.77 cm. Previous cholecystectomy. Hepatomegaly. Thoracic and abdominal aortic vascular calcifications. Visualized portions of the spleen, esophagus, stomach and pancreas are grossly unremarkable. Adrenal glands and kidneys grossly unremarkable extensive vascular calcifications within the bilateral renal pedicles. Previous appendectomy. No abnormally enlarged lymphadenopathy definitively identified on limited visualization.  There is fluid filling of or KV according to patient's size or 3. Use of iterative reconstruction. FINDINGS: CHEST: The lung bases are clear, with no evidence of acute cardiopulmonary or pleural pathology. Cardiomegaly persists, and there appears to be a small retrocardiac hiatus hernia. There is dense descending aortic intimal calcification. LIVER: The liver is uniform in parenchymal density, and no focal lesions are identified. GALLBLADDER AND BILIARY TREE: The gallbladder is surgically absent, and there is no biliary ductal ectasia. SPLEEN:The spleen is not enlarged, and shows no focal pathology. ADRENALS:  The adrenals are normal in appearance bilaterally. PANCREAS:The pancreas shows no evidence of acute inflammation or mass lesions. There appears to be a degree of pancreatic atrophy. KIDNEYS/BLADDER: The kidneys show uniform cortical enhancement and no evidence of outflow obstruction. A 15 mm diameter lateral exophytic cortical cyst of the right kidney is benign. The degree of age-related cortical atrophy is noted. There is no perinephric inflammatory change. Ureters are similar in course and caliber, and the bladder is normal in appearance. APPENDIX:  Surgically absent BOWEL:  There is fluid-filled ileum extending to the right lower quadrant surgical anastomotic staple line and focal dilated distal small bowel (not new), but there is no associated fold thickening or mural thickening, no evidence of a perforated viscus. The cecum is in the superior iliac right lateral abdomen, and a sending colon shows very subtle decreased mural density which may indicate mild or early colitis. The transverse and left hemicolonic segments are unremarkable. Atiya Han PELVIS: There is no dependent free pelvic fluid or pelvic adenopathy. LYMPHATICS:There is no mesenteric or retroperitoneal adenopathy. VASCULAR: There is no evidence of acute vascular pathology involving mesenteric or retroperitoneal great vessels.  Extensive atherosclerotic arterial calcification is noted throughout the abdomen and pelvis. SKELETAL: The previously documented compression of the L2 vertebrae with a retropulsed fragment narrowing the AP diameter of the central canal by 8.6 mm has increased in severity and compression since the prior exam. Skeletal structures are otherwise unchanged. 1. There is progressive collapse of the L2 vertebrae with a retropulsed fragment narrowing the AP diameter of the central spinal canal. This injury was initially described on the prior comparison study of March 23, 2017 2. There is some fluid-filled mid to lower abdomen ileal loops which could be manifestations of enteritis, and which are nonspecific. They're not associated with perforation. A short segment of dilated bowel in the right lower quadrant is likely distal small bowel associated with anastomotic staple line, and is not substantially changed from the prior comparison study. It is currently fluid-filled. 3. Incidental age-related findings include atherosclerotic changes, degenerative skeletal findings, a retrocardiac hiatus hernia, and cardiomegaly without decompensation. Resident's Assessment and Plan     Donato Kaba is a 81 y.o. female    Assessment:  1. Possible gastroenteritis likely 2/2 excessive NSAIDs use, r/o SBO vs. Chronic mesenteric ischemia, pain worsens after eating vs. PUD - CT WO contrast showed fluid filled SB loops, likely enteritis, possible SBO - Not changed from CT on 6/21  2. Elevated Cr in the setting of CKD, likely pre-renal 2/2 dehydration with  excessive vomiting, r/o intrinsic renal in the setting of excessive NSAIDs use  3. Dizziness, likely postural hypotension 2/2 dehydration from excessive vomiting  4. Mild lactic acidosis, in the setting of excessive vomiting  5. Elevated Troponin, trending down, no EKG changes or CP  6. Chronic constipation with HAY and unintentional weight loss, r/o malignancy - FIT test+ as of 11/2018  7. HTN  8.  Osteopenia with compression fractures    Plan:  1. Will keep NPO for now, Protonix, s/p 1 GI cocktail   2. Will hold off to stool softeners until r/o SBO - General surgery consult input appreciated  3. Will continue IVF, trend LA Q6H, urinlytes, check orthostatics  4. Will check another level, CK, CK-MB  5. Follow Iron studies  6.  Hold Losartan, use IV PRNs for now until r/o SBO      PT/OT evaluation: Not yet  DVT prophylaxis/ GI prophylaxis: Heparin/Protonix  Disposition: Observation    Alondra Richey MD, PGY-1   Attending physician: Dr. Joy Nance

## 2019-06-25 NOTE — PROGRESS NOTES
Attending Physician Statement  I have discussed the case, including pertinent history and exam findings with the resident. I agree with the assessment, plan and orders as documented by the resident. Pt presented for ED follow up, visited ED twice recently for NV for three days. No BM reported. Lab in ED normal, HO CKD and chronic anemia. CT abdomen showed mild enteritis without significance. IV fluid given with stable VS. Pt still has Nausea and abdominal pain. Pt has been induced the vomiting without clear reason. Epigastric tenderness, however pt has not been having follow up with surgery. Trial of low dose PPI and Carafate for 3-4 weeks, and follow up. BP elevated today as pt did not take med prior to this visit.   Ana Cristina Queen

## 2019-06-26 VITALS
RESPIRATION RATE: 16 BRPM | BODY MASS INDEX: 20.91 KG/M2 | HEART RATE: 74 BPM | TEMPERATURE: 97.6 F | OXYGEN SATURATION: 96 % | HEIGHT: 63 IN | DIASTOLIC BLOOD PRESSURE: 62 MMHG | WEIGHT: 118 LBS | SYSTOLIC BLOOD PRESSURE: 147 MMHG

## 2019-06-26 LAB
ANION GAP SERPL CALCULATED.3IONS-SCNC: 11 MMOL/L (ref 7–16)
BASOPHILS ABSOLUTE: 0.06 E9/L (ref 0–0.2)
BASOPHILS RELATIVE PERCENT: 1.1 % (ref 0–2)
BUN BLDV-MCNC: 14 MG/DL (ref 8–23)
CALCIUM SERPL-MCNC: 9.1 MG/DL (ref 8.6–10.2)
CHLORIDE BLD-SCNC: 104 MMOL/L (ref 98–107)
CHLORIDE URINE RANDOM: 87 MMOL/L
CO2: 25 MMOL/L (ref 22–29)
CREAT SERPL-MCNC: 1.3 MG/DL (ref 0.5–1)
CREATININE URINE: 68 MG/DL (ref 29–226)
EKG ATRIAL RATE: 74 BPM
EKG P AXIS: 74 DEGREES
EKG P-R INTERVAL: 168 MS
EKG Q-T INTERVAL: 430 MS
EKG QRS DURATION: 86 MS
EKG QTC CALCULATION (BAZETT): 477 MS
EKG R AXIS: 60 DEGREES
EKG T AXIS: 70 DEGREES
EKG VENTRICULAR RATE: 74 BPM
EOSINOPHILS ABSOLUTE: 0 E9/L (ref 0.05–0.5)
EOSINOPHILS RELATIVE PERCENT: 0 % (ref 0–6)
GFR AFRICAN AMERICAN: 47
GFR NON-AFRICAN AMERICAN: 47 ML/MIN/1.73
GLUCOSE BLD-MCNC: 99 MG/DL (ref 74–99)
HCT VFR BLD CALC: 27.8 % (ref 34–48)
HCT VFR BLD CALC: 29.9 % (ref 34–48)
HEMOGLOBIN: 8.5 G/DL (ref 11.5–15.5)
HEMOGLOBIN: 9.1 G/DL (ref 11.5–15.5)
IMMATURE GRANULOCYTES #: 0.02 E9/L
IMMATURE GRANULOCYTES %: 0.4 % (ref 0–5)
LYMPHOCYTES ABSOLUTE: 1.63 E9/L (ref 1.5–4)
LYMPHOCYTES RELATIVE PERCENT: 29 % (ref 20–42)
MAGNESIUM: 2.5 MG/DL (ref 1.6–2.6)
MCH RBC QN AUTO: 26.8 PG (ref 26–35)
MCH RBC QN AUTO: 27.1 PG (ref 26–35)
MCHC RBC AUTO-ENTMCNC: 30.4 % (ref 32–34.5)
MCHC RBC AUTO-ENTMCNC: 30.6 % (ref 32–34.5)
MCV RBC AUTO: 88.2 FL (ref 80–99.9)
MCV RBC AUTO: 88.5 FL (ref 80–99.9)
METER GLUCOSE: 97 MG/DL (ref 74–99)
MONOCYTES ABSOLUTE: 0.63 E9/L (ref 0.1–0.95)
MONOCYTES RELATIVE PERCENT: 11.2 % (ref 2–12)
NEUTROPHILS ABSOLUTE: 3.29 E9/L (ref 1.8–7.3)
NEUTROPHILS RELATIVE PERCENT: 58.3 % (ref 43–80)
PDW BLD-RTO: 15.6 FL (ref 11.5–15)
PDW BLD-RTO: 15.6 FL (ref 11.5–15)
PLATELET # BLD: 229 E9/L (ref 130–450)
PLATELET # BLD: 234 E9/L (ref 130–450)
PMV BLD AUTO: 10.6 FL (ref 7–12)
PMV BLD AUTO: 10.6 FL (ref 7–12)
POTASSIUM REFLEX MAGNESIUM: 4.1 MMOL/L (ref 3.5–5)
POTASSIUM, UR: 13.1 MMOL/L
RBC # BLD: 3.14 E12/L (ref 3.5–5.5)
RBC # BLD: 3.39 E12/L (ref 3.5–5.5)
SODIUM BLD-SCNC: 140 MMOL/L (ref 132–146)
SODIUM URINE: 102 MMOL/L
UREA NITROGEN, UR: 350 MG/DL (ref 800–1666)
WBC # BLD: 5.6 E9/L (ref 4.5–11.5)
WBC # BLD: 6.1 E9/L (ref 4.5–11.5)

## 2019-06-26 PROCEDURE — 93010 ELECTROCARDIOGRAM REPORT: CPT | Performed by: INTERNAL MEDICINE

## 2019-06-26 PROCEDURE — 80048 BASIC METABOLIC PNL TOTAL CA: CPT

## 2019-06-26 PROCEDURE — 36415 COLL VENOUS BLD VENIPUNCTURE: CPT

## 2019-06-26 PROCEDURE — 84133 ASSAY OF URINE POTASSIUM: CPT

## 2019-06-26 PROCEDURE — 84540 ASSAY OF URINE/UREA-N: CPT

## 2019-06-26 PROCEDURE — 85025 COMPLETE CBC W/AUTO DIFF WBC: CPT

## 2019-06-26 PROCEDURE — 83735 ASSAY OF MAGNESIUM: CPT

## 2019-06-26 PROCEDURE — C9113 INJ PANTOPRAZOLE SODIUM, VIA: HCPCS | Performed by: INTERNAL MEDICINE

## 2019-06-26 PROCEDURE — 85027 COMPLETE CBC AUTOMATED: CPT

## 2019-06-26 PROCEDURE — 6360000002 HC RX W HCPCS: Performed by: INTERNAL MEDICINE

## 2019-06-26 PROCEDURE — 96372 THER/PROPH/DIAG INJ SC/IM: CPT

## 2019-06-26 PROCEDURE — 82436 ASSAY OF URINE CHLORIDE: CPT

## 2019-06-26 PROCEDURE — 82570 ASSAY OF URINE CREATININE: CPT

## 2019-06-26 PROCEDURE — 82962 GLUCOSE BLOOD TEST: CPT

## 2019-06-26 PROCEDURE — 84300 ASSAY OF URINE SODIUM: CPT

## 2019-06-26 PROCEDURE — 2580000003 HC RX 258: Performed by: INTERNAL MEDICINE

## 2019-06-26 PROCEDURE — 6370000000 HC RX 637 (ALT 250 FOR IP): Performed by: INTERNAL MEDICINE

## 2019-06-26 PROCEDURE — 99220 PR INITIAL OBSERVATION CARE/DAY 70 MINUTES: CPT | Performed by: INTERNAL MEDICINE

## 2019-06-26 PROCEDURE — G0378 HOSPITAL OBSERVATION PER HR: HCPCS

## 2019-06-26 RX ADMIN — Medication 10 ML: at 10:05

## 2019-06-26 RX ADMIN — HEPARIN SODIUM 5000 UNITS: 10000 INJECTION INTRAVENOUS; SUBCUTANEOUS at 06:50

## 2019-06-26 RX ADMIN — CALCIUM CARBONATE-VITAMIN D TAB 500 MG-200 UNIT 1 TABLET: 500-200 TAB at 10:05

## 2019-06-26 RX ADMIN — Medication 10 ML: at 10:08

## 2019-06-26 RX ADMIN — PANTOPRAZOLE SODIUM 40 MG: 40 INJECTION, POWDER, LYOPHILIZED, FOR SOLUTION INTRAVENOUS at 10:08

## 2019-06-26 RX ADMIN — ASPIRIN 81 MG: 81 TABLET ORAL at 10:06

## 2019-06-26 ASSESSMENT — PAIN SCALES - GENERAL: PAINLEVEL_OUTOF10: 0

## 2019-06-26 NOTE — PROGRESS NOTES
Patient currently refusing IVF, threatening to pull out if not disconnected. IVF disconnected. Dr. Suni Mike on unit, notified.      Levonne Gowers RN, BSN

## 2019-06-26 NOTE — PROGRESS NOTES
[facility]  Internal Medicine Residency Program  Progress Note - House Team 2      Medical student note in chart for education only. Please refer to attending physician of record for assessment/plan. Patient:  Satnam Coreas 81 y.o. female MRN: [id number]     Date of Service: 6/26/2019     CC: had concerns including Abdominal Pain (worsening abdominal pain, n/v and constipation, states worse since previous visit). Overnight events:   -EGD/Colonoscopy for Friday per general surgery    Subjective     Patient seen and examined this am at bedside. Patient states she is having diffuse pains, in her abdomen but not localized and also in her back and hips. She denies any vomiting since admission. States she is still constipated. Denies any fevers, chills, but states she still has dizziness upon standing and has some fatigue and weakness. Objective     Physical Exam:  · Vitals: BP (!) 141/75   Pulse 80   Temp 97.4 °F (36.3 °C)   Resp 16   Ht 5' 3\" (1.6 m)   Wt 118 lb (53.5 kg)   SpO2 95%   BMI 20.90 kg/m²     · I & O - 24hr: No intake/output data recorded. Constitutional: She is oriented to person, place, and time. She appears well-developed and well-nourished. HENT:   Head: Normocephalic and atraumatic. Mouth/Throat: Oropharynx is clear and moist.   Eyes: Pupils are equal, round, and reactive to light. Conjunctivae are normal.   Neck: Normal range of motion. Neck supple. Cardiovascular: Normal rate and regular rhythm. No rubs or gallops. Systolic murmur at the left sternal border. Pulmonary/Chest: Effort normal and breath sounds normal. Clear to auscultation bilaterally. No wheezes, rales or rhonchi. Abdominal: Soft. Bowel sounds are normal. She exhibits no distension and no mass. There is no rebound and no guarding. Diffusely tender. Musculoskeletal: Normal range of motion. She exhibits no edema. Neurological: She is alert and oriented to person, place, and time. She displays normal reflexes. Skin: Skin is warm and dry. Subject  Pertinent Labs & Imaging Studies   rohit  CBC:   Lab Results   Component Value Date    WBC 5.6 06/26/2019    RBC 3.14 06/26/2019    HGB 8.5 06/26/2019    HCT 27.8 06/26/2019    MCV 88.5 06/26/2019    MCH 27.1 06/26/2019    MCHC 30.6 06/26/2019    RDW 15.6 06/26/2019     06/26/2019    MPV 10.6 06/26/2019     CMP:    Lab Results   Component Value Date     06/26/2019    K 4.1 06/26/2019     06/26/2019    CO2 25 06/26/2019    BUN 14 06/26/2019    CREATININE 1.3 06/26/2019    GFRAA 47 06/26/2019    LABGLOM 47 06/26/2019    GLUCOSE 99 06/26/2019    GLUCOSE 121 11/29/2011    PROT 8.5 06/25/2019    LABALBU 4.0 06/25/2019    LABALBU 3.3 11/29/2011    CALCIUM 9.1 06/26/2019    BILITOT 0.2 06/25/2019    ALKPHOS 81 06/25/2019    AST 28 06/25/2019    ALT 16 06/25/2019     Last 3 Troponin:    Lab Results   Component Value Date    TROPONINI 0.02 06/25/2019    TROPONINI 0.03 06/25/2019    TROPONINI 0.04 06/21/2019     IRON:    Lab Results   Component Value Date    IRON 26 06/25/2019     Iron Saturation:  No components found for: PERCENTFE  TIBC:    Lab Results   Component Value Date    TIBC 272 06/25/2019     FERRITIN:    Lab Results   Component Value Date    FERRITIN 47 06/25/2019       Medical Student's Assessment and Plan     Sharee Todd is a 81 y.o. female with a signficant PMH of anxiety, arthritis, COPD, DM, HTN, osteoarthritis, osteopenia with compression fractures, CKD stage 3a, and chronic constipation with +FIT in 11/2018 who presents with worsening generalized abdominal pain and nausea with bilious vomiting. Assessment:  1.  Possible gastroenteritis likely 2/2 excessive NSAIDs use  - r/o SBO vs. Chronic mesenteric ischemia, pain worsens after eating vs. PUD   -CT WO contrast showed fluid filled SB loops, likely enteritis, possible SBO - Not changed from CT on 6/21   -GI cocktail x1  -General surgery consulted  -Protonix 40mg

## 2019-06-26 NOTE — PROGRESS NOTES
06/26/2019    MONOSABS 0.63 06/26/2019    LYMPHSABS 1.63 06/26/2019    EOSABS 0.00 06/26/2019    BASOSABS 0.06 06/26/2019     BMP:    Lab Results   Component Value Date     06/26/2019    K 4.1 06/26/2019     06/26/2019    CO2 25 06/26/2019    BUN 14 06/26/2019    LABALBU 4.0 06/25/2019    LABALBU 3.3 11/29/2011    CREATININE 1.3 06/26/2019    CALCIUM 9.1 06/26/2019    GFRAA 47 06/26/2019    LABGLOM 47 06/26/2019    GLUCOSE 99 06/26/2019    GLUCOSE 121 11/29/2011       Resident's Assessment and Plan     Satnam Coreas is a 81 y.o. female with PMH HTN, CKD, COPD, osteopenia with previous compression fractures, chronic constipation with HAY with FIT+ as of 11/2018 presented to ED with worsening generalized abd pain. 1. Abdominal pain likely 2/2 multifactorial chronic mesenteric ischemia, PUD 2/2 NSAID use, r/o SBO, CT abd wo contrast showed fluid filled SB loops, suggesting infection, inflammatory bowel disease or enteritis  2. KALPESH on CKD stage 3-improved after hydration  3. Chronic iron deficiency anemia with positive FIT test, need to rule out colon cancer, Hgb stable at her baseline  4. Severe compression fracture L2 vertebral body-asymptomatic  5. HTN  6.  Lactic acidosis-resolved    · Per GS, planning for EGD and colonoscopy on Friday, input appreciated  · Resumed diet  · D/jacy fluid    DVT prophylaxis/ GI prophylaxis: Heparin/PPI    Yodit Gould MD, PGY-2  Internal Medicine Resident    Attending physician: Dr. Sigifredo Lopez

## 2019-06-26 NOTE — CONSULTS
GENERAL SURGERY  CONSULT NOTE  6/25/2019    Physician Consulted: Dr. Mehreen Aceves  Reason for Consult: Abdominal pain, constipation and bilious vomiting, possible obstruction on CT scan, fit test+  Referring Physician: Dr. Berle Goldberg    SIMI Sunshine is a 81 y.o. female who presents for evaluation of abdominal pain. Patient is a moderately poor historian. Patient states she has had epigastric abdominal pain for approximately 1 year. She states that the pain is worsened by eating and that recently she has had increased nausea and vomiting when she eats. She denies any hematemesis or coffee-ground emesis. She reports chronic constipation and is only able to have bowel movements when she takes stool softeners. Her last bowel movement was 3 days ago, she denies any melena, hematochezia. She reports a 35 pound weight loss over the previous 6 months. She denies any family or personal history of colon cancer. She is unsure when her last EGD/colonoscopy. She is on aspirin 81 mg. She has a history of open cholecystectomy and appendectomy per EMR. Review of CT demonstrated staple line in the right lower quadrant consistent with likely ileocecectomy. She had a FIT test which was positive in 11/5/2018 but has not followed up in the general surgery clinic. Past Medical History:   Diagnosis Date    Anxiety     Arthritis     [facility]     Diabetes mellitus ([facility]     Hypertension     Osteoarthritis        Past Surgical History:   Procedure Laterality Date    APPENDECTOMY      CHOLECYSTECTOMY      EYE SURGERY         Medications Prior to Admission:    Prior to Admission medications    Medication Sig Start Date End Date Taking?  Authorizing Provider   naproxen sodium (ALEVE) 220 MG tablet Take 220 mg by mouth 2 times daily (with meals)   Yes Historical Provider, MD   PROTONIX 40 MG PACK packet Take 1 packet by mouth every morning (before breakfast) 6/25/19  Yes Celestino Thurman MD   sucralfate (CARAFATE) 1 GM/10ML suspension Take 10 mLs by mouth 4 times daily 6/25/19  Yes Celestino Thurman MD   senna (SENOKOT) 8.6 MG tablet Take 1 tablet by mouth 2 times daily 6/25/19  Yes Celestino Thurman MD   ondansetron (ZOFRAN) 4 MG tablet Take 1 tablet by mouth every 8 hours as needed for Nausea or Vomiting 6/25/19  Yes Celestino Thurman MD   verapamil (CALAN SR) 240 MG extended release tablet Take 1 tablet by mouth nightly 6/5/19  Yes Tamara Reeder MD   losartan (COZAAR) 100 MG tablet Take 1 tablet by mouth daily 6/5/19  Yes Tamara Reeder MD   hydrALAZINE (APRESOLINE) 100 MG tablet Take 1 tablet by mouth 3 times daily 6/5/19  Yes Tamara Reeder MD   ferrous sulfate (FE TABS) 325 (65 Fe) MG EC tablet Take 1 tablet by mouth daily (with breakfast) 1/16/19  Yes Erik Collado MD   aspirin (GNP ASPIRIN LOW DOSE) 81 MG EC tablet TAKE 1 TABLET BY MOUTH EVERY DAY 1/16/19  Yes Erik Collado MD   Calcium Carb-Cholecalciferol 600-500 MG-UNIT CAPS Take 1 tablet by mouth 3 times daily otc   Yes Historical Provider, MD   acetaminophen (TYLENOL 8 HOUR ARTHRITIS PAIN) 650 MG extended release tablet Take 650 mg by mouth every 8 hours as needed for Pain   Yes Historical Provider, MD   Misc. Devices MISC Fabricated T-L-S-O splint/device-- for L2 acute compression fracture 1/18/17  Yes Zana Rasheed MD   glucose blood VI test strips (ASCENSIA AUTODISC VI;ONE TOUCH ULTRA TEST VI) strip Freestyle glucose strips. May change to match her glucometer. Check blood sugars once a day.  10/26/16  Yes Berhane Reid MD   [employer] Ultra Thin Lancets MISC Monitor blood sugar once a day 10/26/16  Yes Berhane Reid MD   glucose monitoring kit (FREESTYLE) monitoring kit 1 kit by Does not apply route daily 10/26/16  Yes Berhane Reid MD   LUMIGAN 0.01 % SOLN ophthalmic drops PLACE 1 GTT INTO OU QHS 6/22/16  Yes Historical Provider, MD       No Known Allergies    Family History   Problem Relation Age of Onset    Arthritis Sister     Arthritis Maternal Aunt        Social History     Tobacco Use    Smoking status: Former Smoker     Packs/day: 0.50     Years: 10.00     Pack years: 5.00     Types: Cigars     Last attempt to quit: 2014     Years since quittin.4    Smokeless tobacco: Never Used   Substance Use Topics    Alcohol use: No     Alcohol/week: 3.5 oz     Types: 7 Standard drinks or equivalent per week     Comment: 4-15-13 states she hasnt had anything to drink for a couple of months.  Drug use: No         Review of Systems   General ROS: negative  Hematological and Lymphatic ROS: negative  Respiratory ROS: negative  Cardiovascular ROS: negative  Gastrointestinal ROS: as above  Genito-Urinary ROS: negative  Musculoskeletal ROS: negative      PHYSICAL EXAM:    Vitals:    19 2232   BP: (!) 141/75   Pulse: 80   Resp: 16   Temp: 97.7 °F (36.5 °C)   SpO2: 94%       General Appearance:  awake, alert, oriented, in no acute distress  Skin:  Skin color, texture, turgor normal. No rashes or lesions. Head/face:  NCAT  Eyes:  No gross abnormalities. Lungs:  No increased work of breathing   Heart:  RR  Abdomen:  Soft, TTP epigastrium midline to RLQ, non-distended, no rebound or guarding   Extremities: Extremities warm to touch, pink, with no edema. Female Rectal: No hemorrhoids, normal rectal tone, no masses. Stool assess: Consistency- soft and Guiac negative    LABS:  CBC  Recent Labs     19  1239   WBC 6.8   HGB 10.7*   HCT 35.0        BMP  Recent Labs     19  1239      K 4.0   CL 94*   CO2 27   BUN 18   CREATININE 1.5*   CALCIUM 10.1     Liver Function  Recent Labs     19  1239   LIPASE 14   BILITOT 0.2   AST 28   ALT 16   ALKPHOS 81   PROT 8.5*   LABALBU 4.0     No results for input(s): LACTATE in the last 72 hours. No results for input(s): INR, PTT in the last 72 hours.     Invalid input(s): PT    RADIOLOGY  Ct Abdomen Pelvis Wo Contrast Additional Contrast? None    Result Date: 2019  Patient MRN: [id number] : 1935 Age:  81 years Order Date: 2019 2:15 PM. Gender: Female Exam: CT ABDOMEN PELVIS WO CONTRAST Number of Images: 318 Indication:   Worsening abdominal pain, nausea, vomiting constipation. Contrast dosage: None Comparison: CT abdomen pelvis 2019. Findings: This examination was performed on a CT scanner with dose reduction. Technique: Low-dose CT  acquisition technique included one of following options; 1 . Automated exposure control, 2. Adjustment of MA and or KV according to patient's size or 3. Use of iterative reconstruction. Lack of IV contrast limits the interpretation and the sensitivity of the exam in the evaluation of solid organs and vasculature, including but not limited to lack of detection of underlying mass or malignancy and/or any other potential abnormalities. Visualized lung bases demonstrate no evidence of, pneumothorax, hemothorax, pleural effusion or focal areas of airspace consolidation. . Pulmonary nodule right lung (series 4, image 3 measuring approximately 0.24 cm, not visualized on previous exam. Pulmonary nodule right lung (series 4 image 2, measured at approximately 0.19 cm, also not seen on previous exam. No focal area of infiltrate/pneumonia, pneumothorax or pleural effusion. Severe compression fracture of L2 vertebral body, with approximately 90% loss of vertebral body heights, with retropulsion of fracture fragments of at least 0.6 cm is noted. There is narrowing of the spinal canal at this region down to approximately 0.77 cm. Previous cholecystectomy. Hepatomegaly. Thoracic and abdominal aortic vascular calcifications. Visualized portions of the spleen, esophagus, stomach and pancreas are grossly unremarkable. Adrenal glands and kidneys grossly unremarkable extensive vascular calcifications within the bilateral renal pedicles. Previous appendectomy.  No abnormally

## 2019-06-26 NOTE — PROGRESS NOTES
IM Resident Mandy Fernando at the bedside, attempting to discuss treatment plan, Patient refusing any conversation. Patient also refusing to sign AMA form. Patient's God Daughter also attempting to calm patient and persuade her to stay to get treatment. Patient continued to refused.  Walked off unit with no AMA form signed      DANTE Lima

## 2019-06-26 NOTE — CARE COORDINATION
6/26/2019  Referral made to [facility] with [facility] to assess patient and assist with coordination of community services.

## 2019-06-26 NOTE — CARE COORDINATION
Met with patient in the room. She is alert and oriented. She states she lives alone in a [place] rise apartment. Her means of transportation is the bus or a ride from friends. She does all her own home care and ADLs. She states she had home care at one time but does not need it now. Her plan is to return home. Her pharmacy is [facility] on [address] Her pcp is [facility]. Her goal is to return home when she is discharged. Will follow.
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
## 2019-06-26 NOTE — CARE COORDINATION
6/26/2019  Referral made to Leanna at [facility] and patient informaiton faxed for a home visit.

## 2019-06-26 NOTE — PLAN OF CARE
Received page from nurse that patient refuse to wait for EGD and colonoscopy on Friday and insisted to go home. Meet patient in the room, she was dressed up and eating lunch. Her god daughter at bedside. She was oriented x4. Explained in detail to both patient and her god daughter the importance of EGD and colonoscopy and the need of 2 day prep for colonoscopy before GS can do colonoscopy on Friday. However, patient refused to stay. She stated that her abd pain was resolved, denies dizziness or chest pain. Spoken with her the concerns for colon cancer and GI bleeding that's why it is importance to have EGD and colonoscopy. However, patient stated that she was not concerned at this time. When discussed about helping her set up outpatient scope, she said that she will find a doctor herself. Try to get a orthostatic BP on patient, however she refuse to let the nurse do her BP. She walks without difficulty. When ask her to sign AMA, she refused and eloped. Witnessed by god daughter and nurse Rachel Hallman.     Kvng Smith MD  06/26/19  12:53 PM

## 2019-06-26 NOTE — PROGRESS NOTES
[facility]  PROGRESS NOTE  ATTENDING NOTE    Patient was seen prior to her leaving AMA  She yelled at me the entire time I was in the room and started with I was just there trying to suck money out of her insurance company. I tried to go over her history with her and her CT and labs. She did not care. I asked if she was hungry and could I get her something to eat. She opted for chicken broth. I informed her nurse. I did explain to her that in order to help her we needed to run some tests on Friday. She stated she was going to go to another hospital.      I don't believe she would be able to complete her prep at home. She should be seen by a geriatric psychiatrist as I don't think she understands the gravity of her medical issues or has the capacity to make these decisions.     Makayla Soler MD, MSc, FACS  6/26/2019  1:36 PM

## 2019-06-27 ENCOUNTER — TELEPHONE (OUTPATIENT)
Dept: INTERNAL MEDICINE | Age: 84
End: 2019-06-27

## 2019-06-27 NOTE — TELEPHONE ENCOUNTER
[facility] ([facility]) ED Follow up Call    Reason for ED visit:  Abdominal pain     VOICEMAIL DOCUMENTATION - ERASE IF NOT USED  Hi, this message is for  Noelle Dance  This is Yamini Lujan from [facility] ([facility]) office. Just calling to see how you are doing after your recent visit to the Emergency Room. Cintia Meza wants to make sure you were able to fill any prescriptions and that you understand your discharge instructions. Please return our call if you need to make a follow up appointment with your provider or have any further needs. Our phone number is [phone]. Have a great day.

## 2019-07-06 ENCOUNTER — APPOINTMENT (OUTPATIENT)
Dept: GENERAL RADIOLOGY | Age: 84
DRG: 481 | End: 2019-07-06
Payer: COMMERCIAL

## 2019-07-06 ENCOUNTER — HOSPITAL ENCOUNTER (INPATIENT)
Age: 84
LOS: 4 days | Discharge: NURSING, REHAB OR LONG-TERM CARE | DRG: 481 | End: 2019-07-10
Attending: EMERGENCY MEDICINE | Admitting: INTERNAL MEDICINE
Payer: COMMERCIAL
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
## 2019-07-06 DIAGNOSIS — S72.002A CLOSED LEFT HIP FRACTURE, INITIAL ENCOUNTER (HCC): Primary | ICD-10-CM

## 2019-07-06 LAB
ABO/RH: NORMAL
ANION GAP SERPL CALCULATED.3IONS-SCNC: 11 MMOL/L (ref 7–16)
ANTIBODY SCREEN: NORMAL
APTT: 25 SEC (ref 24.5–35.1)
BASOPHILS ABSOLUTE: 0.08 E9/L (ref 0–0.2)
BASOPHILS RELATIVE PERCENT: 1.1 % (ref 0–2)
BUN BLDV-MCNC: 13 MG/DL (ref 8–23)
CALCIUM SERPL-MCNC: 9.5 MG/DL (ref 8.6–10.2)
CHLORIDE BLD-SCNC: 103 MMOL/L (ref 98–107)
CO2: 25 MMOL/L (ref 22–29)
CREAT SERPL-MCNC: 1.2 MG/DL (ref 0.5–1)
EOSINOPHILS ABSOLUTE: 0.07 E9/L (ref 0.05–0.5)
EOSINOPHILS RELATIVE PERCENT: 1 % (ref 0–6)
GFR AFRICAN AMERICAN: 52
GFR NON-AFRICAN AMERICAN: 52 ML/MIN/1.73
GLUCOSE BLD-MCNC: 112 MG/DL (ref 74–99)
HCT VFR BLD CALC: 28.6 % (ref 34–48)
HEMOGLOBIN: 8.6 G/DL (ref 11.5–15.5)
IMMATURE GRANULOCYTES #: 0.05 E9/L
IMMATURE GRANULOCYTES %: 0.7 % (ref 0–5)
INR BLD: 1.1
LYMPHOCYTES ABSOLUTE: 1.34 E9/L (ref 1.5–4)
LYMPHOCYTES RELATIVE PERCENT: 19.3 % (ref 20–42)
MCH RBC QN AUTO: 26.9 PG (ref 26–35)
MCHC RBC AUTO-ENTMCNC: 30.1 % (ref 32–34.5)
MCV RBC AUTO: 89.4 FL (ref 80–99.9)
MONOCYTES ABSOLUTE: 0.73 E9/L (ref 0.1–0.95)
MONOCYTES RELATIVE PERCENT: 10.5 % (ref 2–12)
NEUTROPHILS ABSOLUTE: 4.69 E9/L (ref 1.8–7.3)
NEUTROPHILS RELATIVE PERCENT: 67.4 % (ref 43–80)
PDW BLD-RTO: 15.5 FL (ref 11.5–15)
PLATELET # BLD: 284 E9/L (ref 130–450)
PMV BLD AUTO: 10.7 FL (ref 7–12)
POTASSIUM SERPL-SCNC: 3.9 MMOL/L (ref 3.5–5)
PROTHROMBIN TIME: 12.5 SEC (ref 9.3–12.4)
RBC # BLD: 3.2 E12/L (ref 3.5–5.5)
SODIUM BLD-SCNC: 139 MMOL/L (ref 132–146)
WBC # BLD: 7 E9/L (ref 4.5–11.5)

## 2019-07-06 PROCEDURE — 73552 X-RAY EXAM OF FEMUR 2/>: CPT

## 2019-07-06 PROCEDURE — 99285 EMERGENCY DEPT VISIT HI MDM: CPT

## 2019-07-06 PROCEDURE — 71045 X-RAY EXAM CHEST 1 VIEW: CPT

## 2019-07-06 PROCEDURE — 36415 COLL VENOUS BLD VENIPUNCTURE: CPT

## 2019-07-06 PROCEDURE — 96374 THER/PROPH/DIAG INJ IV PUSH: CPT

## 2019-07-06 PROCEDURE — 73501 X-RAY EXAM HIP UNI 1 VIEW: CPT

## 2019-07-06 PROCEDURE — 80048 BASIC METABOLIC PNL TOTAL CA: CPT

## 2019-07-06 PROCEDURE — 86850 RBC ANTIBODY SCREEN: CPT

## 2019-07-06 PROCEDURE — 6360000002 HC RX W HCPCS: Performed by: STUDENT IN AN ORGANIZED HEALTH CARE EDUCATION/TRAINING PROGRAM

## 2019-07-06 PROCEDURE — 1200000000 HC SEMI PRIVATE

## 2019-07-06 PROCEDURE — 85730 THROMBOPLASTIN TIME PARTIAL: CPT

## 2019-07-06 PROCEDURE — 86901 BLOOD TYPING SEROLOGIC RH(D): CPT

## 2019-07-06 PROCEDURE — 85025 COMPLETE CBC W/AUTO DIFF WBC: CPT

## 2019-07-06 PROCEDURE — 86900 BLOOD TYPING SEROLOGIC ABO: CPT

## 2019-07-06 PROCEDURE — 73560 X-RAY EXAM OF KNEE 1 OR 2: CPT

## 2019-07-06 PROCEDURE — 85610 PROTHROMBIN TIME: CPT

## 2019-07-06 PROCEDURE — 93005 ELECTROCARDIOGRAM TRACING: CPT | Performed by: EMERGENCY MEDICINE

## 2019-07-06 PROCEDURE — 73502 X-RAY EXAM HIP UNI 2-3 VIEWS: CPT

## 2019-07-06 PROCEDURE — 96376 TX/PRO/DX INJ SAME DRUG ADON: CPT

## 2019-07-06 RX ORDER — DEXTROSE AND SODIUM CHLORIDE 5; .45 G/100ML; G/100ML
INJECTION, SOLUTION INTRAVENOUS CONTINUOUS
Status: DISCONTINUED | OUTPATIENT
Start: 2019-07-07 | End: 2019-07-07

## 2019-07-06 RX ORDER — ASPIRIN 81 MG/1
81 TABLET ORAL DAILY
Status: DISCONTINUED | OUTPATIENT
Start: 2019-07-07 | End: 2019-07-07

## 2019-07-06 RX ORDER — SODIUM CHLORIDE 9 MG/ML
INJECTION, SOLUTION INTRAVENOUS CONTINUOUS
Status: DISCONTINUED | OUTPATIENT
Start: 2019-07-07 | End: 2019-07-06

## 2019-07-06 RX ORDER — MORPHINE SULFATE 4 MG/ML
4 INJECTION, SOLUTION INTRAMUSCULAR; INTRAVENOUS
Status: DISCONTINUED | OUTPATIENT
Start: 2019-07-06 | End: 2019-07-07

## 2019-07-06 RX ORDER — NICOTINE POLACRILEX 4 MG
15 LOZENGE BUCCAL PRN
Status: DISCONTINUED | OUTPATIENT
Start: 2019-07-06 | End: 2019-07-10 | Stop reason: HOSPADM

## 2019-07-06 RX ORDER — DEXTROSE MONOHYDRATE 50 MG/ML
100 INJECTION, SOLUTION INTRAVENOUS PRN
Status: DISCONTINUED | OUTPATIENT
Start: 2019-07-06 | End: 2019-07-10 | Stop reason: HOSPADM

## 2019-07-06 RX ORDER — FENTANYL CITRATE 50 UG/ML
50 INJECTION, SOLUTION INTRAMUSCULAR; INTRAVENOUS ONCE
Status: COMPLETED | OUTPATIENT
Start: 2019-07-06 | End: 2019-07-06

## 2019-07-06 RX ORDER — HYDROCODONE BITARTRATE AND ACETAMINOPHEN 5; 325 MG/1; MG/1
1 TABLET ORAL EVERY 6 HOURS PRN
Status: DISCONTINUED | OUTPATIENT
Start: 2019-07-06 | End: 2019-07-07

## 2019-07-06 RX ORDER — SENNA PLUS 8.6 MG/1
1 TABLET ORAL 2 TIMES DAILY
Status: DISCONTINUED | OUTPATIENT
Start: 2019-07-06 | End: 2019-07-07

## 2019-07-06 RX ORDER — DEXTROSE MONOHYDRATE 25 G/50ML
12.5 INJECTION, SOLUTION INTRAVENOUS PRN
Status: DISCONTINUED | OUTPATIENT
Start: 2019-07-06 | End: 2019-07-10 | Stop reason: HOSPADM

## 2019-07-06 RX ORDER — FERROUS SULFATE 325(65) MG
325 TABLET ORAL
Status: DISCONTINUED | OUTPATIENT
Start: 2019-07-07 | End: 2019-07-10 | Stop reason: HOSPADM

## 2019-07-06 RX ADMIN — FENTANYL CITRATE 50 MCG: 50 INJECTION, SOLUTION INTRAMUSCULAR; INTRAVENOUS at 17:54

## 2019-07-06 RX ADMIN — FENTANYL CITRATE 50 MCG: 50 INJECTION, SOLUTION INTRAMUSCULAR; INTRAVENOUS at 14:50

## 2019-07-06 RX ADMIN — MORPHINE SULFATE 4 MG: 4 INJECTION, SOLUTION INTRAMUSCULAR; INTRAVENOUS at 21:01

## 2019-07-06 ASSESSMENT — PAIN DESCRIPTION - ORIENTATION
ORIENTATION: LEFT

## 2019-07-06 ASSESSMENT — ENCOUNTER SYMPTOMS
VISUAL CHANGE: 0
SHORTNESS OF BREATH: 0
PHOTOPHOBIA: 0
CHEST TIGHTNESS: 0
ABDOMINAL PAIN: 0
WHEEZING: 0
BOWEL INCONTINENCE: 0
VOMITING: 0
NAUSEA: 0

## 2019-07-06 ASSESSMENT — PAIN DESCRIPTION - PROGRESSION
CLINICAL_PROGRESSION: NOT CHANGED
CLINICAL_PROGRESSION: NOT CHANGED
CLINICAL_PROGRESSION: GRADUALLY WORSENING

## 2019-07-06 ASSESSMENT — PAIN SCALES - GENERAL
PAINLEVEL_OUTOF10: 8
PAINLEVEL_OUTOF10: 10
PAINLEVEL_OUTOF10: 5
PAINLEVEL_OUTOF10: 9
PAINLEVEL_OUTOF10: 10

## 2019-07-06 ASSESSMENT — PAIN DESCRIPTION - DESCRIPTORS
DESCRIPTORS: ACHING;CONSTANT;SORE
DESCRIPTORS: ACHING
DESCRIPTORS: ACHING;CONSTANT;SHOOTING

## 2019-07-06 ASSESSMENT — PAIN SCALES - WONG BAKER: WONGBAKER_NUMERICALRESPONSE: 8

## 2019-07-06 ASSESSMENT — PAIN DESCRIPTION - LOCATION
LOCATION: HIP

## 2019-07-06 ASSESSMENT — PAIN - FUNCTIONAL ASSESSMENT
PAIN_FUNCTIONAL_ASSESSMENT: PREVENTS OR INTERFERES WITH MANY ACTIVE NOT PASSIVE ACTIVITIES
PAIN_FUNCTIONAL_ASSESSMENT: PREVENTS OR INTERFERES WITH MANY ACTIVE NOT PASSIVE ACTIVITIES

## 2019-07-06 ASSESSMENT — PAIN DESCRIPTION - PAIN TYPE
TYPE: ACUTE PAIN

## 2019-07-06 ASSESSMENT — PAIN DESCRIPTION - ONSET
ONSET: ON-GOING
ONSET: ON-GOING

## 2019-07-06 ASSESSMENT — PAIN DESCRIPTION - FREQUENCY
FREQUENCY: CONTINUOUS

## 2019-07-06 NOTE — ED NOTES
Bed: HF  Expected date:   Expected time:   Means of arrival:   Comments:  Adarsh Nesbitt RN  07/06/19 3336

## 2019-07-07 ENCOUNTER — ANESTHESIA EVENT (OUTPATIENT)
Dept: OPERATING ROOM | Age: 84
DRG: 481 | End: 2019-07-07
Payer: COMMERCIAL

## 2019-07-07 ENCOUNTER — ANESTHESIA (OUTPATIENT)
Dept: OPERATING ROOM | Age: 84
DRG: 481 | End: 2019-07-07
Payer: COMMERCIAL

## 2019-07-07 ENCOUNTER — APPOINTMENT (OUTPATIENT)
Dept: GENERAL RADIOLOGY | Age: 84
DRG: 481 | End: 2019-07-07
Payer: COMMERCIAL

## 2019-07-07 VITALS — SYSTOLIC BLOOD PRESSURE: 224 MMHG | OXYGEN SATURATION: 100 % | DIASTOLIC BLOOD PRESSURE: 94 MMHG | TEMPERATURE: 97.3 F

## 2019-07-07 PROBLEM — E11.65 TYPE 2 DIABETES MELLITUS WITH HYPERGLYCEMIA, WITHOUT LONG-TERM CURRENT USE OF INSULIN (HCC): Chronic | Status: ACTIVE | Noted: 2017-08-01

## 2019-07-07 LAB
ALBUMIN SERPL-MCNC: 3.1 G/DL (ref 3.5–5.2)
ALP BLD-CCNC: 60 U/L (ref 35–104)
ALT SERPL-CCNC: 11 U/L (ref 0–32)
ANION GAP SERPL CALCULATED.3IONS-SCNC: 10 MMOL/L (ref 7–16)
AST SERPL-CCNC: 29 U/L (ref 0–31)
BASOPHILS ABSOLUTE: 0.07 E9/L (ref 0–0.2)
BASOPHILS RELATIVE PERCENT: 0.8 % (ref 0–2)
BILIRUB SERPL-MCNC: 0.2 MG/DL (ref 0–1.2)
BLOOD BANK DISPENSE STATUS: NORMAL
BLOOD BANK PRODUCT CODE: NORMAL
BPU ID: NORMAL
BUN BLDV-MCNC: 15 MG/DL (ref 8–23)
CALCIUM SERPL-MCNC: 9.2 MG/DL (ref 8.6–10.2)
CHLORIDE BLD-SCNC: 103 MMOL/L (ref 98–107)
CO2: 24 MMOL/L (ref 22–29)
CREAT SERPL-MCNC: 1.2 MG/DL (ref 0.5–1)
DESCRIPTION BLOOD BANK: NORMAL
EKG ATRIAL RATE: 72 BPM
EKG P AXIS: 62 DEGREES
EKG P-R INTERVAL: 182 MS
EKG Q-T INTERVAL: 434 MS
EKG QRS DURATION: 84 MS
EKG QTC CALCULATION (BAZETT): 475 MS
EKG R AXIS: 9 DEGREES
EKG T AXIS: 35 DEGREES
EKG VENTRICULAR RATE: 72 BPM
EOSINOPHILS ABSOLUTE: 0 E9/L (ref 0.05–0.5)
EOSINOPHILS RELATIVE PERCENT: 0 % (ref 0–6)
GFR AFRICAN AMERICAN: 52
GFR NON-AFRICAN AMERICAN: 52 ML/MIN/1.73
GLUCOSE BLD-MCNC: 148 MG/DL (ref 74–99)
HCT VFR BLD CALC: 29.2 % (ref 34–48)
HEMOGLOBIN: 8.9 G/DL (ref 11.5–15.5)
IMMATURE GRANULOCYTES #: 0.04 E9/L
IMMATURE GRANULOCYTES %: 0.4 % (ref 0–5)
LYMPHOCYTES ABSOLUTE: 1.42 E9/L (ref 1.5–4)
LYMPHOCYTES RELATIVE PERCENT: 15.8 % (ref 20–42)
MCH RBC QN AUTO: 27.3 PG (ref 26–35)
MCHC RBC AUTO-ENTMCNC: 30.5 % (ref 32–34.5)
MCV RBC AUTO: 89.6 FL (ref 80–99.9)
METER GLUCOSE: 121 MG/DL (ref 74–99)
METER GLUCOSE: 121 MG/DL (ref 74–99)
METER GLUCOSE: 123 MG/DL (ref 74–99)
METER GLUCOSE: 144 MG/DL (ref 74–99)
METER GLUCOSE: 157 MG/DL (ref 74–99)
MONOCYTES ABSOLUTE: 0.89 E9/L (ref 0.1–0.95)
MONOCYTES RELATIVE PERCENT: 9.9 % (ref 2–12)
NEUTROPHILS ABSOLUTE: 6.58 E9/L (ref 1.8–7.3)
NEUTROPHILS RELATIVE PERCENT: 73.1 % (ref 43–80)
PDW BLD-RTO: 15.6 FL (ref 11.5–15)
PLATELET # BLD: 257 E9/L (ref 130–450)
PMV BLD AUTO: 10.5 FL (ref 7–12)
POTASSIUM SERPL-SCNC: 4.7 MMOL/L (ref 3.5–5)
RBC # BLD: 3.26 E12/L (ref 3.5–5.5)
REASON FOR REJECTION: NORMAL
REJECTED TEST: NORMAL
SODIUM BLD-SCNC: 137 MMOL/L (ref 132–146)
TOTAL PROTEIN: 6.8 G/DL (ref 6.4–8.3)
WBC # BLD: 9 E9/L (ref 4.5–11.5)

## 2019-07-07 PROCEDURE — 2500000003 HC RX 250 WO HCPCS: Performed by: NURSE ANESTHETIST, CERTIFIED REGISTERED

## 2019-07-07 PROCEDURE — 2720000010 HC SURG SUPPLY STERILE: Performed by: ORTHOPAEDIC SURGERY

## 2019-07-07 PROCEDURE — 3700000001 HC ADD 15 MINUTES (ANESTHESIA): Performed by: ORTHOPAEDIC SURGERY

## 2019-07-07 PROCEDURE — C1713 ANCHOR/SCREW BN/BN,TIS/BN: HCPCS | Performed by: ORTHOPAEDIC SURGERY

## 2019-07-07 PROCEDURE — 7100000001 HC PACU RECOVERY - ADDTL 15 MIN: Performed by: ORTHOPAEDIC SURGERY

## 2019-07-07 PROCEDURE — 99231 SBSQ HOSP IP/OBS SF/LOW 25: CPT | Performed by: SURGERY

## 2019-07-07 PROCEDURE — 3600000005 HC SURGERY LEVEL 5 BASE: Performed by: ORTHOPAEDIC SURGERY

## 2019-07-07 PROCEDURE — 7100000000 HC PACU RECOVERY - FIRST 15 MIN: Performed by: ORTHOPAEDIC SURGERY

## 2019-07-07 PROCEDURE — 82962 GLUCOSE BLOOD TEST: CPT

## 2019-07-07 PROCEDURE — 2580000003 HC RX 258: Performed by: STUDENT IN AN ORGANIZED HEALTH CARE EDUCATION/TRAINING PROGRAM

## 2019-07-07 PROCEDURE — 99222 1ST HOSP IP/OBS MODERATE 55: CPT | Performed by: INTERNAL MEDICINE

## 2019-07-07 PROCEDURE — 85025 COMPLETE CBC W/AUTO DIFF WBC: CPT

## 2019-07-07 PROCEDURE — 80053 COMPREHEN METABOLIC PANEL: CPT

## 2019-07-07 PROCEDURE — 6370000000 HC RX 637 (ALT 250 FOR IP): Performed by: STUDENT IN AN ORGANIZED HEALTH CARE EDUCATION/TRAINING PROGRAM

## 2019-07-07 PROCEDURE — 2709999900 HC NON-CHARGEABLE SUPPLY: Performed by: ORTHOPAEDIC SURGERY

## 2019-07-07 PROCEDURE — 2500000003 HC RX 250 WO HCPCS: Performed by: ORTHOPAEDIC SURGERY

## 2019-07-07 PROCEDURE — 6360000002 HC RX W HCPCS: Performed by: STUDENT IN AN ORGANIZED HEALTH CARE EDUCATION/TRAINING PROGRAM

## 2019-07-07 PROCEDURE — 1200000000 HC SEMI PRIVATE

## 2019-07-07 PROCEDURE — 3209999900 FLUORO FOR SURGICAL PROCEDURES

## 2019-07-07 PROCEDURE — 93010 ELECTROCARDIOGRAM REPORT: CPT | Performed by: INTERNAL MEDICINE

## 2019-07-07 PROCEDURE — 0QS704Z REPOSITION LEFT UPPER FEMUR WITH INTERNAL FIXATION DEVICE, OPEN APPROACH: ICD-10-PCS | Performed by: ORTHOPAEDIC SURGERY

## 2019-07-07 PROCEDURE — 36415 COLL VENOUS BLD VENIPUNCTURE: CPT

## 2019-07-07 PROCEDURE — 86923 COMPATIBILITY TEST ELECTRIC: CPT

## 2019-07-07 PROCEDURE — 2580000003 HC RX 258: Performed by: NURSE ANESTHETIST, CERTIFIED REGISTERED

## 2019-07-07 PROCEDURE — 6360000002 HC RX W HCPCS: Performed by: NURSE ANESTHETIST, CERTIFIED REGISTERED

## 2019-07-07 PROCEDURE — 2580000003 HC RX 258: Performed by: INTERNAL MEDICINE

## 2019-07-07 PROCEDURE — 3600000015 HC SURGERY LEVEL 5 ADDTL 15MIN: Performed by: ORTHOPAEDIC SURGERY

## 2019-07-07 PROCEDURE — 3700000000 HC ANESTHESIA ATTENDED CARE: Performed by: ORTHOPAEDIC SURGERY

## 2019-07-07 PROCEDURE — C1769 GUIDE WIRE: HCPCS | Performed by: ORTHOPAEDIC SURGERY

## 2019-07-07 DEVICE — SCREW BNE L90MM DIA10.35MM PROX FEM G TI CANN FOR TFN ADV: Type: IMPLANTABLE DEVICE | Site: FEMUR | Status: FUNCTIONAL

## 2019-07-07 DEVICE — SCREW BNE L40MM DIA5MM ST TIB LT GRN TI ST CANN LOK FULL: Type: IMPLANTABLE DEVICE | Site: FEMUR | Status: FUNCTIONAL

## 2019-07-07 DEVICE — NAIL IM L360MM DIA11MM 130DEG LNG L PROX FEM GRN TI CANN: Type: IMPLANTABLE DEVICE | Site: FEMUR | Status: FUNCTIONAL

## 2019-07-07 RX ORDER — MORPHINE SULFATE 2 MG/ML
1 INJECTION, SOLUTION INTRAMUSCULAR; INTRAVENOUS EVERY 5 MIN PRN
Status: DISCONTINUED | OUTPATIENT
Start: 2019-07-07 | End: 2019-07-07 | Stop reason: HOSPADM

## 2019-07-07 RX ORDER — VERAPAMIL HYDROCHLORIDE 240 MG/1
240 TABLET, FILM COATED, EXTENDED RELEASE ORAL NIGHTLY
Status: DISCONTINUED | OUTPATIENT
Start: 2019-07-07 | End: 2019-07-07

## 2019-07-07 RX ORDER — OXYCODONE HYDROCHLORIDE AND ACETAMINOPHEN 5; 325 MG/1; MG/1
1 TABLET ORAL EVERY 4 HOURS PRN
Status: DISCONTINUED | OUTPATIENT
Start: 2019-07-07 | End: 2019-07-08

## 2019-07-07 RX ORDER — PROPOFOL 10 MG/ML
INJECTION, EMULSION INTRAVENOUS PRN
Status: DISCONTINUED | OUTPATIENT
Start: 2019-07-07 | End: 2019-07-07 | Stop reason: SDUPTHER

## 2019-07-07 RX ORDER — ROCURONIUM BROMIDE 10 MG/ML
INJECTION, SOLUTION INTRAVENOUS PRN
Status: DISCONTINUED | OUTPATIENT
Start: 2019-07-07 | End: 2019-07-07 | Stop reason: SDUPTHER

## 2019-07-07 RX ORDER — CEFAZOLIN SODIUM 1 G/3ML
INJECTION, POWDER, FOR SOLUTION INTRAMUSCULAR; INTRAVENOUS PRN
Status: DISCONTINUED | OUTPATIENT
Start: 2019-07-07 | End: 2019-07-07 | Stop reason: SDUPTHER

## 2019-07-07 RX ORDER — SODIUM CHLORIDE 0.9 % (FLUSH) 0.9 %
10 SYRINGE (ML) INJECTION PRN
Status: DISCONTINUED | OUTPATIENT
Start: 2019-07-07 | End: 2019-07-10 | Stop reason: HOSPADM

## 2019-07-07 RX ORDER — TRAMADOL HYDROCHLORIDE 50 MG/1
50 TABLET ORAL EVERY 6 HOURS
Status: DISCONTINUED | OUTPATIENT
Start: 2019-07-07 | End: 2019-07-08

## 2019-07-07 RX ORDER — GLYCOPYRROLATE 1 MG/5 ML
SYRINGE (ML) INTRAVENOUS PRN
Status: DISCONTINUED | OUTPATIENT
Start: 2019-07-07 | End: 2019-07-07 | Stop reason: SDUPTHER

## 2019-07-07 RX ORDER — FENTANYL CITRATE 50 UG/ML
INJECTION, SOLUTION INTRAMUSCULAR; INTRAVENOUS PRN
Status: DISCONTINUED | OUTPATIENT
Start: 2019-07-07 | End: 2019-07-07 | Stop reason: SDUPTHER

## 2019-07-07 RX ORDER — ACETAMINOPHEN 325 MG/1
650 TABLET ORAL EVERY 6 HOURS
Status: DISCONTINUED | OUTPATIENT
Start: 2019-07-07 | End: 2019-07-10 | Stop reason: HOSPADM

## 2019-07-07 RX ORDER — OXYCODONE HYDROCHLORIDE AND ACETAMINOPHEN 5; 325 MG/1; MG/1
2 TABLET ORAL EVERY 4 HOURS PRN
Status: DISCONTINUED | OUTPATIENT
Start: 2019-07-07 | End: 2019-07-08

## 2019-07-07 RX ORDER — OXYCODONE HYDROCHLORIDE AND ACETAMINOPHEN 5; 325 MG/1; MG/1
1 TABLET ORAL EVERY 6 HOURS PRN
Qty: 28 TABLET | Refills: 0 | Status: SHIPPED | OUTPATIENT
Start: 2019-07-07 | End: 2019-07-14

## 2019-07-07 RX ORDER — LOSARTAN POTASSIUM 50 MG/1
100 TABLET ORAL DAILY
Status: DISCONTINUED | OUTPATIENT
Start: 2019-07-07 | End: 2019-07-10 | Stop reason: HOSPADM

## 2019-07-07 RX ORDER — ONDANSETRON 2 MG/ML
INJECTION INTRAMUSCULAR; INTRAVENOUS PRN
Status: DISCONTINUED | OUTPATIENT
Start: 2019-07-07 | End: 2019-07-07 | Stop reason: SDUPTHER

## 2019-07-07 RX ORDER — ONDANSETRON 2 MG/ML
4 INJECTION INTRAMUSCULAR; INTRAVENOUS EVERY 6 HOURS PRN
Status: DISCONTINUED | OUTPATIENT
Start: 2019-07-07 | End: 2019-07-10 | Stop reason: HOSPADM

## 2019-07-07 RX ORDER — NEOSTIGMINE METHYLSULFATE 1 MG/ML
INJECTION, SOLUTION INTRAVENOUS PRN
Status: DISCONTINUED | OUTPATIENT
Start: 2019-07-07 | End: 2019-07-07 | Stop reason: SDUPTHER

## 2019-07-07 RX ORDER — SUCCINYLCHOLINE/SOD CL,ISO/PF 200MG/10ML
SYRINGE (ML) INTRAVENOUS PRN
Status: DISCONTINUED | OUTPATIENT
Start: 2019-07-07 | End: 2019-07-07 | Stop reason: SDUPTHER

## 2019-07-07 RX ORDER — SODIUM CHLORIDE 9 MG/ML
INJECTION, SOLUTION INTRAVENOUS CONTINUOUS PRN
Status: DISCONTINUED | OUTPATIENT
Start: 2019-07-07 | End: 2019-07-07 | Stop reason: SDUPTHER

## 2019-07-07 RX ORDER — SODIUM CHLORIDE 0.9 % (FLUSH) 0.9 %
10 SYRINGE (ML) INJECTION EVERY 12 HOURS SCHEDULED
Status: DISCONTINUED | OUTPATIENT
Start: 2019-07-07 | End: 2019-07-10 | Stop reason: HOSPADM

## 2019-07-07 RX ORDER — MORPHINE SULFATE 2 MG/ML
2 INJECTION, SOLUTION INTRAMUSCULAR; INTRAVENOUS EVERY 5 MIN PRN
Status: DISCONTINUED | OUTPATIENT
Start: 2019-07-07 | End: 2019-07-07 | Stop reason: HOSPADM

## 2019-07-07 RX ORDER — ASPIRIN 325 MG
325 TABLET, DELAYED RELEASE (ENTERIC COATED) ORAL 2 TIMES DAILY
Qty: 56 TABLET | Refills: 0 | Status: SHIPPED | OUTPATIENT
Start: 2019-07-07 | End: 2019-07-10 | Stop reason: HOSPADM

## 2019-07-07 RX ORDER — LIDOCAINE HYDROCHLORIDE 20 MG/ML
INJECTION, SOLUTION INTRAVENOUS PRN
Status: DISCONTINUED | OUTPATIENT
Start: 2019-07-07 | End: 2019-07-07 | Stop reason: SDUPTHER

## 2019-07-07 RX ADMIN — ACETAMINOPHEN 650 MG: 325 TABLET, FILM COATED ORAL at 23:04

## 2019-07-07 RX ADMIN — LIDOCAINE HYDROCHLORIDE 50 MG: 20 INJECTION, SOLUTION INTRAVENOUS at 14:59

## 2019-07-07 RX ADMIN — CEFAZOLIN 2000 MG: 1 INJECTION, POWDER, FOR SOLUTION INTRAMUSCULAR; INTRAVENOUS at 14:59

## 2019-07-07 RX ADMIN — TRAMADOL HYDROCHLORIDE 50 MG: 50 TABLET, FILM COATED ORAL at 17:43

## 2019-07-07 RX ADMIN — ROCURONIUM BROMIDE 20 MG: 10 INJECTION, SOLUTION INTRAVENOUS at 15:05

## 2019-07-07 RX ADMIN — FENTANYL CITRATE 50 MCG: 50 INJECTION, SOLUTION INTRAMUSCULAR; INTRAVENOUS at 15:15

## 2019-07-07 RX ADMIN — ONDANSETRON HYDROCHLORIDE 4 MG: 2 INJECTION, SOLUTION INTRAMUSCULAR; INTRAVENOUS at 15:54

## 2019-07-07 RX ADMIN — Medication 100 MG: at 14:58

## 2019-07-07 RX ADMIN — ROCURONIUM BROMIDE 5 MG: 10 INJECTION, SOLUTION INTRAVENOUS at 14:58

## 2019-07-07 RX ADMIN — ACETAMINOPHEN 650 MG: 325 TABLET, FILM COATED ORAL at 17:43

## 2019-07-07 RX ADMIN — TRAMADOL HYDROCHLORIDE 50 MG: 50 TABLET, FILM COATED ORAL at 23:04

## 2019-07-07 RX ADMIN — SODIUM CHLORIDE: 9 INJECTION, SOLUTION INTRAVENOUS at 14:45

## 2019-07-07 RX ADMIN — ENOXAPARIN SODIUM 30 MG: 30 INJECTION SUBCUTANEOUS at 17:43

## 2019-07-07 RX ADMIN — FENTANYL CITRATE 50 MCG: 50 INJECTION, SOLUTION INTRAMUSCULAR; INTRAVENOUS at 15:44

## 2019-07-07 RX ADMIN — Medication 0.6 MG: at 15:54

## 2019-07-07 RX ADMIN — CEFAZOLIN SODIUM 2 G: 10 INJECTION, POWDER, FOR SOLUTION INTRAVENOUS at 23:04

## 2019-07-07 RX ADMIN — MORPHINE SULFATE 4 MG: 4 INJECTION, SOLUTION INTRAMUSCULAR; INTRAVENOUS at 04:35

## 2019-07-07 RX ADMIN — PROPOFOL 110 MG: 10 INJECTION, EMULSION INTRAVENOUS at 14:58

## 2019-07-07 RX ADMIN — DEXTROSE AND SODIUM CHLORIDE: 5; 450 INJECTION, SOLUTION INTRAVENOUS at 00:11

## 2019-07-07 RX ADMIN — TRANEXAMIC ACID 500 MG: 1 INJECTION, SOLUTION INTRAVENOUS at 15:29

## 2019-07-07 RX ADMIN — Medication 3 MG: at 15:55

## 2019-07-07 RX ADMIN — FENTANYL CITRATE 50 MCG: 50 INJECTION, SOLUTION INTRAMUSCULAR; INTRAVENOUS at 14:58

## 2019-07-07 RX ADMIN — FENTANYL CITRATE 50 MCG: 50 INJECTION, SOLUTION INTRAMUSCULAR; INTRAVENOUS at 14:45

## 2019-07-07 RX ADMIN — FENTANYL CITRATE 50 MCG: 50 INJECTION, SOLUTION INTRAMUSCULAR; INTRAVENOUS at 16:07

## 2019-07-07 ASSESSMENT — PAIN SCALES - GENERAL
PAINLEVEL_OUTOF10: 0
PAINLEVEL_OUTOF10: 0
PAINLEVEL_OUTOF10: 7
PAINLEVEL_OUTOF10: 0
PAINLEVEL_OUTOF10: 10
PAINLEVEL_OUTOF10: 0
PAINLEVEL_OUTOF10: 0
PAINLEVEL_OUTOF10: 7
PAINLEVEL_OUTOF10: 10

## 2019-07-07 ASSESSMENT — PAIN DESCRIPTION - PROGRESSION
CLINICAL_PROGRESSION: NOT CHANGED

## 2019-07-07 ASSESSMENT — PULMONARY FUNCTION TESTS
PIF_VALUE: 24
PIF_VALUE: 0
PIF_VALUE: 24
PIF_VALUE: 7
PIF_VALUE: 24
PIF_VALUE: 24
PIF_VALUE: 1
PIF_VALUE: 0
PIF_VALUE: 24
PIF_VALUE: 0
PIF_VALUE: 24
PIF_VALUE: 4
PIF_VALUE: 24
PIF_VALUE: 0
PIF_VALUE: 24
PIF_VALUE: 15
PIF_VALUE: 24
PIF_VALUE: 9
PIF_VALUE: 24
PIF_VALUE: 0
PIF_VALUE: 24
PIF_VALUE: 9
PIF_VALUE: 0
PIF_VALUE: 0
PIF_VALUE: 24
PIF_VALUE: 24
PIF_VALUE: 0
PIF_VALUE: 0
PIF_VALUE: 24
PIF_VALUE: 0
PIF_VALUE: 24
PIF_VALUE: 23
PIF_VALUE: 24
PIF_VALUE: 0
PIF_VALUE: 24
PIF_VALUE: 31
PIF_VALUE: 24
PIF_VALUE: 3
PIF_VALUE: 0
PIF_VALUE: 24
PIF_VALUE: 1
PIF_VALUE: 24
PIF_VALUE: 24
PIF_VALUE: 7
PIF_VALUE: 24

## 2019-07-07 ASSESSMENT — PAIN DESCRIPTION - LOCATION
LOCATION: HIP

## 2019-07-07 ASSESSMENT — PAIN DESCRIPTION - ORIENTATION
ORIENTATION: LEFT

## 2019-07-07 ASSESSMENT — PAIN DESCRIPTION - DESCRIPTORS
DESCRIPTORS: ACHING;CONSTANT;DULL
DESCRIPTORS: ACHING;CONSTANT;SHARP
DESCRIPTORS: ACHING;CONSTANT;DISCOMFORT

## 2019-07-07 ASSESSMENT — PAIN DESCRIPTION - FREQUENCY
FREQUENCY: CONTINUOUS

## 2019-07-07 ASSESSMENT — PAIN DESCRIPTION - ONSET
ONSET: ON-GOING

## 2019-07-07 ASSESSMENT — PAIN - FUNCTIONAL ASSESSMENT
PAIN_FUNCTIONAL_ASSESSMENT: PREVENTS OR INTERFERES WITH MANY ACTIVE NOT PASSIVE ACTIVITIES
PAIN_FUNCTIONAL_ASSESSMENT: PREVENTS OR INTERFERES SOME ACTIVE ACTIVITIES AND ADLS

## 2019-07-07 ASSESSMENT — PAIN DESCRIPTION - PAIN TYPE
TYPE: ACUTE PAIN;SURGICAL PAIN
TYPE: ACUTE PAIN;SURGICAL PAIN
TYPE: ACUTE PAIN

## 2019-07-07 NOTE — ANESTHESIA PRE PROCEDURE
Department of Anesthesiology  Preprocedure Note       Name:  Bhavin Morgan   Age:  81 y.o.  :  1935                                          MRN:  [id number]         Date:  2019      Surgeon: Katherine Abraham):  Clarence Cisse MD    Procedure: HIP OPEN REDUCTION INTERNAL FIXATION (Left )    Medications prior to admission:   Prior to Admission medications    Medication Sig Start Date End Date Taking? Authorizing Provider   senna (SENOKOT) 8.6 MG tablet Take 1 tablet by mouth 2 times daily 19  Yes Lilia Garcia MD   verapamil (CALAN SR) 240 MG extended release tablet Take 1 tablet by mouth nightly 19  Yes Leartis Nyhan, MD   losartan (COZAAR) 100 MG tablet Take 1 tablet by mouth daily 19  Yes Leartis Nyhan, MD   hydrALAZINE (APRESOLINE) 100 MG tablet Take 1 tablet by mouth 3 times daily 19  Yes Leartis Nyhan, MD   ferrous sulfate (FE TABS) 325 (65 Fe) MG EC tablet Take 1 tablet by mouth daily (with breakfast) 19  Yes Elda Lawrence MD   aspirin (GNP ASPIRIN LOW DOSE) 81 MG EC tablet TAKE 1 TABLET BY MOUTH EVERY DAY 19  Yes Elda Lawrence MD   Calcium Carb-Cholecalciferol 600-500 MG-UNIT CAPS Take 1 tablet by mouth 3 times daily otc   Yes Historical Provider, MD   acetaminophen (TYLENOL 8 HOUR ARTHRITIS PAIN) 650 MG extended release tablet Take 650 mg by mouth every 8 hours as needed for Pain   Yes Historical Provider, MD   naproxen-diphenhydramine 220-25 MG TABS Take 1 tablet by mouth nightly    Historical Provider, MD   glucose blood VI test strips (ASCENSIA AUTODISC VI;ONE TOUCH ULTRA TEST VI) strip Freestyle glucose strips. May change to match her glucometer. Check blood sugars once a day.  10/26/16   Epifanio Shah MD   [employer] Ultra Thin Lancets MISC Monitor blood sugar once a day 10/26/16   Epifanio Shah MD   glucose monitoring kit (FREESTYLE) monitoring kit 1 kit by Does not apply route daily 10/26/16   Epifanio Shah MD   LUMIGAN 0.01 % SOLN ophthalmic drops PLACE 1 GTT INTO OU QHS 6/22/16   Historical Provider, MD       Current medications:    Current Facility-Administered Medications   Medication Dose Route Frequency Provider Last Rate Last Dose    ceFAZolin (ANCEF) 2 g in dextrose 5 % 50 mL IVPB  2 g Intravenous See Admin Instructions Malou Barcenas DO        losartan (COZAAR) tablet 100 mg  100 mg Oral Daily Seth Trimble MD        verapamil (CALAN SR) extended release tablet 240 mg  240 mg Oral Nightly Seth Trimble MD        morphine sulfate (PF) injection 4 mg  4 mg Intravenous Q3H PRN Malou Barcenas DO   4 mg at 07/07/19 0435    HYDROcodone-acetaminophen (NORCO) 5-325 MG per tablet 1 tablet  1 tablet Oral Q6H PRN Malou Barcenas DO        aspirin EC tablet 81 mg  81 mg Oral Daily Seth Trimble MD        ferrous sulfate tablet 325 mg  325 mg Oral Daily with breakfast Seth Trimble MD        [facility]) tablet 8.6 mg  1 tablet Oral BID Seth Trimble MD        dextrose 5 % and 0.45 % sodium chloride infusion   Intravenous Continuous Seth Trimble  mL/hr at 07/07/19 0011      glucose (GLUTOSE) 40 % oral gel 15 g  15 g Oral PRN Seth Trimble MD        dextrose 50 % IV solution  12.5 g Intravenous PRN Seth Trimble MD        glucagon (rDNA) injection 1 mg  1 mg Intramuscular PRN Seth Trimble MD        dextrose 5 % solution  100 mL/hr Intravenous PRN Seth Trimble MD        insulin lispro (HUMALOG) injection vial 0-6 Units  0-6 Units Subcutaneous TID WC Seth Trimble MD        insulin lispro (HUMALOG) injection vial 0-3 Units  0-3 Units Subcutaneous Nightly Seth Trimble MD           Allergies:  No Known Allergies    Problem List:    Patient Active Problem List   Diagnosis Code    HTN (hypertension) I10    Osteoporosis M81.0    Chronic obstructive pulmonary disease ([facility]) J44.9    Glaucoma H40.9    Fracture of hip, R, comminuted S72.009A    PVD (peripheral vascular disease) ([facility]) I73.9    History of tobacco abuse Z87.891    Stage 3 chronic kidney BP Readings from Last 3 Encounters:   07/07/19 (!) 121/58   06/26/19 (!) 147/62   06/25/19 (!) 172/84       NPO Status: Time of last liquid consumption: 2000                        Time of last solid consumption: 2000                        Date of last liquid consumption: 07/06/19                        Date of last solid food consumption: 07/06/19    BMI:   Wt Readings from Last 3 Encounters:   07/06/19 118 lb (53.5 kg)   06/25/19 118 lb (53.5 kg)   06/25/19 119 lb (54 kg)     Body mass index is 20.9 kg/m². CBC:   Lab Results   Component Value Date    WBC 9.0 07/07/2019    RBC 3.26 07/07/2019    HGB 8.9 07/07/2019    HCT 29.2 07/07/2019    MCV 89.6 07/07/2019    RDW 15.6 07/07/2019     07/07/2019       CMP:   Lab Results   Component Value Date     07/07/2019    K 4.7 07/07/2019    K 4.1 06/26/2019     07/07/2019    CO2 24 07/07/2019    BUN 15 07/07/2019    CREATININE 1.2 07/07/2019    GFRAA 52 07/07/2019    LABGLOM 52 07/07/2019    GLUCOSE 148 07/07/2019    GLUCOSE 121 11/29/2011    PROT 6.8 07/07/2019    CALCIUM 9.2 07/07/2019    BILITOT 0.2 07/07/2019    ALKPHOS 60 07/07/2019    AST 29 07/07/2019    ALT 11 07/07/2019       POC Tests: No results for input(s): POCGLU, POCNA, POCK, POCCL, POCBUN, POCHEMO, POCHCT in the last 72 hours.     Coags:   Lab Results   Component Value Date    PROTIME 12.5 07/06/2019    PROTIME 11.4 11/28/2011    INR 1.1 07/06/2019    APTT 25.0 07/06/2019       HCG (If Applicable): No results found for: PREGTESTUR, PREGSERUM, HCG, HCGQUANT     ABGs: No results found for: PHART, PO2ART, NCH0FOF, TVL5SDL, BEART, G1TZDIVX     Type & Screen (If Applicable):  No results found for: LABABO, [address]    Anesthesia Evaluation  Patient summary reviewed and Nursing notes reviewed  Airway: Mallampati: III  TM distance: >3 FB   Neck ROM: full  Mouth opening: > = 3 FB Dental:      Comment: None loose    Pulmonary: breath sounds clear to auscultation  (+) COPD: Cardiovascular:    (+) hypertension:,         Rhythm: regular  Rate: normal                 ROS comment: EF 65%  Mild to Mod MR     Neuro/Psych:   Negative Neuro/Psych ROS              GI/Hepatic/Renal:            ROS comment: Anemia. Endo/Other:    (+) DiabetesType II DM, using insulin, . ROS comment:   Lt. Hip fx Abdominal:           Vascular: negative vascular ROS. Anesthesia Plan      general     ASA 3             Anesthetic plan and risks discussed with patient. Use of blood products discussed with patient whom consented to blood products. Plan discussed with CRNA. Lois Byrd DO   7/7/2019    Pt. Examined, chart reviewed. Pt. Is mildly confused. No family present to speak with. Will proceed with GA.     Lois Byrd DO  July 7, 2019  1:44 PM

## 2019-07-07 NOTE — PROGRESS NOTES
Resident requested via perfect serve to have hearn flushed for patency. Hearn flushed with 10ml of sterile water no no resistance. Resident notified.

## 2019-07-07 NOTE — PROGRESS NOTES
[facility]  Internal Medicine Residency Program  Progress Note - House Team 1    Patient:  Noelle Lee 81 y.o. female MRN: [id number]     Date of Service: 7/7/2019     CC: L hip pain s/p fall  Overnight events: none    Subjective     Patient seen and examined in bed this morning in no acute distress. Presently with mild L hip pain 3/10. No other acute complaints. Will be taken for surgery today. Objective     Physical Exam:  · Vitals: BP (!) 121/58   Pulse 84   Temp 98.9 °F (37.2 °C) (Temporal)   Resp 16   Ht 5' 3\" (1.6 m)   Wt 118 lb (53.5 kg)   SpO2 93%   Breastfeeding? No   BMI 20.90 kg/m²     · I & O - 24hr: No intake/output data recorded. · General Appearance: alert, appears stated age and cooperative  · HEENT:  Head: Normocephalic, no lesions, without obvious abnormality.   · Neck: no adenopathy, no JVD and supple, symmetrical, trachea midline  · Lung: clear to auscultation bilaterally  · Heart: regular rate and rhythm, S1, S2 normal, no murmur, click, rub or gallop, regular rate and rhythm, no S3 or S4 and systolic murmur: holosystolic 2/6, blowing at 2nd left intercostal space, at lower left sternal border, at apex  · Abdomen: soft, non-tender; bowel sounds normal; no masses,  no organomegaly  · Extremities:  L leg internally rotated  · Musculokeletal: hip ROM limited due to pain  · Neurologic: Mental status: Alert, oriented, thought content appropriate  Subject  Pertinent Labs & Imaging Studies   rohit  CBC:   Lab Results   Component Value Date    WBC 9.0 07/07/2019    RBC 3.26 07/07/2019    HGB 8.9 07/07/2019    HCT 29.2 07/07/2019    MCV 89.6 07/07/2019    MCH 27.3 07/07/2019    MCHC 30.5 07/07/2019    RDW 15.6 07/07/2019     07/07/2019    MPV 10.5 07/07/2019     CMP:    Lab Results   Component Value Date     07/07/2019    K 4.7 07/07/2019    K 4.1 06/26/2019     07/07/2019    CO2 24 07/07/2019    BUN 15 07/07/2019    CREATININE 1.2 07/07/2019 plan and orders as documented by the intern.      Left hip intertrochanteric fracture   - Plan for ORIF today     Chronic normocytic anemia likely 2/2 blood loss from fx  - Hb at her baseline 9s  - General surgery no intervention  - F/u CBC post op tmw am    Remainder of medical problems as per intern note above    Beth Reese MD, PGY 3  Attending physician: Dr. Prateek Gallegos

## 2019-07-07 NOTE — H&P
appetite  · HEENT: No blurred vision, no ear problems, no sore throat, no rhinorrhea. · Respiratory: No cough, no sputum production, no pleuritic chest pain, no shortness of breath  · Cardiology: No angina, no dyspnea on exertion, no paroxysmal nocturnal dyspnea, no orthopnea, no palpitation, no leg swelling. · Gastroenterology: No dysphagia, no reflux; no abdominal pain, no nausea or vomiting; no constipation or diarrhea. No hematochezia   · Genitourinary: No dysuria, no frequency, hesitancy; no hematuria  · Musculoskeletal: left hip pain with restricted mobility  · Neurology: no focal weakness in extremities, no slurred speech, no double vision, no tingling or numbness sensation  · Endocrinology: no temperature intolerance, no polyphagia, polydipsia or polyuria  · Hematology: no increased bleeding, no bruising, no lymphadenopathy  · Skin: no skin changes noticed by patient  · Psychology: no depressed mood, no suicidal ideation    Physical Exam   · Vitals: BP (!) 110/55   Pulse 78   Temp 98.1 °F (36.7 °C) (Temporal)   Resp 16   Ht 5' 3\" (1.6 m)   Wt 118 lb (53.5 kg)   SpO2 97%   Breastfeeding?  No   BMI 20.90 kg/m²     General Appearance: alert and oriented to person, place and time  Skin: warm and dry, no rash or erythema  Head: normocephalic and atraumatic, dry mucous membrane and dry lips  Eyes: pupils equal, round, and reactive to light  Neck: neck supple and non tender without mass, no thyromegaly or thyroid nodules, no cervical lymphadenopathy   Pulmonary/Chest: Clear to auscultation, no rhonchi  Cardiovascular: normal rate, normal S1 and S2, no gallops, intact distal pulses and no carotid bruits  Abdomen: soft, non-tender, non-distended, normal bowel sounds, no masses or organomegaly  Extremities: no pedal edema, left leg internally rotated and swollen  Musculoskeletal: restricted range of motion of the left hip joint due to pain    Labs and Imaging Studies   Basic Labs  Recent Labs left knee joint. Vascular arterial calcification observed in the popliteal region. Generalized osteopenia. No joint effusion. No acute displaced fractures identified in the left knee joint. Ct Abdomen Pelvis W Iv Contrast Additional Contrast? None    Result Date: 2019  Patient MRN:  [id number] : 1935 Age: 81 years Gender: Female Order Date:  2019 5:45 PM EXAM: CT ABDOMEN PELVIS W IV CONTRAST NUMBER OF IMAGES \ views:  304 INDICATION: Abdominal pain and emesis of recent onset COMPARISON: Previous noncontrast abdomen and pelvis CT study 2017 TECHNIQUE: Axial computerized tomography sections of the abdomen with sagittal and coronal MPR reconstructions were obtained from the lower chest to the pelvic floor in conjunction with the intravenous administration of 110 mL of Isovue-370. Low-dose CT  acquisition technique included one of following options; 1 . Automated exposure control, 2. Adjustment of MA and or KV according to patient's size or 3. Use of iterative reconstruction. FINDINGS: CHEST: The lung bases are clear, with no evidence of acute cardiopulmonary or pleural pathology. Cardiomegaly persists, and there appears to be a small retrocardiac hiatus hernia. There is dense descending aortic intimal calcification. LIVER: The liver is uniform in parenchymal density, and no focal lesions are identified. GALLBLADDER AND BILIARY TREE: The gallbladder is surgically absent, and there is no biliary ductal ectasia. SPLEEN:The spleen is not enlarged, and shows no focal pathology. ADRENALS:  The adrenals are normal in appearance bilaterally. PANCREAS:The pancreas shows no evidence of acute inflammation or mass lesions. There appears to be a degree of pancreatic atrophy. KIDNEYS/BLADDER: The kidneys show uniform cortical enhancement and no evidence of outflow obstruction. A 15 mm diameter lateral exophytic cortical cyst of the right kidney is benign.  The degree of age-related cortical atrophy is Diet  Disposition: Medical floor    Sarai Murphy MD, PGY-1   Attending physician: Dr. Nando Iglesias    Senior Resident Statement  I have seen and examined the patient with the intern. I have discussed the case, including pertinent history and exam findings with the intern. I agree with the assessment, plan and orders as documented by the intern. I have also discussed the plan with the attending on call, Dr. Celia Hanson of medical problems as per intern note above.       Nisreen Perry M.D., PGY 2    Attending physician: Dr. Nando Iglesias

## 2019-07-07 NOTE — CONSULTS
Department of Orthopedic Surgery  Resident Consult Note          Reason for Consult: Left hip pain    HISTORY OF PRESENT ILLNESS:       Patient is a 81 y.o. female who presents with left hip pain status post mechanical fall. Patient states that she stepped off of a curb, lost her balance and fell onto her left side. Denies any head trauma or loss of consciousness. Ambulated without assistive device. Independent to all ADLs. Takes daily aspirin. Denies injury to other extremities. Denies numbness/tingling/paresthesias. Denies any other orthopedic complaints at this time. Past Medical History:        Diagnosis Date    Anxiety     Arthritis     [facility] obstructive [facility] [address] [facility]     Diabetes mellitus ([facility] [facility]     Hypertension     Osteoarthritis      Past Surgical History:        Procedure Laterality Date    APPENDECTOMY      CHOLECYSTECTOMY      EYE SURGERY       Current Medications:   Current Facility-Administered Medications: fentaNYL (SUBLIMAZE) injection 50 mcg, 50 mcg, Intravenous, Once  morphine sulfate (PF) injection 4 mg, 4 mg, Intravenous, Q3H PRN  HYDROcodone-acetaminophen (NORCO) 5-325 MG per tablet 1 tablet, 1 tablet, Oral, Q6H PRN  Allergies:  Patient has no known allergies. Social History:   TOBACCO:   reports that she quit smoking about 5 years ago. Her smoking use included cigars. She has a 5.00 pack-year smoking history. She has never used smokeless tobacco.  ETOH:   reports that she does not drink alcohol. DRUGS:   reports that she does not use drugs.   ACTIVITIES OF DAILY LIVING:    OCCUPATION:    Family History:       Problem Relation Age of Onset    Arthritis Sister     Arthritis Maternal Aunt        REVIEW OF SYSTEMS:  CONSTITUTIONAL:  negative for  fevers, chills  EYES:  negative for blurred vision, visual disturbance  HEENT:  negative for  hearing loss, voice change  RESPIRATORY:  negative for  dyspnea, wheezing  CARDIOVASCULAR:  negative for  chest pain,
MD Gael   hydrALAZINE (APRESOLINE) 100 MG tablet Take 1 tablet by mouth 3 times daily 19  Yes Tasia Diehl MD   ferrous sulfate (FE TABS) 325 (65 Fe) MG EC tablet Take 1 tablet by mouth daily (with breakfast) 19  Yes Valerie Lancaster MD   aspirin (GNP ASPIRIN LOW DOSE) 81 MG EC tablet TAKE 1 TABLET BY MOUTH EVERY DAY 19  Yes Valerie Lancaster MD   Calcium Carb-Cholecalciferol 600-500 MG-UNIT CAPS Take 1 tablet by mouth 3 times daily otc   Yes Historical Provider, MD   acetaminophen (TYLENOL 8 HOUR ARTHRITIS PAIN) 650 MG extended release tablet Take 650 mg by mouth every 8 hours as needed for Pain   Yes Historical Provider, MD   naproxen-diphenhydramine 220-25 MG TABS Take 1 tablet by mouth nightly    Historical Provider, MD   glucose blood VI test strips (ASCENSIA AUTODISC VI;ONE TOUCH ULTRA TEST VI) strip Freestyle glucose strips. May change to match her glucometer. Check blood sugars once a day. 10/26/16   Mariola Nelson MD   [employer] Ultra Thin Lancets MISC Monitor blood sugar once a day 10/26/16   Mariola Nelson MD   glucose monitoring kit (FREESTYLE) monitoring kit 1 kit by Does not apply route daily 10/26/16   Mariola Nelson MD   LUMIGAN 0.01 % SOLN ophthalmic drops PLACE 1 GTT INTO OU QHS 16   Historical Provider, MD       No Known Allergies    Family History   Problem Relation Age of Onset    Arthritis Sister     Arthritis Maternal Aunt        Social History     Tobacco Use    Smoking status: Former Smoker     Packs/day: 0.50     Years: 10.00     Pack years: 5.00     Types: Cigars     Last attempt to quit: 2014     Years since quittin.5    Smokeless tobacco: Never Used   Substance Use Topics    Alcohol use: No     Alcohol/week: 3.5 oz     Types: 7 Standard drinks or equivalent per week     Comment: 4-15-13 states she hasnt had anything to drink for a couple of months.     Drug use: No         Review of Systems   General ROS:
07/06/2019         Radiology Review:  X-ray left hip: Comminuted intertrochanteric fracture. Three-part fracture involving the greater trochanter as well as a lesser troch fragment. Fracture is externally rotated and shortened and varus angulation      ASSESSMENT:    81y.o. year-old female with left intertrochanteric hip fracture      PLAN:  I had a long discussion with the patient regarding displaced intertrochanteric hip fractures. Operative management was discussed. The risks and benefits of surgery were discussed and all questions were answered. She would like to proceed with surgery. - Medical clearance  - NPO   - IVF  - NWB LLE  - Pain control  - Plan for IMN fixation left hip fracture     I spent over 50 minutes reviewing the EMR, radiographs, examining the patient, and discussing the injury and treatment plan with the patient.

## 2019-07-08 LAB
ALBUMIN SERPL-MCNC: 2.9 G/DL (ref 3.5–5.2)
ALP BLD-CCNC: 58 U/L (ref 35–104)
ALT SERPL-CCNC: 11 U/L (ref 0–32)
ANION GAP SERPL CALCULATED.3IONS-SCNC: 14 MMOL/L (ref 7–16)
AST SERPL-CCNC: 33 U/L (ref 0–31)
BILIRUB SERPL-MCNC: <0.2 MG/DL (ref 0–1.2)
BUN BLDV-MCNC: 16 MG/DL (ref 8–23)
CALCIUM SERPL-MCNC: 9.2 MG/DL (ref 8.6–10.2)
CHLORIDE BLD-SCNC: 105 MMOL/L (ref 98–107)
CO2: 21 MMOL/L (ref 22–29)
CREAT SERPL-MCNC: 1.4 MG/DL (ref 0.5–1)
GFR AFRICAN AMERICAN: 43
GFR NON-AFRICAN AMERICAN: 43 ML/MIN/1.73
GLUCOSE BLD-MCNC: 126 MG/DL (ref 74–99)
HCT VFR BLD CALC: 23.3 % (ref 34–48)
HEMOGLOBIN: 6.9 G/DL (ref 11.5–15.5)
MCH RBC QN AUTO: 27.3 PG (ref 26–35)
MCHC RBC AUTO-ENTMCNC: 29.6 % (ref 32–34.5)
MCV RBC AUTO: 92.1 FL (ref 80–99.9)
METER GLUCOSE: 105 MG/DL (ref 74–99)
METER GLUCOSE: 106 MG/DL (ref 74–99)
METER GLUCOSE: 113 MG/DL (ref 74–99)
PDW BLD-RTO: 15.5 FL (ref 11.5–15)
PLATELET # BLD: 213 E9/L (ref 130–450)
PMV BLD AUTO: 10.6 FL (ref 7–12)
POTASSIUM REFLEX MAGNESIUM: 4.8 MMOL/L (ref 3.5–5)
RBC # BLD: 2.53 E12/L (ref 3.5–5.5)
SODIUM BLD-SCNC: 140 MMOL/L (ref 132–146)
TOTAL PROTEIN: 6.2 G/DL (ref 6.4–8.3)
WBC # BLD: 11.2 E9/L (ref 4.5–11.5)

## 2019-07-08 PROCEDURE — 36430 TRANSFUSION BLD/BLD COMPNT: CPT

## 2019-07-08 PROCEDURE — 80053 COMPREHEN METABOLIC PANEL: CPT

## 2019-07-08 PROCEDURE — 2580000003 HC RX 258: Performed by: INTERNAL MEDICINE

## 2019-07-08 PROCEDURE — 6370000000 HC RX 637 (ALT 250 FOR IP): Performed by: INTERNAL MEDICINE

## 2019-07-08 PROCEDURE — P9016 RBC LEUKOCYTES REDUCED: HCPCS

## 2019-07-08 PROCEDURE — 1200000000 HC SEMI PRIVATE

## 2019-07-08 PROCEDURE — 82962 GLUCOSE BLOOD TEST: CPT

## 2019-07-08 PROCEDURE — 85027 COMPLETE CBC AUTOMATED: CPT

## 2019-07-08 PROCEDURE — 36415 COLL VENOUS BLD VENIPUNCTURE: CPT

## 2019-07-08 PROCEDURE — 2580000003 HC RX 258: Performed by: STUDENT IN AN ORGANIZED HEALTH CARE EDUCATION/TRAINING PROGRAM

## 2019-07-08 PROCEDURE — 6360000002 HC RX W HCPCS: Performed by: STUDENT IN AN ORGANIZED HEALTH CARE EDUCATION/TRAINING PROGRAM

## 2019-07-08 PROCEDURE — 6370000000 HC RX 637 (ALT 250 FOR IP): Performed by: STUDENT IN AN ORGANIZED HEALTH CARE EDUCATION/TRAINING PROGRAM

## 2019-07-08 PROCEDURE — 87040 BLOOD CULTURE FOR BACTERIA: CPT

## 2019-07-08 RX ORDER — SENNA PLUS 8.6 MG/1
1 TABLET ORAL 2 TIMES DAILY
Status: DISCONTINUED | OUTPATIENT
Start: 2019-07-08 | End: 2019-07-10 | Stop reason: HOSPADM

## 2019-07-08 RX ORDER — 0.9 % SODIUM CHLORIDE 0.9 %
250 INTRAVENOUS SOLUTION INTRAVENOUS ONCE
Status: COMPLETED | OUTPATIENT
Start: 2019-07-08 | End: 2019-07-08

## 2019-07-08 RX ORDER — SODIUM CHLORIDE 9 MG/ML
INJECTION, SOLUTION INTRAVENOUS CONTINUOUS
Status: DISCONTINUED | OUTPATIENT
Start: 2019-07-08 | End: 2019-07-09

## 2019-07-08 RX ORDER — POLYVINYL ALCOHOL 14 MG/ML
SOLUTION/ DROPS OPHTHALMIC
Status: DISCONTINUED | OUTPATIENT
Start: 2019-07-08 | End: 2019-07-10 | Stop reason: HOSPADM

## 2019-07-08 RX ORDER — LATANOPROST 50 UG/ML
1 SOLUTION/ DROPS OPHTHALMIC NIGHTLY
Status: DISCONTINUED | OUTPATIENT
Start: 2019-07-08 | End: 2019-07-10 | Stop reason: HOSPADM

## 2019-07-08 RX ADMIN — MAGNESIUM HYDROXIDE 30 ML: 400 SUSPENSION ORAL at 13:09

## 2019-07-08 RX ADMIN — ACETAMINOPHEN 650 MG: 325 TABLET, FILM COATED ORAL at 17:23

## 2019-07-08 RX ADMIN — ENOXAPARIN SODIUM 30 MG: 30 INJECTION SUBCUTANEOUS at 10:01

## 2019-07-08 RX ADMIN — SENNOSIDES 8.6 MG: 8.6 TABLET, FILM COATED ORAL at 20:34

## 2019-07-08 RX ADMIN — SODIUM CHLORIDE 250 ML: 9 INJECTION, SOLUTION INTRAVENOUS at 10:14

## 2019-07-08 RX ADMIN — SODIUM CHLORIDE: 9 INJECTION, SOLUTION INTRAVENOUS at 14:11

## 2019-07-08 RX ADMIN — FERROUS SULFATE TAB 325 MG (65 MG ELEMENTAL FE) 325 MG: 325 (65 FE) TAB at 10:02

## 2019-07-08 RX ADMIN — ONDANSETRON 4 MG: 2 INJECTION INTRAMUSCULAR; INTRAVENOUS at 10:30

## 2019-07-08 RX ADMIN — Medication 10 ML: at 10:07

## 2019-07-08 RX ADMIN — ACETAMINOPHEN 650 MG: 325 TABLET, FILM COATED ORAL at 10:01

## 2019-07-08 ASSESSMENT — PAIN DESCRIPTION - PROGRESSION
CLINICAL_PROGRESSION: NOT CHANGED
CLINICAL_PROGRESSION: NOT CHANGED

## 2019-07-08 ASSESSMENT — PAIN SCALES - GENERAL
PAINLEVEL_OUTOF10: 0
PAINLEVEL_OUTOF10: 8
PAINLEVEL_OUTOF10: 6
PAINLEVEL_OUTOF10: 10
PAINLEVEL_OUTOF10: 0

## 2019-07-08 ASSESSMENT — PAIN DESCRIPTION - ONSET
ONSET: ON-GOING
ONSET: ON-GOING

## 2019-07-08 ASSESSMENT — PAIN DESCRIPTION - FREQUENCY
FREQUENCY: CONTINUOUS
FREQUENCY: CONTINUOUS

## 2019-07-08 ASSESSMENT — PAIN - FUNCTIONAL ASSESSMENT
PAIN_FUNCTIONAL_ASSESSMENT: PREVENTS OR INTERFERES SOME ACTIVE ACTIVITIES AND ADLS
PAIN_FUNCTIONAL_ASSESSMENT: PREVENTS OR INTERFERES SOME ACTIVE ACTIVITIES AND ADLS

## 2019-07-08 ASSESSMENT — PAIN DESCRIPTION - LOCATION
LOCATION: HIP
LOCATION: HIP

## 2019-07-08 ASSESSMENT — PAIN DESCRIPTION - PAIN TYPE
TYPE: ACUTE PAIN;SURGICAL PAIN
TYPE: ACUTE PAIN;SURGICAL PAIN

## 2019-07-08 ASSESSMENT — PAIN DESCRIPTION - ORIENTATION
ORIENTATION: LEFT
ORIENTATION: LEFT

## 2019-07-08 ASSESSMENT — PAIN DESCRIPTION - DESCRIPTORS
DESCRIPTORS: ACHING;CONSTANT;DISCOMFORT
DESCRIPTORS: ACHING;CONSTANT;DISCOMFORT

## 2019-07-08 NOTE — PROGRESS NOTES
refusing all medication  - stop percocet, tramadol   2. Post operative antibiotics 24 hrs - cefaozlin 2g   3. Discontinue oral ferrous sulfate 325 mg daily   4. Low dose sliding scale insulin   5. HTN medications held- verapamil (consider substitute), losartan, hydralazine   6. PT/OT following   7. Senna 2 tabs daily ordered   8. Tylenol 650 mg every 6 hours PRN  9. Trend H&H   10. Stop lovenox  11.  Add Latanoprost 1 drop nightly     Nely Apple, MS-3  Attending physician: Dr. Drew Ornelas

## 2019-07-08 NOTE — PROGRESS NOTES
[facility]  Internal Medicine Residency Program  Progress Note - House Team 1     Patient:  Destini Jain 81 y.o. female MRN: [id number]     Date of Service: 7/8/2019                 CC: L hip pain s/p fall  Overnight events: Hb drop to 6.9 from 8.9 yesterday     Subjective      Patient seen and examined in bed this morning in no acute distress. Surgery yesterday without complications. States that she wants her aspirin and to go home today. Presently with moderate L hip pain, Percocet and Tylenol prn per ortho. Currently complaining of nausea without vomiting, decreased appetite due to nausea. Reports that she has not had a bowel movement since admission, but denies history of bloody or black stools. Denies chest pain, SOB, fatigue.       Objective      Physical Exam:  Vitals:   Vitals:    07/08/19 0830   BP: (!) 92/55   Pulse: 99   Resp: 16   Temp: 100.8 °F (38.2 °C)   SpO2: 92%   ·    · I & O - 24hr: No intake/output data recorded. · General Appearance: alert, appears stated age and cooperative  · HEENT:  Head: Normocephalic, no lesions, without obvious abnormality.   · Neck: no adenopathy, no JVD and supple, symmetrical, trachea midline  · Lung: clear to auscultation bilaterally  · Heart: regular rate and rhythm, S1, S2 normal, no click, rub or gallop, regular rate and rhythm, no S3 or S4 and systolic murmur: holosystolic 2/6, blowing, throughout precordium  · Abdomen: soft, diffuse tenderness to palpation; bowel sounds hypoactive; no masses,  no organomegaly  · Extremities:  L hip with surgical dressings, no ecchymosis or   · Musculokeletal: hip ROM limited due to pain  · Neurologic: Mental status: Alert, oriented, thought content appropriate  Subject  Subject  Pertinent Labs & Imaging Studies   rohit  CBC:   Lab Results   Component Value Date    WBC 11.2 07/08/2019    RBC 2.53 07/08/2019    HGB 6.9 07/08/2019    HCT 23.3 07/08/2019    MCV 92.1 07/08/2019    MCH 27.3 07/08/2019 MCHC 29.6 07/08/2019    RDW 15.5 07/08/2019     07/08/2019    MPV 10.6 07/08/2019     CMP:    Lab Results   Component Value Date     07/08/2019    K 4.8 07/08/2019     07/08/2019    CO2 21 07/08/2019    BUN 16 07/08/2019    CREATININE 1.4 07/08/2019    GFRAA 43 07/08/2019    LABGLOM 43 07/08/2019    GLUCOSE 126 07/08/2019    GLUCOSE 121 11/29/2011    PROT 6.2 07/08/2019    LABALBU 2.9 07/08/2019    LABALBU 3.3 11/29/2011    CALCIUM 9.2 07/08/2019    BILITOT <0.2 07/08/2019    ALKPHOS 58 07/08/2019    AST 33 07/08/2019    ALT 11 07/08/2019     LDH:  No results found for: LDH  PT/INR:    Lab Results   Component Value Date    PROTIME 12.5 07/06/2019    PROTIME 11.4 11/28/2011    INR 1.1 07/06/2019     PTT:    Lab Results   Component Value Date    APTT 25.0 07/06/2019           Resident's Assessment and Plan      Macario Shrestha is a 81 y.o. female with a PMH of COPD, HTN, DM and Osteoarthritis presented to ED with left hip pain after a fall. She stated that she stepped off of a curb and felt dizzy, lost balance and fell on her left side. Denies any nausea, vomiting, headache, loss of consciousness, chest pain, light headedness, tingling numbness. At her baseline, she is ambulatory and able to perform most daily activities.         1. Left hip intertrochanteric fracture:  - x-ray findings as above  - discontinue Lovenox due to possible GIB  - early ambulation and SCDs for now  - Tylenol 650mg prn for pain      2. Chronic Normocytic Anemia   - positive FIT last month, likely GIB vs 2/2 femoral fracture  - H/H presently at 6.9/23.2, 8.9/29.2 yesterday  - receiving 1 unit pRBCs  - patient refusing colonoscopy, surgery not currently recommending scopes due to femoral fx  - follow H/H  - transfuse if Hb<7    3. Constipation  - likely 2/2 med and supplement history  - discontinue iron supplements for now  - hold verapamil and vitamin D, Percocet and tramadol  - resume Senna    4.  Fever  - febrile

## 2019-07-08 NOTE — CARE COORDINATION
[facility] has accepted the pt. HENS, ambulette form and envelope on soft chart.  Insurance precert initiated

## 2019-07-08 NOTE — DISCHARGE INSTR - COC
compression fracture of lumbar vertebra (HCC) S32.000A    Closed compression fracture of lumbar vertebra (HCC) S32.000A    Iron deficiency anemia due to chronic blood loss D50.0    Closed compression fracture of lumbar vertebra (HCC) S32.000A    Type 2 diabetes mellitus with hyperglycemia, without long-term current use of insulin (HCC) E11.65    Osteoarthritis M19.90    Chest pain R07.9    Abdominal pain R10.9    Constipation K59.00    Normochromic normocytic anemia D64.9    Lactic acidosis E87.2    Osteopenia M85.80    Weight loss, unintentional R63.4    Closed left hip fracture, initial encounter [facility]) S72.002A    Fall W19. XXXA    Mitral valve insufficiency I34.0       Isolation/Infection:   Isolation          No Isolation            Nurse Assessment:  Last Vital Signs: BP (!) 148/67   Pulse 94   Temp 99.3 °F (37.4 °C) (Temporal)   Resp 16   Ht 5' 3\" (1.6 m)   Wt 118 lb (53.5 kg)   SpO2 94%   Breastfeeding? No   BMI 20.90 kg/m²     Last documented pain score (0-10 scale): Pain Level: 10  Last Weight:   Wt Readings from Last 1 Encounters:   07/06/19 118 lb (53.5 kg)     Mental Status:  disoriented and alert    IV Access:  - None    Nursing Mobility/ADLs:  Walking   Assisted  Transfer  Assisted  Bathing  Assisted  Dressing  Assisted  Toileting  Assisted  Feeding  Independent  Med Admin  Independent  Med Delivery   whole    Wound Care Documentation and Therapy:        Elimination:  Continence:   · Bowel: No  · Bladder: at times  Urinary Catheter: None   Colostomy/Ileostomy/Ileal Conduit: No       Date of Last BM: 7/9/2019    Intake/Output Summary (Last 24 hours) at 7/8/2019 1233  Last data filed at 7/8/2019 1016  Gross per 24 hour   Intake 1150 ml   Output 700 ml   Net 450 ml     I/O last 3 completed shifts: In: 440 [P.O.:240; I.V.:200]  Out: 700 [Urine:650; Blood:50]    Safety Concerns:      At Risk for Falls    Impairments/Disabilities:      Vision    Nutrition Therapy:  Current Nutrition medications.      PHYSICIAN SIGNATURE:  Electronically signed by Vito Saeed MD on 7/10/19 at 12:55 PM

## 2019-07-09 LAB
ANION GAP SERPL CALCULATED.3IONS-SCNC: 13 MMOL/L (ref 7–16)
BUN BLDV-MCNC: 13 MG/DL (ref 8–23)
CALCIUM SERPL-MCNC: 9.3 MG/DL (ref 8.6–10.2)
CHLORIDE BLD-SCNC: 106 MMOL/L (ref 98–107)
CO2: 22 MMOL/L (ref 22–29)
CREAT SERPL-MCNC: 1 MG/DL (ref 0.5–1)
GFR AFRICAN AMERICAN: >60
GFR NON-AFRICAN AMERICAN: >60 ML/MIN/1.73
GLUCOSE BLD-MCNC: 117 MG/DL (ref 74–99)
HCT VFR BLD CALC: 25.6 % (ref 34–48)
HCT VFR BLD CALC: 27.5 % (ref 34–48)
HEMOGLOBIN: 8.6 G/DL (ref 11.5–15.5)
HEMOGLOBIN: 8.6 G/DL (ref 11.5–15.5)
METER GLUCOSE: 103 MG/DL (ref 74–99)
METER GLUCOSE: 107 MG/DL (ref 74–99)
METER GLUCOSE: 129 MG/DL (ref 74–99)
METER GLUCOSE: 145 MG/DL (ref 74–99)
POTASSIUM SERPL-SCNC: 4.6 MMOL/L (ref 3.5–5)
SODIUM BLD-SCNC: 141 MMOL/L (ref 132–146)

## 2019-07-09 PROCEDURE — 85018 HEMOGLOBIN: CPT

## 2019-07-09 PROCEDURE — 85014 HEMATOCRIT: CPT

## 2019-07-09 PROCEDURE — 2580000003 HC RX 258: Performed by: STUDENT IN AN ORGANIZED HEALTH CARE EDUCATION/TRAINING PROGRAM

## 2019-07-09 PROCEDURE — 36415 COLL VENOUS BLD VENIPUNCTURE: CPT

## 2019-07-09 PROCEDURE — 6370000000 HC RX 637 (ALT 250 FOR IP): Performed by: STUDENT IN AN ORGANIZED HEALTH CARE EDUCATION/TRAINING PROGRAM

## 2019-07-09 PROCEDURE — 6370000000 HC RX 637 (ALT 250 FOR IP): Performed by: INTERNAL MEDICINE

## 2019-07-09 PROCEDURE — 82962 GLUCOSE BLOOD TEST: CPT

## 2019-07-09 PROCEDURE — 87088 URINE BACTERIA CULTURE: CPT

## 2019-07-09 PROCEDURE — 97166 OT EVAL MOD COMPLEX 45 MIN: CPT

## 2019-07-09 PROCEDURE — 80048 BASIC METABOLIC PNL TOTAL CA: CPT

## 2019-07-09 PROCEDURE — 97162 PT EVAL MOD COMPLEX 30 MIN: CPT

## 2019-07-09 PROCEDURE — 1200000000 HC SEMI PRIVATE

## 2019-07-09 PROCEDURE — 97530 THERAPEUTIC ACTIVITIES: CPT

## 2019-07-09 RX ORDER — VERAPAMIL HYDROCHLORIDE 240 MG/1
240 TABLET, FILM COATED, EXTENDED RELEASE ORAL NIGHTLY
Qty: 30 TABLET | Refills: 3 | Status: SHIPPED | OUTPATIENT
Start: 2019-07-09 | End: 2019-08-27 | Stop reason: SDUPTHER

## 2019-07-09 RX ORDER — VERAPAMIL HYDROCHLORIDE 240 MG/1
240 TABLET, FILM COATED, EXTENDED RELEASE ORAL NIGHTLY
Status: DISCONTINUED | OUTPATIENT
Start: 2019-07-09 | End: 2019-07-10 | Stop reason: HOSPADM

## 2019-07-09 RX ADMIN — ACETAMINOPHEN 650 MG: 325 TABLET, FILM COATED ORAL at 00:31

## 2019-07-09 RX ADMIN — ACETAMINOPHEN 650 MG: 325 TABLET, FILM COATED ORAL at 06:22

## 2019-07-09 RX ADMIN — Medication 10 ML: at 20:02

## 2019-07-09 ASSESSMENT — PAIN SCALES - GENERAL
PAINLEVEL_OUTOF10: 0
PAINLEVEL_OUTOF10: 0
PAINLEVEL_OUTOF10: 8
PAINLEVEL_OUTOF10: 0
PAINLEVEL_OUTOF10: 6
PAINLEVEL_OUTOF10: 0
PAINLEVEL_OUTOF10: 5
PAINLEVEL_OUTOF10: 10

## 2019-07-09 ASSESSMENT — PAIN DESCRIPTION - DESCRIPTORS
DESCRIPTORS: ACHING;CONSTANT;DISCOMFORT
DESCRIPTORS: ACHING;CONSTANT;DISCOMFORT

## 2019-07-09 ASSESSMENT — PAIN DESCRIPTION - LOCATION
LOCATION: HIP
LOCATION: ABDOMEN;HIP

## 2019-07-09 ASSESSMENT — PAIN DESCRIPTION - ONSET
ONSET: ON-GOING
ONSET: ON-GOING

## 2019-07-09 ASSESSMENT — PAIN DESCRIPTION - ORIENTATION
ORIENTATION: LEFT
ORIENTATION: LEFT

## 2019-07-09 ASSESSMENT — PAIN DESCRIPTION - FREQUENCY
FREQUENCY: CONTINUOUS
FREQUENCY: CONTINUOUS

## 2019-07-09 ASSESSMENT — PAIN DESCRIPTION - PAIN TYPE
TYPE: SURGICAL PAIN
TYPE: SURGICAL PAIN

## 2019-07-09 NOTE — PROGRESS NOTES
Orthopaedic Surgery Progress Note  MD Aixa Hicks  7/9/2019  7:44 AM    SUBJECTIVE:  No acute events over night. Pain controlled. Transfused 1 unit PRBC yesterday for hgb 6.9. Hgb 8.6 today    OBJECTIVE:   AAOx3, NAD    Left lower extremity    Dressings C/D/I  SILT L1-S1  TA/EHL/GS intact  Calf soft, NT  +2 DP, W/WP     Data:  CBC:   Lab Results   Component Value Date    WBC 11.2 07/08/2019    RBC 2.53 07/08/2019    HGB 8.6 07/09/2019    HCT 27.5 07/09/2019    MCV 92.1 07/08/2019    MCH 27.3 07/08/2019    MCHC 29.6 07/08/2019    RDW 15.5 07/08/2019     07/08/2019    MPV 10.6 07/08/2019     BMP:    Lab Results   Component Value Date     07/08/2019    K 4.8 07/08/2019     07/08/2019    CO2 21 07/08/2019    BUN 16 07/08/2019    LABALBU 2.9 07/08/2019    LABALBU 3.3 11/29/2011    CREATININE 1.4 07/08/2019    CALCIUM 9.2 07/08/2019    GFRAA 43 07/08/2019    LABGLOM 43 07/08/2019    GLUCOSE 126 07/08/2019    GLUCOSE 121 11/29/2011         A/P: POD 2 IMN left hip fracture, acute blood loss anemia  - WBAT  - Pain control  - PT/OT  - DVT prophylaxis  - D/C planning    ANTONIO Polanco MD

## 2019-07-09 NOTE — PROGRESS NOTES
[facility]  Internal Medicine Residency Program  Progress Note - House Team 1    Medical student note is for teaching purposes only. Please refer to attending physician's note for assessment and plan. Patient:  Clem Padilla 81 y.o. female MRN: [id number]     Date of Service: 7/9/2019     CC: fall, L hip pain  Overnight events: Hb stable overnight 8.6    Subjective     Clem Padilla is resting comfortably in her chair. She still complains that she \"hasn't had a bowel movement in a week\" despite receiving senna last night. She has been receiving tylenol for pain post-operatively. She has been refusing other medications, including latanoprost, insulin, and post-op cefazolin. Poly-pharmacology may have contributed to her fall and constipation. Verapamil, iron, diphenhydramine, tramadol, and percocet were discontinued. Lovenox and ASA were held due to possible GI blood loss. She believes her fall was due to dehydration. Objective     Physical Exam:  · Vitals: BP (!) 162/75   Pulse 93   Temp 99.4 °F (37.4 °C) (Temporal)   Resp 16   Ht 5' 3\" (1.6 m)   Wt 118 lb (53.5 kg)   SpO2 93%   Breastfeeding? No   BMI 20.90 kg/m²     · I & O - 24hr: No intake/output data recorded. · General Appearance: alert, oriented  · HEENT:  Head: Normocephalic, no lesions, without obvious abnormality. · Neck: supple, symmetrical, trachea midline and thyroid not enlarged, symmetric, no tenderness/mass/nodules  · Lung: clear to auscultation bilaterally  · Heart: normal S1 and S2. Grade II/VI midsystolic murmur best heard at the apex   · Abdomen: diffusely tender   · Extremities:  extremities normal, atraumatic, no cyanosis or edema  · Musculokeletal: No joint swelling, no muscle tenderness. ROM normal in all joints of extremities.    · Neurologic: Mental status: Alert, oriented  Subject  Pertinent Labs & Imaging Studies   rohit  CBC with Differential:    Lab Results   Component Value Date    WBC 11.2

## 2019-07-09 NOTE — PROGRESS NOTES
step to gait pattern, with distance limited by pain. Patient would benefit from continued skilled PT to maximize functional mobility independence. Pt left in bedside chair with all needs met and call bell in reach. Pts/ family goals   1. Get better    Patient and or family understand(s) diagnosis, prognosis, and plan of care. PLAN  PT care will be provided in accordance with the objectives noted above. Whenever appropriate, clear delegation orders will be provided for nursing staff. Exercises and functional mobility practice will be used as well as appropriate assistive devices or modalities to obtain goals. Patient and family education will also be administered as needed.     Frequency of treatments: 5-7x/week    Time in  0825  Time out  [address] [place] [facility] [place] [facility], PT, DPT  [id number]

## 2019-07-09 NOTE — PROGRESS NOTES
112     7. Glaucoma   - latanoprost ophthalmic     PT/OT evaluation: ordered  DVT prophylaxis/ GI prophylaxis: SCDs/none  Disposition: inpatient     Ronel Karen Gomez MD, PGY-1  Attending physician: Wendy Guzman

## 2019-07-10 VITALS
SYSTOLIC BLOOD PRESSURE: 181 MMHG | DIASTOLIC BLOOD PRESSURE: 77 MMHG | TEMPERATURE: 99.8 F | OXYGEN SATURATION: 96 % | HEART RATE: 91 BPM | HEIGHT: 63 IN | RESPIRATION RATE: 16 BRPM | BODY MASS INDEX: 20.91 KG/M2 | WEIGHT: 118 LBS

## 2019-07-10 LAB
METER GLUCOSE: 101 MG/DL (ref 74–99)
METER GLUCOSE: 112 MG/DL (ref 74–99)

## 2019-07-10 PROCEDURE — 6360000002 HC RX W HCPCS: Performed by: STUDENT IN AN ORGANIZED HEALTH CARE EDUCATION/TRAINING PROGRAM

## 2019-07-10 PROCEDURE — 82962 GLUCOSE BLOOD TEST: CPT

## 2019-07-10 PROCEDURE — 2580000003 HC RX 258: Performed by: STUDENT IN AN ORGANIZED HEALTH CARE EDUCATION/TRAINING PROGRAM

## 2019-07-10 RX ORDER — ASPIRIN 81 MG/1
81 TABLET, CHEWABLE ORAL DAILY
Status: DISCONTINUED | OUTPATIENT
Start: 2019-07-10 | End: 2019-07-10 | Stop reason: HOSPADM

## 2019-07-10 RX ORDER — ASPIRIN 81 MG/1
81 TABLET, CHEWABLE ORAL DAILY
Qty: 30 TABLET | Refills: 3 | Status: ON HOLD | OUTPATIENT
Start: 2019-07-10 | End: 2019-09-06 | Stop reason: HOSPADM

## 2019-07-10 RX ORDER — POLYVINYL ALCOHOL 14 MG/ML
1 SOLUTION/ DROPS OPHTHALMIC
Qty: 1 BOTTLE | Refills: 1 | Status: SHIPPED | OUTPATIENT
Start: 2019-07-10 | End: 2019-08-09

## 2019-07-10 RX ORDER — ASPIRIN 325 MG
325 TABLET, DELAYED RELEASE (ENTERIC COATED) ORAL 2 TIMES DAILY
Qty: 60 TABLET | Refills: 0 | Status: SHIPPED | OUTPATIENT
Start: 2019-07-10 | End: 2019-08-27 | Stop reason: ALTCHOICE

## 2019-07-10 RX ORDER — LATANOPROST 50 UG/ML
1 SOLUTION/ DROPS OPHTHALMIC NIGHTLY
Qty: 1 BOTTLE | Refills: 3 | Status: SHIPPED | OUTPATIENT
Start: 2019-07-10

## 2019-07-10 RX ADMIN — Medication 10 ML: at 10:00

## 2019-07-10 RX ADMIN — ENOXAPARIN SODIUM 30 MG: 30 INJECTION SUBCUTANEOUS at 10:00

## 2019-07-10 ASSESSMENT — PAIN SCALES - GENERAL
PAINLEVEL_OUTOF10: 0

## 2019-07-10 NOTE — PROGRESS NOTES
[facility]  Internal Medicine Residency Program  Progress Note - House Team 1    Medical student note is for teaching purposes only. Please refer to attending physician's note for assessment and plan. Patient:  Saray Lamar 81 y.o. female MRN: [id number]     Date of Service: 7/10/2019     CC: fall and L hip pain   Overnight events: pt refusing medications     Subjective     Saray Lamar is laying in bed. She has concerns of not being fed and given her medications despite attempts by the nursing staff of addressing her concerns. She yelled during the entire encounter, insisting that she needs to leave the hospital. Blood cultures were negative. She has been refusing all medications, including verapamil which she requested yesterday. BP is 160/74, last checked at 12:15AM. She states that she wants to go to [address] [place] [address]. Objective     Physical Exam:  · Vitals: BP (!) 181/77   Pulse 91   Temp 99.8 °F (37.7 °C) (Temporal)   Resp 16   Ht 5' 3\" (1.6 m)   Wt 118 lb (53.5 kg)   SpO2 96%   Breastfeeding? No   BMI 20.90 kg/m²     · I & O - 24hr: No intake/output data recorded. · General Appearance: alert, oriented  · HEENT:  Head: Normocephalic, no lesions, without obvious abnormality. · Neck: supple, symmetrical, trachea midline and thyroid not enlarged, symmetric, no tenderness/mass/nodules  · Lung: clear to auscultation bilaterally  · Heart: normal S1 and S2. Grade II/VI midsystolic murmur best heard at the apex   · Abdomen: diffusely tender   · Extremities:  extremities normal, atraumatic, no cyanosis or edema  · Musculokeletal: No joint swelling, no muscle tenderness. ROM normal in all joints of extremities.    · Neurologic: Mental status: Alert, oriented    Pertinent Labs & Imaging Studies   rohit  CBC with Differential:    Lab Results   Component Value Date    WBC 11.2 07/08/2019    RBC 2.53 07/08/2019    HGB 8.6 07/09/2019    HCT 25.6 07/09/2019

## 2019-07-10 NOTE — DISCHARGE SUMMARY
edema  · Musculokeletal: No joint swelling, no muscle tenderness. ROM normal in all joints of extremities. · Neurologic: Mental status: Alert, oriented, thought content appropriate    Pending test results: orthostatic vitals    Consults:  1. Orthopedic surgery  2. General Surgery    Procedures:  1. ORIF L hip    Condition at discharge: Stable    Disposition: [facility]    Discharge Medications:  Current Discharge Medication List      START taking these medications    Details   aspirin 81 MG chewable tablet Take 1 tablet by mouth daily  Qty: 30 tablet, Refills: 3      latanoprost (XALATAN) 0.005 % ophthalmic solution Place 1 drop into both eyes nightly  Qty: 1 Bottle, Refills: 3      polyvinyl alcohol (LIQUIFILM TEARS) 1.4 % ophthalmic solution Place 1 drop into both eyes every 2 hours as needed for Dry Eyes  Qty: 1 Bottle, Refills: 1      oxyCODONE-acetaminophen (PERCOCET) 5-325 MG per tablet Take 1 tablet by mouth every 6 hours as needed for Pain for up to 7 days. Intended supply: 7 days. Take lowest dose possible to manage pain  Qty: 28 tablet, Refills: 0    Comments: Reduce doses taken as pain becomes manageable  Associated Diagnoses: Closed left hip fracture, initial encounter ([facility] [address]         CONTINUE these medications which have CHANGED    Details   !! verapamil (CALAN SR) 240 MG extended release tablet Take 1 tablet by mouth nightly  Qty: 30 tablet, Refills: 3       !! - Potential duplicate medications found. Please discuss with provider.       CONTINUE these medications which have NOT CHANGED    Details   senna (SENOKOT) 8.6 MG tablet Take 1 tablet by mouth 2 times daily  Qty: 60 tablet, Refills: 0    Associated Diagnoses: Chronic idiopathic constipation      !! verapamil (CALAN SR) 240 MG extended release tablet Take 1 tablet by mouth nightly  Qty: 90 tablet, Refills: 1    Associated Diagnoses: Essential hypertension      glucose blood VI test strips (ASCENSIA AUTODISC VI;ONE TOUCH ULTRA TEST VI) strip

## 2019-07-11 ENCOUNTER — TELEPHONE (OUTPATIENT)
Dept: INTERNAL MEDICINE | Age: 84
End: 2019-07-11

## 2019-07-11 LAB — URINE CULTURE, ROUTINE: NORMAL

## 2019-07-13 ENCOUNTER — HOSPITAL ENCOUNTER (EMERGENCY)
Age: 84
Discharge: PSYCHIATRIC OR SUBSTANCE USE FACILITY | End: 2019-07-14
Attending: EMERGENCY MEDICINE
Payer: COMMERCIAL

## 2019-07-13 DIAGNOSIS — R45.851 DEPRESSION WITH SUICIDAL IDEATION: Primary | ICD-10-CM

## 2019-07-13 DIAGNOSIS — G89.29 CHRONIC ABDOMINAL PAIN: ICD-10-CM

## 2019-07-13 DIAGNOSIS — R10.9 CHRONIC ABDOMINAL PAIN: ICD-10-CM

## 2019-07-13 DIAGNOSIS — F32.A DEPRESSION WITH SUICIDAL IDEATION: Primary | ICD-10-CM

## 2019-07-13 LAB
ACETAMINOPHEN LEVEL: <5 MCG/ML (ref 10–30)
BASOPHILS ABSOLUTE: 0.08 E9/L (ref 0–0.2)
BASOPHILS RELATIVE PERCENT: 0.8 % (ref 0–2)
BILIRUBIN URINE: NEGATIVE
BLOOD CULTURE, ROUTINE: NORMAL
BLOOD, URINE: NEGATIVE
CLARITY: CLEAR
COLOR: YELLOW
CULTURE, BLOOD 2: NORMAL
EOSINOPHILS ABSOLUTE: 0.32 E9/L (ref 0.05–0.5)
EOSINOPHILS RELATIVE PERCENT: 3.4 % (ref 0–6)
ETHANOL: <10 MG/DL (ref 0–0.08)
GLUCOSE URINE: NEGATIVE MG/DL
HCT VFR BLD CALC: 24.1 % (ref 34–48)
HEMOGLOBIN: 7.8 G/DL (ref 11.5–15.5)
IMMATURE GRANULOCYTES #: 0.06 E9/L
IMMATURE GRANULOCYTES %: 0.6 % (ref 0–5)
KETONES, URINE: NEGATIVE MG/DL
LEUKOCYTE ESTERASE, URINE: NEGATIVE
LIPASE: 25 U/L (ref 13–60)
LYMPHOCYTES ABSOLUTE: 1.5 E9/L (ref 1.5–4)
LYMPHOCYTES RELATIVE PERCENT: 15.9 % (ref 20–42)
MCH RBC QN AUTO: 27.9 PG (ref 26–35)
MCHC RBC AUTO-ENTMCNC: 32.4 % (ref 32–34.5)
MCV RBC AUTO: 86.1 FL (ref 80–99.9)
MONOCYTES ABSOLUTE: 0.98 E9/L (ref 0.1–0.95)
MONOCYTES RELATIVE PERCENT: 10.4 % (ref 2–12)
NEUTROPHILS ABSOLUTE: 6.49 E9/L (ref 1.8–7.3)
NEUTROPHILS RELATIVE PERCENT: 68.9 % (ref 43–80)
NITRITE, URINE: NEGATIVE
PDW BLD-RTO: 14.9 FL (ref 11.5–15)
PH UA: 5.5 (ref 5–9)
PLATELET # BLD: 319 E9/L (ref 130–450)
PMV BLD AUTO: 10.2 FL (ref 7–12)
PROTEIN UA: 30 MG/DL
RBC # BLD: 2.8 E12/L (ref 3.5–5.5)
SALICYLATE, SERUM: <0.3 MG/DL (ref 0–30)
SPECIFIC GRAVITY UA: <=1.005 (ref 1–1.03)
TRICYCLIC ANTIDEPRESSANTS SCREEN SERUM: NEGATIVE NG/ML
UROBILINOGEN, URINE: 0.2 E.U./DL
WBC # BLD: 9.4 E9/L (ref 4.5–11.5)

## 2019-07-13 PROCEDURE — G0480 DRUG TEST DEF 1-7 CLASSES: HCPCS

## 2019-07-13 PROCEDURE — 80053 COMPREHEN METABOLIC PANEL: CPT

## 2019-07-13 PROCEDURE — 99285 EMERGENCY DEPT VISIT HI MDM: CPT

## 2019-07-13 PROCEDURE — 83735 ASSAY OF MAGNESIUM: CPT

## 2019-07-13 PROCEDURE — 93005 ELECTROCARDIOGRAM TRACING: CPT | Performed by: EMERGENCY MEDICINE

## 2019-07-13 PROCEDURE — 85025 COMPLETE CBC W/AUTO DIFF WBC: CPT

## 2019-07-13 PROCEDURE — 80307 DRUG TEST PRSMV CHEM ANLYZR: CPT

## 2019-07-13 PROCEDURE — 81001 URINALYSIS AUTO W/SCOPE: CPT

## 2019-07-13 PROCEDURE — 83690 ASSAY OF LIPASE: CPT

## 2019-07-13 PROCEDURE — 36415 COLL VENOUS BLD VENIPUNCTURE: CPT

## 2019-07-13 RX ORDER — LORAZEPAM 2 MG/ML
0.5 INJECTION INTRAMUSCULAR ONCE
Status: DISCONTINUED | OUTPATIENT
Start: 2019-07-13 | End: 2019-07-14 | Stop reason: HOSPADM

## 2019-07-13 RX ORDER — HALOPERIDOL 5 MG/ML
2 INJECTION INTRAMUSCULAR ONCE
Status: DISCONTINUED | OUTPATIENT
Start: 2019-07-13 | End: 2019-07-14 | Stop reason: HOSPADM

## 2019-07-13 RX ORDER — DIPHENHYDRAMINE HYDROCHLORIDE 50 MG/ML
12.5 INJECTION INTRAMUSCULAR; INTRAVENOUS ONCE
Status: DISCONTINUED | OUTPATIENT
Start: 2019-07-13 | End: 2019-07-14 | Stop reason: HOSPADM

## 2019-07-13 ASSESSMENT — PAIN SCALES - GENERAL: PAINLEVEL_OUTOF10: 10

## 2019-07-13 ASSESSMENT — PAIN DESCRIPTION - ORIENTATION: ORIENTATION: RIGHT;LOWER

## 2019-07-13 ASSESSMENT — PAIN DESCRIPTION - LOCATION: LOCATION: ABDOMEN

## 2019-07-13 ASSESSMENT — ENCOUNTER SYMPTOMS: ABDOMINAL PAIN: 1

## 2019-07-14 ENCOUNTER — APPOINTMENT (OUTPATIENT)
Dept: CT IMAGING | Age: 84
End: 2019-07-14
Payer: COMMERCIAL

## 2019-07-14 VITALS
DIASTOLIC BLOOD PRESSURE: 68 MMHG | HEIGHT: 63 IN | WEIGHT: 118 LBS | SYSTOLIC BLOOD PRESSURE: 159 MMHG | HEART RATE: 92 BPM | TEMPERATURE: 98.9 F | RESPIRATION RATE: 20 BRPM | BODY MASS INDEX: 20.91 KG/M2 | OXYGEN SATURATION: 97 %

## 2019-07-14 LAB
ALBUMIN SERPL-MCNC: 2.9 G/DL (ref 3.5–5.2)
ALP BLD-CCNC: 68 U/L (ref 35–104)
ALT SERPL-CCNC: 22 U/L (ref 0–32)
AMPHETAMINE SCREEN, URINE: NOT DETECTED
ANION GAP SERPL CALCULATED.3IONS-SCNC: 12 MMOL/L (ref 7–16)
AST SERPL-CCNC: 30 U/L (ref 0–31)
BACTERIA: ABNORMAL /HPF
BARBITURATE SCREEN URINE: NOT DETECTED
BENZODIAZEPINE SCREEN, URINE: NOT DETECTED
BILIRUB SERPL-MCNC: 0.4 MG/DL (ref 0–1.2)
BUN BLDV-MCNC: 16 MG/DL (ref 8–23)
CALCIUM SERPL-MCNC: 9.1 MG/DL (ref 8.6–10.2)
CANNABINOID SCREEN URINE: NOT DETECTED
CHLORIDE BLD-SCNC: 99 MMOL/L (ref 98–107)
CO2: 24 MMOL/L (ref 22–29)
COCAINE METABOLITE SCREEN URINE: NOT DETECTED
CREAT SERPL-MCNC: 1.1 MG/DL (ref 0.5–1)
EKG ATRIAL RATE: 104 BPM
EKG P AXIS: 81 DEGREES
EKG P-R INTERVAL: 154 MS
EKG Q-T INTERVAL: 352 MS
EKG QRS DURATION: 80 MS
EKG QTC CALCULATION (BAZETT): 462 MS
EKG R AXIS: 37 DEGREES
EKG T AXIS: 70 DEGREES
EKG VENTRICULAR RATE: 104 BPM
EPITHELIAL CELLS, UA: ABNORMAL /HPF
GFR AFRICAN AMERICAN: 57
GFR NON-AFRICAN AMERICAN: 57 ML/MIN/1.73
GLUCOSE BLD-MCNC: 118 MG/DL (ref 74–99)
MAGNESIUM: 1.7 MG/DL (ref 1.6–2.6)
METHADONE SCREEN, URINE: NOT DETECTED
OPIATE SCREEN URINE: NOT DETECTED
PHENCYCLIDINE SCREEN URINE: NOT DETECTED
POTASSIUM REFLEX MAGNESIUM: 3.4 MMOL/L (ref 3.5–5)
PROPOXYPHENE SCREEN: NOT DETECTED
RBC UA: ABNORMAL /HPF (ref 0–2)
SODIUM BLD-SCNC: 135 MMOL/L (ref 132–146)
TOTAL PROTEIN: 5.7 G/DL (ref 6.4–8.3)
WBC UA: ABNORMAL /HPF (ref 0–5)
YEAST: ABNORMAL

## 2019-07-14 PROCEDURE — 74177 CT ABD & PELVIS W/CONTRAST: CPT

## 2019-07-14 PROCEDURE — 6370000000 HC RX 637 (ALT 250 FOR IP): Performed by: EMERGENCY MEDICINE

## 2019-07-14 PROCEDURE — 6360000004 HC RX CONTRAST MEDICATION: Performed by: RADIOLOGY

## 2019-07-14 PROCEDURE — 93010 ELECTROCARDIOGRAM REPORT: CPT | Performed by: INTERNAL MEDICINE

## 2019-07-14 PROCEDURE — 70450 CT HEAD/BRAIN W/O DYE: CPT

## 2019-07-14 RX ORDER — SODIUM CHLORIDE 0.9 % (FLUSH) 0.9 %
10 SYRINGE (ML) INJECTION PRN
Status: DISCONTINUED | OUTPATIENT
Start: 2019-07-14 | End: 2019-07-14 | Stop reason: HOSPADM

## 2019-07-14 RX ORDER — ACETAMINOPHEN 500 MG
500 TABLET ORAL ONCE
Status: COMPLETED | OUTPATIENT
Start: 2019-07-14 | End: 2019-07-14

## 2019-07-14 RX ORDER — VERAPAMIL HYDROCHLORIDE 240 MG/1
240 TABLET, FILM COATED, EXTENDED RELEASE ORAL ONCE
Status: COMPLETED | OUTPATIENT
Start: 2019-07-14 | End: 2019-07-14

## 2019-07-14 RX ADMIN — VERAPAMIL HYDROCHLORIDE 240 MG: 240 TABLET, FILM COATED, EXTENDED RELEASE ORAL at 15:51

## 2019-07-14 RX ADMIN — IOPAMIDOL 110 ML: 755 INJECTION, SOLUTION INTRAVENOUS at 01:44

## 2019-07-14 RX ADMIN — ACETAMINOPHEN 500 MG: 500 TABLET ORAL at 15:51

## 2019-07-14 ASSESSMENT — PAIN SCALES - GENERAL: PAINLEVEL_OUTOF10: 8

## 2019-07-14 NOTE — ED PROVIDER NOTES
comparison. Patient was placed into observation status at 3:15 AM.  The reason for the observation period was psych evaluation. The plan during observation includes like evaluation management. Re-Examination/Results: Upon re-examination at time: 3:15 AM, additional finding/lab/imaging results show no change and a repeat physical examination demonstrates no change    Disposition Time: At time 3:15 AM, a final disposition status was chosen and noted in the chart.     Patient is medically cleared at 3:15 AM for psych evaluation management.          Lila Calzada, DO  07/14/19 Faina, DO  07/14/19 1098 S  25 Jessy 109, DO  07/14/19 9425

## 2019-07-14 NOTE — ED NOTES
Pt has 3 small stapled healing incisions to her left hip and leg. No redness or drainage or separation noted.       Sneha Aparicio RN  07/14/19 8501

## 2019-07-14 NOTE — ED NOTES
Pt currently calm and cooperative. Urine obtained via straight cath. Pt c/o right hip pain. Pt states she is in facility for rehab but wants to get rehab here. Explained to pt that that may not be possible. Pt states she s/w someone regarding her glasses and they should be bringing them. Pt has purse at bedside.  Pt awaiting CT     Laura Gillespie RN  07/14/19 9353

## 2019-07-14 NOTE — ED NOTES
Pt in room talking to  about how she's going to call a cab and crawl to the cab and go home      Olga Freitas RN  07/13/19 6431

## 2019-07-14 NOTE — ED NOTES
Pt is alert and oriented x4, cooperative. Pt still reports SI, no plan stated. No voiced HI or hallucinations. Pt has been cooperative. Up with assist. Will continue to monitor.       Matt Hoffmann RN  07/14/19 2012

## 2019-07-14 NOTE — ED NOTES
LILIANA placed phone call to [facility] [phone]. Currently on hold waiting to speak with a rep.     Attempting to arrange transport to [address] [place] [address] Way [address]    Phone: [phone] KARINA Browning, [facility]  07/14/19 1201

## 2019-07-17 ENCOUNTER — TELEPHONE (OUTPATIENT)
Dept: INTERNAL MEDICINE | Age: 84
End: 2019-07-17

## 2019-07-18 NOTE — TELEPHONE ENCOUNTER
Message left at [facility]  for Herminia Heading that message should be referred to orthopedic surgeon Elijah Malin for instructions regarding hip.  Will forward message to his office

## 2019-07-22 ENCOUNTER — TELEPHONE (OUTPATIENT)
Dept: SURGERY | Age: 84
End: 2019-07-22

## 2019-08-27 ENCOUNTER — APPOINTMENT (OUTPATIENT)
Dept: CT IMAGING | Age: 84
DRG: 853 | End: 2019-08-27
Payer: COMMERCIAL

## 2019-08-27 ENCOUNTER — HOSPITAL ENCOUNTER (INPATIENT)
Age: 84
LOS: 10 days | Discharge: NURSING, REHAB OR LONG-TERM CARE | DRG: 853 | End: 2019-09-06
Attending: EMERGENCY MEDICINE | Admitting: INTERNAL MEDICINE
Payer: COMMERCIAL

## 2019-08-27 DIAGNOSIS — K52.9 ENTEROCOLITIS: ICD-10-CM

## 2019-08-27 DIAGNOSIS — D72.829 LEUKOCYTOSIS, UNSPECIFIED TYPE: ICD-10-CM

## 2019-08-27 DIAGNOSIS — K63.89 PNEUMATOSIS INTESTINALIS: Primary | ICD-10-CM

## 2019-08-27 LAB
ALBUMIN SERPL-MCNC: 4 G/DL (ref 3.5–5.2)
ALP BLD-CCNC: 114 U/L (ref 35–104)
ALT SERPL-CCNC: 12 U/L (ref 0–32)
ANION GAP SERPL CALCULATED.3IONS-SCNC: 12 MMOL/L (ref 7–16)
AST SERPL-CCNC: 24 U/L (ref 0–31)
BACTERIA: NORMAL /HPF
BASOPHILS ABSOLUTE: 0.06 E9/L (ref 0–0.2)
BASOPHILS RELATIVE PERCENT: 0.5 % (ref 0–2)
BILIRUB SERPL-MCNC: <0.2 MG/DL (ref 0–1.2)
BILIRUBIN URINE: NEGATIVE
BLOOD, URINE: NEGATIVE
BUN BLDV-MCNC: 15 MG/DL (ref 8–23)
CALCIUM SERPL-MCNC: 10 MG/DL (ref 8.6–10.2)
CHLORIDE BLD-SCNC: 101 MMOL/L (ref 98–107)
CLARITY: CLEAR
CO2: 28 MMOL/L (ref 22–29)
COLOR: YELLOW
CREAT SERPL-MCNC: 1 MG/DL (ref 0.5–1)
EKG ATRIAL RATE: 92 BPM
EKG P AXIS: 81 DEGREES
EKG P-R INTERVAL: 164 MS
EKG Q-T INTERVAL: 402 MS
EKG QRS DURATION: 80 MS
EKG QTC CALCULATION (BAZETT): 497 MS
EKG R AXIS: 66 DEGREES
EKG T AXIS: 77 DEGREES
EKG VENTRICULAR RATE: 92 BPM
EOSINOPHILS ABSOLUTE: 0.01 E9/L (ref 0.05–0.5)
EOSINOPHILS RELATIVE PERCENT: 0.1 % (ref 0–6)
GFR AFRICAN AMERICAN: >60
GFR NON-AFRICAN AMERICAN: >60 ML/MIN/1.73
GLUCOSE BLD-MCNC: 146 MG/DL (ref 74–99)
GLUCOSE URINE: NEGATIVE MG/DL
HCT VFR BLD CALC: 32.9 % (ref 34–48)
HEMOGLOBIN: 10.2 G/DL (ref 11.5–15.5)
IMMATURE GRANULOCYTES #: 0.09 E9/L
IMMATURE GRANULOCYTES %: 0.7 % (ref 0–5)
KETONES, URINE: NEGATIVE MG/DL
LACTIC ACID: 1.6 MMOL/L (ref 0.5–2.2)
LEUKOCYTE ESTERASE, URINE: ABNORMAL
LIPASE: 19 U/L (ref 13–60)
LYMPHOCYTES ABSOLUTE: 0.89 E9/L (ref 1.5–4)
LYMPHOCYTES RELATIVE PERCENT: 6.9 % (ref 20–42)
MCH RBC QN AUTO: 27.7 PG (ref 26–35)
MCHC RBC AUTO-ENTMCNC: 31 % (ref 32–34.5)
MCV RBC AUTO: 89.4 FL (ref 80–99.9)
MONOCYTES ABSOLUTE: 0.57 E9/L (ref 0.1–0.95)
MONOCYTES RELATIVE PERCENT: 4.4 % (ref 2–12)
NEUTROPHILS ABSOLUTE: 11.24 E9/L (ref 1.8–7.3)
NEUTROPHILS RELATIVE PERCENT: 87.4 % (ref 43–80)
NITRITE, URINE: NEGATIVE
PDW BLD-RTO: 15.8 FL (ref 11.5–15)
PH UA: 6.5 (ref 5–9)
PLATELET # BLD: 273 E9/L (ref 130–450)
PMV BLD AUTO: 11.6 FL (ref 7–12)
POTASSIUM REFLEX MAGNESIUM: 3.6 MMOL/L (ref 3.5–5)
PROTEIN UA: 30 MG/DL
RBC # BLD: 3.68 E12/L (ref 3.5–5.5)
RBC UA: NORMAL /HPF (ref 0–2)
SODIUM BLD-SCNC: 141 MMOL/L (ref 132–146)
SPECIFIC GRAVITY UA: 1.01 (ref 1–1.03)
TOTAL PROTEIN: 7.8 G/DL (ref 6.4–8.3)
TROPONIN: 0.03 NG/ML (ref 0–0.03)
UROBILINOGEN, URINE: 0.2 E.U./DL
WBC # BLD: 12.9 E9/L (ref 4.5–11.5)
WBC UA: NORMAL /HPF (ref 0–5)

## 2019-08-27 PROCEDURE — 99285 EMERGENCY DEPT VISIT HI MDM: CPT

## 2019-08-27 PROCEDURE — 74176 CT ABD & PELVIS W/O CONTRAST: CPT

## 2019-08-27 PROCEDURE — 2580000003 HC RX 258: Performed by: STUDENT IN AN ORGANIZED HEALTH CARE EDUCATION/TRAINING PROGRAM

## 2019-08-27 PROCEDURE — 83690 ASSAY OF LIPASE: CPT

## 2019-08-27 PROCEDURE — 80053 COMPREHEN METABOLIC PANEL: CPT

## 2019-08-27 PROCEDURE — 99024 POSTOP FOLLOW-UP VISIT: CPT | Performed by: SURGERY

## 2019-08-27 PROCEDURE — 36415 COLL VENOUS BLD VENIPUNCTURE: CPT

## 2019-08-27 PROCEDURE — 84484 ASSAY OF TROPONIN QUANT: CPT

## 2019-08-27 PROCEDURE — 6360000002 HC RX W HCPCS: Performed by: STUDENT IN AN ORGANIZED HEALTH CARE EDUCATION/TRAINING PROGRAM

## 2019-08-27 PROCEDURE — 81001 URINALYSIS AUTO W/SCOPE: CPT

## 2019-08-27 PROCEDURE — 93010 ELECTROCARDIOGRAM REPORT: CPT | Performed by: INTERNAL MEDICINE

## 2019-08-27 PROCEDURE — 93005 ELECTROCARDIOGRAM TRACING: CPT | Performed by: STUDENT IN AN ORGANIZED HEALTH CARE EDUCATION/TRAINING PROGRAM

## 2019-08-27 PROCEDURE — 2500000003 HC RX 250 WO HCPCS: Performed by: STUDENT IN AN ORGANIZED HEALTH CARE EDUCATION/TRAINING PROGRAM

## 2019-08-27 PROCEDURE — 1200000000 HC SEMI PRIVATE

## 2019-08-27 PROCEDURE — 96374 THER/PROPH/DIAG INJ IV PUSH: CPT

## 2019-08-27 PROCEDURE — 2580000003 HC RX 258: Performed by: INTERNAL MEDICINE

## 2019-08-27 PROCEDURE — 87040 BLOOD CULTURE FOR BACTERIA: CPT

## 2019-08-27 PROCEDURE — 85025 COMPLETE CBC W/AUTO DIFF WBC: CPT

## 2019-08-27 PROCEDURE — 83605 ASSAY OF LACTIC ACID: CPT

## 2019-08-27 RX ORDER — FENTANYL CITRATE 50 UG/ML
25 INJECTION, SOLUTION INTRAMUSCULAR; INTRAVENOUS ONCE
Status: COMPLETED | OUTPATIENT
Start: 2019-08-27 | End: 2019-08-27

## 2019-08-27 RX ORDER — VERAPAMIL HYDROCHLORIDE 240 MG/1
240 TABLET, FILM COATED, EXTENDED RELEASE ORAL NIGHTLY
Status: DISCONTINUED | OUTPATIENT
Start: 2019-08-27 | End: 2019-08-28

## 2019-08-27 RX ORDER — HEPARIN SODIUM (PORCINE) LOCK FLUSH IV SOLN 100 UNIT/ML 100 UNIT/ML
3 SOLUTION INTRAVENOUS PRN
Status: DISCONTINUED | OUTPATIENT
Start: 2019-08-27 | End: 2019-08-27

## 2019-08-27 RX ORDER — ONDANSETRON 2 MG/ML
4 INJECTION INTRAMUSCULAR; INTRAVENOUS ONCE
Status: COMPLETED | OUTPATIENT
Start: 2019-08-27 | End: 2019-08-27

## 2019-08-27 RX ORDER — HEPARIN SODIUM (PORCINE) LOCK FLUSH IV SOLN 100 UNIT/ML 100 UNIT/ML
3 SOLUTION INTRAVENOUS EVERY 12 HOURS SCHEDULED
Status: DISCONTINUED | OUTPATIENT
Start: 2019-08-27 | End: 2019-08-27

## 2019-08-27 RX ORDER — LIDOCAINE HYDROCHLORIDE 10 MG/ML
5 INJECTION, SOLUTION EPIDURAL; INFILTRATION; INTRACAUDAL; PERINEURAL ONCE
Status: DISCONTINUED | OUTPATIENT
Start: 2019-08-27 | End: 2019-08-27

## 2019-08-27 RX ORDER — SODIUM CHLORIDE 0.9 % (FLUSH) 0.9 %
10 SYRINGE (ML) INJECTION EVERY 12 HOURS SCHEDULED
Status: DISCONTINUED | OUTPATIENT
Start: 2019-08-27 | End: 2019-09-06 | Stop reason: HOSPADM

## 2019-08-27 RX ORDER — MORPHINE SULFATE 2 MG/ML
1 INJECTION, SOLUTION INTRAMUSCULAR; INTRAVENOUS EVERY 4 HOURS PRN
Status: DISCONTINUED | OUTPATIENT
Start: 2019-08-27 | End: 2019-08-28

## 2019-08-27 RX ORDER — SODIUM CHLORIDE 0.9 % (FLUSH) 0.9 %
10 SYRINGE (ML) INJECTION PRN
Status: DISCONTINUED | OUTPATIENT
Start: 2019-08-27 | End: 2019-08-27

## 2019-08-27 RX ORDER — SODIUM CHLORIDE 0.9 % (FLUSH) 0.9 %
10 SYRINGE (ML) INJECTION PRN
Status: DISCONTINUED | OUTPATIENT
Start: 2019-08-27 | End: 2019-09-06 | Stop reason: HOSPADM

## 2019-08-27 RX ORDER — ACETAMINOPHEN 325 MG/1
650 TABLET ORAL EVERY 4 HOURS PRN
Status: DISCONTINUED | OUTPATIENT
Start: 2019-08-27 | End: 2019-08-28

## 2019-08-27 RX ORDER — PANTOPRAZOLE SODIUM 40 MG/1
40 TABLET, DELAYED RELEASE ORAL
Status: DISCONTINUED | OUTPATIENT
Start: 2019-08-27 | End: 2019-08-28

## 2019-08-27 RX ORDER — 0.9 % SODIUM CHLORIDE 0.9 %
1000 INTRAVENOUS SOLUTION INTRAVENOUS ONCE
Status: COMPLETED | OUTPATIENT
Start: 2019-08-27 | End: 2019-08-27

## 2019-08-27 RX ORDER — ASPIRIN 81 MG/1
81 TABLET, CHEWABLE ORAL DAILY
Status: CANCELLED | OUTPATIENT
Start: 2019-08-27

## 2019-08-27 RX ORDER — NICOTINE POLACRILEX 4 MG
15 LOZENGE BUCCAL PRN
Status: DISCONTINUED | OUTPATIENT
Start: 2019-08-27 | End: 2019-08-31

## 2019-08-27 RX ORDER — DEXTROSE MONOHYDRATE 25 G/50ML
12.5 INJECTION, SOLUTION INTRAVENOUS PRN
Status: DISCONTINUED | OUTPATIENT
Start: 2019-08-27 | End: 2019-08-31

## 2019-08-27 RX ORDER — POTASSIUM CHLORIDE 20 MEQ/1
40 TABLET, EXTENDED RELEASE ORAL PRN
Status: DISCONTINUED | OUTPATIENT
Start: 2019-08-27 | End: 2019-08-28

## 2019-08-27 RX ORDER — DEXTROSE AND SODIUM CHLORIDE 5; .45 G/100ML; G/100ML
INJECTION, SOLUTION INTRAVENOUS CONTINUOUS
Status: DISCONTINUED | OUTPATIENT
Start: 2019-08-27 | End: 2019-08-28

## 2019-08-27 RX ORDER — DEXTROSE MONOHYDRATE 50 MG/ML
100 INJECTION, SOLUTION INTRAVENOUS PRN
Status: DISCONTINUED | OUTPATIENT
Start: 2019-08-27 | End: 2019-08-31

## 2019-08-27 RX ORDER — POTASSIUM CHLORIDE 7.45 MG/ML
10 INJECTION INTRAVENOUS PRN
Status: DISCONTINUED | OUTPATIENT
Start: 2019-08-27 | End: 2019-08-28

## 2019-08-27 RX ORDER — LABETALOL HYDROCHLORIDE 5 MG/ML
2.5 INJECTION, SOLUTION INTRAVENOUS EVERY 4 HOURS PRN
Status: DISCONTINUED | OUTPATIENT
Start: 2019-08-27 | End: 2019-08-31

## 2019-08-27 RX ADMIN — METRONIDAZOLE 500 MG: 500 INJECTION, SOLUTION INTRAVENOUS at 15:04

## 2019-08-27 RX ADMIN — DEXTROSE AND SODIUM CHLORIDE: 5; 450 INJECTION, SOLUTION INTRAVENOUS at 21:42

## 2019-08-27 RX ADMIN — SODIUM CHLORIDE 1000 ML: 9 INJECTION, SOLUTION INTRAVENOUS at 13:56

## 2019-08-27 RX ADMIN — ONDANSETRON HYDROCHLORIDE 4 MG: 2 SOLUTION INTRAMUSCULAR; INTRAVENOUS at 13:48

## 2019-08-27 RX ADMIN — FENTANYL CITRATE: 50 INJECTION INTRAMUSCULAR; INTRAVENOUS at 13:50

## 2019-08-27 RX ADMIN — CEFTRIAXONE SODIUM 2 G: 2 INJECTION, POWDER, FOR SOLUTION INTRAMUSCULAR; INTRAVENOUS at 13:57

## 2019-08-27 ASSESSMENT — ENCOUNTER SYMPTOMS
ABDOMINAL PAIN: 1
BLOOD IN STOOL: 0
VOICE CHANGE: 0
HEMATOCHEZIA: 0
BACK PAIN: 0
PHOTOPHOBIA: 0
NAUSEA: 1
TROUBLE SWALLOWING: 0
VOMITING: 1
DIARRHEA: 0
COUGH: 0
SHORTNESS OF BREATH: 0

## 2019-08-27 ASSESSMENT — PAIN DESCRIPTION - FREQUENCY: FREQUENCY: CONTINUOUS

## 2019-08-27 ASSESSMENT — PAIN DESCRIPTION - LOCATION: LOCATION: ABDOMEN

## 2019-08-27 ASSESSMENT — PAIN SCALES - GENERAL
PAINLEVEL_OUTOF10: 0
PAINLEVEL_OUTOF10: 10
PAINLEVEL_OUTOF10: 10

## 2019-08-27 ASSESSMENT — PAIN DESCRIPTION - ORIENTATION: ORIENTATION: MID;UPPER

## 2019-08-27 ASSESSMENT — PAIN DESCRIPTION - PAIN TYPE: TYPE: ACUTE PAIN

## 2019-08-27 ASSESSMENT — PAIN DESCRIPTION - DESCRIPTORS: DESCRIPTORS: STABBING

## 2019-08-27 NOTE — ED PROVIDER NOTES
(H) 35 - 104 U/L    ALT 12 0 - 32 U/L    AST 24 0 - 31 U/L   Urinalysis   Result Value Ref Range    Color, UA Yellow Straw/Yellow    Clarity, UA Clear Clear    Glucose, Ur Negative Negative mg/dL    Bilirubin Urine Negative Negative    Ketones, Urine Negative Negative mg/dL    Specific Gravity, UA 1.015 1.005 - 1.030    Blood, Urine Negative Negative    pH, UA 6.5 5.0 - 9.0    Protein, UA 30 (A) Negative mg/dL    Urobilinogen, Urine 0.2 <2.0 E.U./dL    Nitrite, Urine Negative Negative    Leukocyte Esterase, Urine TRACE (A) Negative   Lipase   Result Value Ref Range    Lipase 19 13 - 60 U/L   Lactic Acid, Plasma   Result Value Ref Range    Lactic Acid 1.6 0.5 - 2.2 mmol/L   Troponin   Result Value Ref Range    Troponin 0.03 0.00 - 0.03 ng/mL   Microscopic Urinalysis   Result Value Ref Range    WBC, UA 1-3 0 - 5 /HPF    RBC, UA NONE 0 - 2 /HPF    Bacteria, UA NONE /HPF   EKG 12 Lead   Result Value Ref Range    Ventricular Rate 92 BPM    Atrial Rate 92 BPM    P-R Interval 164 ms    QRS Duration 80 ms    Q-T Interval 402 ms    QTc Calculation (Bazett) 497 ms    P Axis 81 degrees    R Axis 66 degrees    T Axis 77 degrees       RADIOLOGY:  CT ABDOMEN PELVIS WO CONTRAST   Final Result   Inflammatory changes with wall thickening and edema in the cecum and   ascending colon as well as a dilated bowel loop in the right lower   quadrant with the suggestion of pneumatosis. There is also suggestion   of portal venous air concerning for severe inflammation. Consideration   should be given to severe enterocolitis in the right lower quadrant. Examination is otherwise limited due to lack of contrast.      ALERT:  THIS IS AN ABNORMAL REPORT                    ------------------------- NURSING NOTES AND VITALS REVIEWED ---------------------------  Date / Time Roomed:  8/27/2019  5:30 AM  ED Bed Assignment:  24/24    The nursing notes within the ED encounter and vital signs as below have been reviewed.      Patient Vitals for the past

## 2019-08-27 NOTE — CONSULTS
Rocephin and Flagyl, n.p.o., IV fluids   - recommend admission under medical service. Plan will be discussed with Dr. Oneal Colvin .     Sarita Aggarwal DO  Resident, PGY-1  8/27/2019  1:58 PM

## 2019-08-27 NOTE — H&P
Check glucose Q6H   - Keep patient NPO   - Continue IV D5 w/ 1/2 NS at 200 ml/hr  4. Prolonged QTc   - 497   -Repeat ECG   -Use Tigan for n/v control      PT/OT evaluation:  DVT prophylaxis/ GI prophylaxis: Lovenox/Protonix  Disposition: Med/Surg    Tom Charlton, PhD  Attending physician: Dr. Vicenta Phillips

## 2019-08-27 NOTE — H&P
mg  - Continue home meds and add Labetolol PRN  - Continue to monitor hemodynamics   -  3. Prolonged QTc  - Qtc 497  - Repeat ECG  - Avoid QT prolonging meds  - Use Tigan for n/v control   -  4.  DM 2  - Not on home PO meds  - Will check glucose Q6H  - Keep patient NPO   - Continue with IV D5 w/ 1/2 normal saline at 200 ml/hr    -      PT/OT evaluation:  DVT prophylaxis/ GI prophylaxis: Lovenox/Protonix  Disposition: Med/Surg    Imelda Nguyen DO, PGY-1   Attending physician: Dr. Eli Sahu

## 2019-08-28 ENCOUNTER — APPOINTMENT (OUTPATIENT)
Dept: GENERAL RADIOLOGY | Age: 84
DRG: 853 | End: 2019-08-28
Payer: COMMERCIAL

## 2019-08-28 ENCOUNTER — ANESTHESIA (OUTPATIENT)
Dept: OPERATING ROOM | Age: 84
DRG: 853 | End: 2019-08-28
Payer: COMMERCIAL

## 2019-08-28 ENCOUNTER — ANESTHESIA EVENT (OUTPATIENT)
Dept: OPERATING ROOM | Age: 84
DRG: 853 | End: 2019-08-28
Payer: COMMERCIAL

## 2019-08-28 VITALS — DIASTOLIC BLOOD PRESSURE: 83 MMHG | OXYGEN SATURATION: 100 % | TEMPERATURE: 97.5 F | SYSTOLIC BLOOD PRESSURE: 167 MMHG

## 2019-08-28 LAB
AADO2: 125.7 MMHG
ABO/RH: NORMAL
ALBUMIN SERPL-MCNC: 3.2 G/DL (ref 3.5–5.2)
ALBUMIN SERPL-MCNC: 3.3 G/DL (ref 3.5–5.2)
ALP BLD-CCNC: 102 U/L (ref 35–104)
ALP BLD-CCNC: 84 U/L (ref 35–104)
ALT SERPL-CCNC: 7 U/L (ref 0–32)
ALT SERPL-CCNC: 8 U/L (ref 0–32)
ANION GAP SERPL CALCULATED.3IONS-SCNC: 10 MMOL/L (ref 7–16)
ANION GAP SERPL CALCULATED.3IONS-SCNC: 8 MMOL/L (ref 7–16)
ANISOCYTOSIS: ABNORMAL
ANISOCYTOSIS: ABNORMAL
ANTIBODY SCREEN: NORMAL
APTT: 33 SEC (ref 24.5–35.1)
AST SERPL-CCNC: 16 U/L (ref 0–31)
AST SERPL-CCNC: 17 U/L (ref 0–31)
B.E.: -4.8 MMOL/L (ref -3–3)
BASOPHILS ABSOLUTE: 0.06 E9/L (ref 0–0.2)
BASOPHILS ABSOLUTE: 0.15 E9/L (ref 0–0.2)
BASOPHILS RELATIVE PERCENT: 0.2 % (ref 0–2)
BASOPHILS RELATIVE PERCENT: 0.9 % (ref 0–2)
BILIRUB SERPL-MCNC: 0.3 MG/DL (ref 0–1.2)
BILIRUB SERPL-MCNC: 0.3 MG/DL (ref 0–1.2)
BLOOD BANK DISPENSE STATUS: NORMAL
BLOOD BANK DISPENSE STATUS: NORMAL
BLOOD BANK PRODUCT CODE: NORMAL
BLOOD BANK PRODUCT CODE: NORMAL
BPU ID: NORMAL
BPU ID: NORMAL
BUN BLDV-MCNC: 18 MG/DL (ref 8–23)
BUN BLDV-MCNC: 20 MG/DL (ref 8–23)
BURR CELLS: ABNORMAL
C-REACTIVE PROTEIN: 21.3 MG/DL (ref 0–0.4)
CALCIUM IONIZED: 1.15 MMOL/L (ref 1.15–1.33)
CALCIUM SERPL-MCNC: 7.6 MG/DL (ref 8.6–10.2)
CALCIUM SERPL-MCNC: 9 MG/DL (ref 8.6–10.2)
CHLORIDE BLD-SCNC: 101 MMOL/L (ref 98–107)
CHLORIDE BLD-SCNC: 109 MMOL/L (ref 98–107)
CO2: 23 MMOL/L (ref 22–29)
CO2: 30 MMOL/L (ref 22–29)
COHB: 0.3 % (ref 0–1.5)
CREAT SERPL-MCNC: 1 MG/DL (ref 0.5–1)
CREAT SERPL-MCNC: 1.3 MG/DL (ref 0.5–1)
CRITICAL: ABNORMAL
DATE ANALYZED: ABNORMAL
DATE OF COLLECTION: ABNORMAL
DESCRIPTION BLOOD BANK: NORMAL
DESCRIPTION BLOOD BANK: NORMAL
EOSINOPHILS ABSOLUTE: 0 E9/L (ref 0.05–0.5)
EOSINOPHILS ABSOLUTE: 0 E9/L (ref 0.05–0.5)
EOSINOPHILS RELATIVE PERCENT: 0 % (ref 0–6)
EOSINOPHILS RELATIVE PERCENT: 0 % (ref 0–6)
FIO2: 60 %
GFR AFRICAN AMERICAN: 47
GFR AFRICAN AMERICAN: >60
GFR NON-AFRICAN AMERICAN: 47 ML/MIN/1.73
GFR NON-AFRICAN AMERICAN: >60 ML/MIN/1.73
GLUCOSE BLD-MCNC: 119 MG/DL (ref 74–99)
GLUCOSE BLD-MCNC: 191 MG/DL (ref 74–99)
HBA1C MFR BLD: 5.4 % (ref 4–5.6)
HCO3: 20.5 MMOL/L (ref 22–26)
HCT VFR BLD CALC: 23.7 % (ref 34–48)
HCT VFR BLD CALC: 29.5 % (ref 34–48)
HEMOGLOBIN: 7.2 G/DL (ref 11.5–15.5)
HEMOGLOBIN: 9.2 G/DL (ref 11.5–15.5)
HHB: 1 % (ref 0–5)
IMMATURE GRANULOCYTES #: 0.36 E9/L
IMMATURE GRANULOCYTES %: 1.3 % (ref 0–5)
INR BLD: 1.5
INR BLD: 1.6
LAB: ABNORMAL
LACTIC ACID, SEPSIS: 1.6 MMOL/L (ref 0.5–1.9)
LACTIC ACID: 1.1 MMOL/L (ref 0.5–2.2)
LYMPHOCYTES ABSOLUTE: 0.5 E9/L (ref 1.5–4)
LYMPHOCYTES ABSOLUTE: 1.16 E9/L (ref 1.5–4)
LYMPHOCYTES RELATIVE PERCENT: 2.6 % (ref 20–42)
LYMPHOCYTES RELATIVE PERCENT: 4.3 % (ref 20–42)
Lab: ABNORMAL
MAGNESIUM: 1.9 MG/DL (ref 1.6–2.6)
MCH RBC QN AUTO: 27.4 PG (ref 26–35)
MCH RBC QN AUTO: 28 PG (ref 26–35)
MCHC RBC AUTO-ENTMCNC: 30.4 % (ref 32–34.5)
MCHC RBC AUTO-ENTMCNC: 31.2 % (ref 32–34.5)
MCV RBC AUTO: 89.9 FL (ref 80–99.9)
MCV RBC AUTO: 90.1 FL (ref 80–99.9)
METER GLUCOSE: 104 MG/DL (ref 74–99)
METER GLUCOSE: 119 MG/DL (ref 74–99)
METER GLUCOSE: 123 MG/DL (ref 74–99)
METHB: 0.2 % (ref 0–1.5)
MODE: AC
MONOCYTES ABSOLUTE: 0 E9/L (ref 0.1–0.95)
MONOCYTES ABSOLUTE: 0.89 E9/L (ref 0.1–0.95)
MONOCYTES RELATIVE PERCENT: 1.6 % (ref 2–12)
MONOCYTES RELATIVE PERCENT: 3.3 % (ref 2–12)
NEUTROPHILS ABSOLUTE: 16.2 E9/L (ref 1.8–7.3)
NEUTROPHILS ABSOLUTE: 24.53 E9/L (ref 1.8–7.3)
NEUTROPHILS RELATIVE PERCENT: 90.9 % (ref 43–80)
NEUTROPHILS RELATIVE PERCENT: 96.5 % (ref 43–80)
O2 CONTENT: 11.9 ML/DL
O2 SATURATION: 99 % (ref 92–98.5)
O2HB: 98.5 % (ref 94–97)
OPERATOR ID: 7278
OVALOCYTES: ABNORMAL
PATIENT TEMP: 37 C
PCO2: 39.1 MMHG (ref 35–45)
PDW BLD-RTO: 15.8 FL (ref 11.5–15)
PDW BLD-RTO: 15.9 FL (ref 11.5–15)
PEEP/CPAP: 5 CMH2O
PFO2: 4.07 MMHG/%
PH BLOOD GAS: 7.34 (ref 7.35–7.45)
PHOSPHORUS: 2.2 MG/DL (ref 2.5–4.5)
PLATELET # BLD: 186 E9/L (ref 130–450)
PLATELET # BLD: 239 E9/L (ref 130–450)
PMV BLD AUTO: 11 FL (ref 7–12)
PMV BLD AUTO: 11.2 FL (ref 7–12)
PO2: 244.1 MMHG (ref 60–100)
POIKILOCYTES: ABNORMAL
POLYCHROMASIA: ABNORMAL
POLYCHROMASIA: ABNORMAL
POTASSIUM REFLEX MAGNESIUM: 3.8 MMOL/L (ref 3.5–5)
POTASSIUM SERPL-SCNC: 3.4 MMOL/L (ref 3.5–5)
PROCALCITONIN: 5.82 NG/ML (ref 0–0.08)
PROTHROMBIN TIME: 17.2 SEC (ref 9.3–12.4)
PROTHROMBIN TIME: 18.5 SEC (ref 9.3–12.4)
RBC # BLD: 2.63 E12/L (ref 3.5–5.5)
RBC # BLD: 3.28 E12/L (ref 3.5–5.5)
RI(T): 0.51
RR MECHANICAL: 10 B/MIN
SEDIMENTATION RATE, ERYTHROCYTE: 73 MM/HR (ref 0–20)
SODIUM BLD-SCNC: 139 MMOL/L (ref 132–146)
SODIUM BLD-SCNC: 142 MMOL/L (ref 132–146)
SOURCE, BLOOD GAS: ABNORMAL
THB: 8.1 G/DL (ref 11.5–16.5)
TIME ANALYZED: 1940
TOTAL PROTEIN: 5.6 G/DL (ref 6.4–8.3)
TOTAL PROTEIN: 6.6 G/DL (ref 6.4–8.3)
VT MECHANICAL: 400 ML
WBC # BLD: 16.7 E9/L (ref 4.5–11.5)
WBC # BLD: 27 E9/L (ref 4.5–11.5)

## 2019-08-28 PROCEDURE — 2709999900 HC NON-CHARGEABLE SUPPLY: Performed by: SURGERY

## 2019-08-28 PROCEDURE — 3700000001 HC ADD 15 MINUTES (ANESTHESIA): Performed by: SURGERY

## 2019-08-28 PROCEDURE — 83605 ASSAY OF LACTIC ACID: CPT

## 2019-08-28 PROCEDURE — 71045 X-RAY EXAM CHEST 1 VIEW: CPT

## 2019-08-28 PROCEDURE — 82803 BLOOD GASES ANY COMBINATION: CPT

## 2019-08-28 PROCEDURE — 6360000002 HC RX W HCPCS: Performed by: OTOLARYNGOLOGY

## 2019-08-28 PROCEDURE — 3600000014 HC SURGERY LEVEL 4 ADDTL 15MIN: Performed by: SURGERY

## 2019-08-28 PROCEDURE — 87081 CULTURE SCREEN ONLY: CPT

## 2019-08-28 PROCEDURE — P9035 PLATELET PHERES LEUKOREDUCED: HCPCS

## 2019-08-28 PROCEDURE — 2000000000 HC ICU R&B

## 2019-08-28 PROCEDURE — 84100 ASSAY OF PHOSPHORUS: CPT

## 2019-08-28 PROCEDURE — 85651 RBC SED RATE NONAUTOMATED: CPT

## 2019-08-28 PROCEDURE — 6360000002 HC RX W HCPCS: Performed by: NURSE ANESTHETIST, CERTIFIED REGISTERED

## 2019-08-28 PROCEDURE — 86901 BLOOD TYPING SEROLOGIC RH(D): CPT

## 2019-08-28 PROCEDURE — 0DNH0ZZ RELEASE CECUM, OPEN APPROACH: ICD-10-PCS | Performed by: STUDENT IN AN ORGANIZED HEALTH CARE EDUCATION/TRAINING PROGRAM

## 2019-08-28 PROCEDURE — 85730 THROMBOPLASTIN TIME PARTIAL: CPT

## 2019-08-28 PROCEDURE — 36556 INSERT NON-TUNNEL CV CATH: CPT

## 2019-08-28 PROCEDURE — 86850 RBC ANTIBODY SCREEN: CPT

## 2019-08-28 PROCEDURE — P9041 ALBUMIN (HUMAN),5%, 50ML: HCPCS

## 2019-08-28 PROCEDURE — 83735 ASSAY OF MAGNESIUM: CPT

## 2019-08-28 PROCEDURE — 0DNL0ZZ RELEASE TRANSVERSE COLON, OPEN APPROACH: ICD-10-PCS | Performed by: STUDENT IN AN ORGANIZED HEALTH CARE EDUCATION/TRAINING PROGRAM

## 2019-08-28 PROCEDURE — 2500000003 HC RX 250 WO HCPCS: Performed by: SURGERY

## 2019-08-28 PROCEDURE — 6360000002 HC RX W HCPCS

## 2019-08-28 PROCEDURE — 99222 1ST HOSP IP/OBS MODERATE 55: CPT | Performed by: NURSE PRACTITIONER

## 2019-08-28 PROCEDURE — 97606 NEG PRS WND THER DME>50 SQCM: CPT | Performed by: SURGERY

## 2019-08-28 PROCEDURE — 44140 PARTIAL REMOVAL OF COLON: CPT | Performed by: SURGERY

## 2019-08-28 PROCEDURE — 0BH17EZ INSERTION OF ENDOTRACHEAL AIRWAY INTO TRACHEA, VIA NATURAL OR ARTIFICIAL OPENING: ICD-10-PCS | Performed by: ANESTHESIOLOGY

## 2019-08-28 PROCEDURE — 2500000003 HC RX 250 WO HCPCS: Performed by: INTERNAL MEDICINE

## 2019-08-28 PROCEDURE — 2720000010 HC SURG SUPPLY STERILE: Performed by: SURGERY

## 2019-08-28 PROCEDURE — 2500000003 HC RX 250 WO HCPCS: Performed by: STUDENT IN AN ORGANIZED HEALTH CARE EDUCATION/TRAINING PROGRAM

## 2019-08-28 PROCEDURE — P9016 RBC LEUKOCYTES REDUCED: HCPCS

## 2019-08-28 PROCEDURE — 83036 HEMOGLOBIN GLYCOSYLATED A1C: CPT

## 2019-08-28 PROCEDURE — 5A1945Z RESPIRATORY VENTILATION, 24-96 CONSECUTIVE HOURS: ICD-10-PCS | Performed by: STUDENT IN AN ORGANIZED HEALTH CARE EDUCATION/TRAINING PROGRAM

## 2019-08-28 PROCEDURE — 2500000003 HC RX 250 WO HCPCS

## 2019-08-28 PROCEDURE — 82805 BLOOD GASES W/O2 SATURATION: CPT

## 2019-08-28 PROCEDURE — 85610 PROTHROMBIN TIME: CPT

## 2019-08-28 PROCEDURE — 3600000004 HC SURGERY LEVEL 4 BASE: Performed by: SURGERY

## 2019-08-28 PROCEDURE — 2580000003 HC RX 258

## 2019-08-28 PROCEDURE — 6370000000 HC RX 637 (ALT 250 FOR IP): Performed by: STUDENT IN AN ORGANIZED HEALTH CARE EDUCATION/TRAINING PROGRAM

## 2019-08-28 PROCEDURE — 0DNU4ZZ RELEASE OMENTUM, PERCUTANEOUS ENDOSCOPIC APPROACH: ICD-10-PCS | Performed by: STUDENT IN AN ORGANIZED HEALTH CARE EDUCATION/TRAINING PROGRAM

## 2019-08-28 PROCEDURE — 86140 C-REACTIVE PROTEIN: CPT

## 2019-08-28 PROCEDURE — 7100000001 HC PACU RECOVERY - ADDTL 15 MIN

## 2019-08-28 PROCEDURE — 0DNF0ZZ RELEASE RIGHT LARGE INTESTINE, OPEN APPROACH: ICD-10-PCS | Performed by: STUDENT IN AN ORGANIZED HEALTH CARE EDUCATION/TRAINING PROGRAM

## 2019-08-28 PROCEDURE — 82962 GLUCOSE BLOOD TEST: CPT

## 2019-08-28 PROCEDURE — 84145 PROCALCITONIN (PCT): CPT

## 2019-08-28 PROCEDURE — 2580000003 HC RX 258: Performed by: STUDENT IN AN ORGANIZED HEALTH CARE EDUCATION/TRAINING PROGRAM

## 2019-08-28 PROCEDURE — 86923 COMPATIBILITY TEST ELECTRIC: CPT

## 2019-08-28 PROCEDURE — 86900 BLOOD TYPING SEROLOGIC ABO: CPT

## 2019-08-28 PROCEDURE — 85025 COMPLETE CBC W/AUTO DIFF WBC: CPT

## 2019-08-28 PROCEDURE — 6360000002 HC RX W HCPCS: Performed by: STUDENT IN AN ORGANIZED HEALTH CARE EDUCATION/TRAINING PROGRAM

## 2019-08-28 PROCEDURE — 99231 SBSQ HOSP IP/OBS SF/LOW 25: CPT | Performed by: INTERNAL MEDICINE

## 2019-08-28 PROCEDURE — 0DN80ZZ RELEASE SMALL INTESTINE, OPEN APPROACH: ICD-10-PCS | Performed by: STUDENT IN AN ORGANIZED HEALTH CARE EDUCATION/TRAINING PROGRAM

## 2019-08-28 PROCEDURE — 80053 COMPREHEN METABOLIC PANEL: CPT

## 2019-08-28 PROCEDURE — P9059 PLASMA, FRZ BETWEEN 8-24HOUR: HCPCS

## 2019-08-28 PROCEDURE — 44120 REMOVAL OF SMALL INTESTINE: CPT | Performed by: SURGERY

## 2019-08-28 PROCEDURE — 88307 TISSUE EXAM BY PATHOLOGIST: CPT

## 2019-08-28 PROCEDURE — 3700000000 HC ANESTHESIA ATTENDED CARE: Performed by: SURGERY

## 2019-08-28 PROCEDURE — 6360000002 HC RX W HCPCS: Performed by: INTERNAL MEDICINE

## 2019-08-28 PROCEDURE — 37799 UNLISTED PX VASCULAR SURGERY: CPT

## 2019-08-28 PROCEDURE — 2580000003 HC RX 258: Performed by: OTOLARYNGOLOGY

## 2019-08-28 PROCEDURE — 0DTF0ZZ RESECTION OF RIGHT LARGE INTESTINE, OPEN APPROACH: ICD-10-PCS | Performed by: STUDENT IN AN ORGANIZED HEALTH CARE EDUCATION/TRAINING PROGRAM

## 2019-08-28 PROCEDURE — 36415 COLL VENOUS BLD VENIPUNCTURE: CPT

## 2019-08-28 PROCEDURE — 7100000000 HC PACU RECOVERY - FIRST 15 MIN

## 2019-08-28 PROCEDURE — 94002 VENT MGMT INPAT INIT DAY: CPT

## 2019-08-28 PROCEDURE — 82330 ASSAY OF CALCIUM: CPT

## 2019-08-28 PROCEDURE — 02HV33Z INSERTION OF INFUSION DEVICE INTO SUPERIOR VENA CAVA, PERCUTANEOUS APPROACH: ICD-10-PCS | Performed by: SURGERY

## 2019-08-28 PROCEDURE — 99233 SBSQ HOSP IP/OBS HIGH 50: CPT | Performed by: SURGERY

## 2019-08-28 RX ORDER — SUCCINYLCHOLINE/SOD CL,ISO/PF 200MG/10ML
SYRINGE (ML) INTRAVENOUS PRN
Status: DISCONTINUED | OUTPATIENT
Start: 2019-08-28 | End: 2019-08-28 | Stop reason: SDUPTHER

## 2019-08-28 RX ORDER — PROPOFOL 10 MG/ML
10 INJECTION, EMULSION INTRAVENOUS
Status: DISCONTINUED | OUTPATIENT
Start: 2019-08-28 | End: 2019-08-30

## 2019-08-28 RX ORDER — PROPOFOL 10 MG/ML
INJECTION, EMULSION INTRAVENOUS PRN
Status: DISCONTINUED | OUTPATIENT
Start: 2019-08-28 | End: 2019-08-28 | Stop reason: SDUPTHER

## 2019-08-28 RX ORDER — ROCURONIUM BROMIDE 10 MG/ML
INJECTION, SOLUTION INTRAVENOUS PRN
Status: DISCONTINUED | OUTPATIENT
Start: 2019-08-28 | End: 2019-08-28 | Stop reason: SDUPTHER

## 2019-08-28 RX ORDER — DEXAMETHASONE SODIUM PHOSPHATE 10 MG/ML
INJECTION INTRAMUSCULAR; INTRAVENOUS PRN
Status: DISCONTINUED | OUTPATIENT
Start: 2019-08-28 | End: 2019-08-28 | Stop reason: SDUPTHER

## 2019-08-28 RX ORDER — LABETALOL HYDROCHLORIDE 5 MG/ML
INJECTION, SOLUTION INTRAVENOUS PRN
Status: DISCONTINUED | OUTPATIENT
Start: 2019-08-28 | End: 2019-08-28 | Stop reason: SDUPTHER

## 2019-08-28 RX ORDER — ONDANSETRON 2 MG/ML
INJECTION INTRAMUSCULAR; INTRAVENOUS PRN
Status: DISCONTINUED | OUTPATIENT
Start: 2019-08-28 | End: 2019-08-28 | Stop reason: SDUPTHER

## 2019-08-28 RX ORDER — HEPARIN SODIUM 1000 [USP'U]/ML
60 INJECTION, SOLUTION INTRAVENOUS; SUBCUTANEOUS PRN
Status: DISCONTINUED | OUTPATIENT
Start: 2019-08-28 | End: 2019-08-30 | Stop reason: ALTCHOICE

## 2019-08-28 RX ORDER — MINERAL OIL AND WHITE PETROLATUM 150; 830 MG/G; MG/G
OINTMENT OPHTHALMIC
Status: DISCONTINUED | OUTPATIENT
Start: 2019-08-28 | End: 2019-08-31

## 2019-08-28 RX ORDER — ALBUMIN, HUMAN INJ 5% 5 %
SOLUTION INTRAVENOUS PRN
Status: DISCONTINUED | OUTPATIENT
Start: 2019-08-28 | End: 2019-08-28 | Stop reason: SDUPTHER

## 2019-08-28 RX ORDER — LIDOCAINE HYDROCHLORIDE 20 MG/ML
INJECTION, SOLUTION INTRAVENOUS PRN
Status: DISCONTINUED | OUTPATIENT
Start: 2019-08-28 | End: 2019-08-28 | Stop reason: SDUPTHER

## 2019-08-28 RX ORDER — 0.9 % SODIUM CHLORIDE 0.9 %
250 INTRAVENOUS SOLUTION INTRAVENOUS ONCE
Status: COMPLETED | OUTPATIENT
Start: 2019-08-28 | End: 2019-08-29

## 2019-08-28 RX ORDER — SODIUM CHLORIDE 9 MG/ML
INJECTION, SOLUTION INTRAVENOUS CONTINUOUS PRN
Status: DISCONTINUED | OUTPATIENT
Start: 2019-08-28 | End: 2019-08-28 | Stop reason: SDUPTHER

## 2019-08-28 RX ORDER — MORPHINE SULFATE 2 MG/ML
2 INJECTION, SOLUTION INTRAMUSCULAR; INTRAVENOUS EVERY 5 MIN PRN
Status: DISCONTINUED | OUTPATIENT
Start: 2019-08-28 | End: 2019-08-28 | Stop reason: HOSPADM

## 2019-08-28 RX ORDER — HEPARIN SODIUM 1000 [USP'U]/ML
30 INJECTION, SOLUTION INTRAVENOUS; SUBCUTANEOUS PRN
Status: DISCONTINUED | OUTPATIENT
Start: 2019-08-28 | End: 2019-08-30 | Stop reason: ALTCHOICE

## 2019-08-28 RX ORDER — SODIUM CHLORIDE, SODIUM LACTATE, POTASSIUM CHLORIDE, CALCIUM CHLORIDE 600; 310; 30; 20 MG/100ML; MG/100ML; MG/100ML; MG/100ML
INJECTION, SOLUTION INTRAVENOUS CONTINUOUS
Status: DISCONTINUED | OUTPATIENT
Start: 2019-08-28 | End: 2019-08-29

## 2019-08-28 RX ORDER — CHLORHEXIDINE GLUCONATE 0.12 MG/ML
15 RINSE ORAL 2 TIMES DAILY
Status: DISCONTINUED | OUTPATIENT
Start: 2019-08-28 | End: 2019-08-31

## 2019-08-28 RX ORDER — HYDRALAZINE HYDROCHLORIDE 20 MG/ML
INJECTION INTRAMUSCULAR; INTRAVENOUS PRN
Status: DISCONTINUED | OUTPATIENT
Start: 2019-08-28 | End: 2019-08-28 | Stop reason: SDUPTHER

## 2019-08-28 RX ORDER — POTASSIUM CHLORIDE 29.8 MG/ML
20 INJECTION INTRAVENOUS
Status: COMPLETED | OUTPATIENT
Start: 2019-08-28 | End: 2019-08-28

## 2019-08-28 RX ORDER — LATANOPROST 50 UG/ML
1 SOLUTION/ DROPS OPHTHALMIC NIGHTLY
Status: DISCONTINUED | OUTPATIENT
Start: 2019-08-28 | End: 2019-09-06 | Stop reason: HOSPADM

## 2019-08-28 RX ORDER — FLUCONAZOLE 2 MG/ML
400 INJECTION, SOLUTION INTRAVENOUS EVERY 24 HOURS
Status: DISCONTINUED | OUTPATIENT
Start: 2019-08-28 | End: 2019-08-31

## 2019-08-28 RX ORDER — BUPIVACAINE HYDROCHLORIDE AND EPINEPHRINE 2.5; 5 MG/ML; UG/ML
INJECTION, SOLUTION EPIDURAL; INFILTRATION; INTRACAUDAL; PERINEURAL PRN
Status: DISCONTINUED | OUTPATIENT
Start: 2019-08-28 | End: 2019-08-28 | Stop reason: ALTCHOICE

## 2019-08-28 RX ORDER — VERAPAMIL HYDROCHLORIDE 80 MG/1
80 TABLET ORAL EVERY 8 HOURS SCHEDULED
Status: DISCONTINUED | OUTPATIENT
Start: 2019-08-28 | End: 2019-08-31

## 2019-08-28 RX ORDER — PROPOFOL 10 MG/ML
INJECTION, EMULSION INTRAVENOUS
Status: COMPLETED
Start: 2019-08-28 | End: 2019-08-28

## 2019-08-28 RX ORDER — HEPARIN SODIUM 10000 [USP'U]/100ML
12 INJECTION, SOLUTION INTRAVENOUS CONTINUOUS
Status: DISCONTINUED | OUTPATIENT
Start: 2019-08-28 | End: 2019-08-30

## 2019-08-28 RX ORDER — MORPHINE SULFATE 2 MG/ML
1 INJECTION, SOLUTION INTRAMUSCULAR; INTRAVENOUS EVERY 5 MIN PRN
Status: DISCONTINUED | OUTPATIENT
Start: 2019-08-28 | End: 2019-08-28 | Stop reason: HOSPADM

## 2019-08-28 RX ORDER — FENTANYL CITRATE 50 UG/ML
INJECTION, SOLUTION INTRAMUSCULAR; INTRAVENOUS PRN
Status: DISCONTINUED | OUTPATIENT
Start: 2019-08-28 | End: 2019-08-28 | Stop reason: SDUPTHER

## 2019-08-28 RX ADMIN — FENTANYL CITRATE 25 MCG: 50 INJECTION, SOLUTION INTRAMUSCULAR; INTRAVENOUS at 15:32

## 2019-08-28 RX ADMIN — SODIUM CHLORIDE, POTASSIUM CHLORIDE, SODIUM LACTATE AND CALCIUM CHLORIDE: 600; 310; 30; 20 INJECTION, SOLUTION INTRAVENOUS at 20:13

## 2019-08-28 RX ADMIN — PROPOFOL 10 MCG/KG/MIN: 10 INJECTION, EMULSION INTRAVENOUS at 20:07

## 2019-08-28 RX ADMIN — ROCURONIUM BROMIDE 30 MG: 10 INJECTION, SOLUTION INTRAVENOUS at 14:24

## 2019-08-28 RX ADMIN — Medication 80 MG: at 14:15

## 2019-08-28 RX ADMIN — LABETALOL HYDROCHLORIDE 2.5 MG: 5 INJECTION INTRAVENOUS at 16:15

## 2019-08-28 RX ADMIN — Medication 10 ML: at 21:47

## 2019-08-28 RX ADMIN — SODIUM CHLORIDE: 9 INJECTION, SOLUTION INTRAVENOUS at 17:22

## 2019-08-28 RX ADMIN — ROCURONIUM BROMIDE 5 MG: 10 INJECTION, SOLUTION INTRAVENOUS at 16:28

## 2019-08-28 RX ADMIN — SODIUM CHLORIDE 250 ML: 9 INJECTION, SOLUTION INTRAVENOUS at 21:29

## 2019-08-28 RX ADMIN — PROPOFOL 50 MG: 10 INJECTION, EMULSION INTRAVENOUS at 14:15

## 2019-08-28 RX ADMIN — POTASSIUM CHLORIDE 20 MEQ: 29.8 INJECTION, SOLUTION INTRAVENOUS at 23:00

## 2019-08-28 RX ADMIN — FENTANYL CITRATE 50 MCG: 50 INJECTION, SOLUTION INTRAMUSCULAR; INTRAVENOUS at 15:52

## 2019-08-28 RX ADMIN — ALBUMIN (HUMAN) 500 ML: 12.5 INJECTION, SOLUTION INTRAVENOUS at 15:40

## 2019-08-28 RX ADMIN — POTASSIUM PHOSPHATE, MONOBASIC AND POTASSIUM PHOSPHATE, DIBASIC 30 MMOL: 224; 236 INJECTION, SOLUTION, CONCENTRATE INTRAVENOUS at 22:03

## 2019-08-28 RX ADMIN — LIDOCAINE HYDROCHLORIDE 60 MG: 20 INJECTION, SOLUTION INTRAVENOUS at 14:15

## 2019-08-28 RX ADMIN — LATANOPROST 1 DROP: 50 SOLUTION OPHTHALMIC at 21:46

## 2019-08-28 RX ADMIN — METRONIDAZOLE 500 MG: 500 INJECTION, SOLUTION INTRAVENOUS at 13:04

## 2019-08-28 RX ADMIN — DEXAMETHASONE SODIUM PHOSPHATE 10 MG: 10 INJECTION INTRAMUSCULAR; INTRAVENOUS at 14:37

## 2019-08-28 RX ADMIN — CEFTRIAXONE SODIUM 1 G: 1 INJECTION, POWDER, FOR SOLUTION INTRAMUSCULAR; INTRAVENOUS at 13:03

## 2019-08-28 RX ADMIN — FENTANYL CITRATE 25 MCG: 50 INJECTION, SOLUTION INTRAMUSCULAR; INTRAVENOUS at 15:31

## 2019-08-28 RX ADMIN — PIPERACILLIN SODIUM AND TAZOBACTAM SODIUM 3.38 G: 3; .375 INJECTION, POWDER, LYOPHILIZED, FOR SOLUTION INTRAVENOUS at 21:30

## 2019-08-28 RX ADMIN — Medication 25 MCG/HR: at 21:19

## 2019-08-28 RX ADMIN — FENTANYL CITRATE 50 MCG: 50 INJECTION, SOLUTION INTRAMUSCULAR; INTRAVENOUS at 14:58

## 2019-08-28 RX ADMIN — ONDANSETRON HYDROCHLORIDE 4 MG: 2 INJECTION, SOLUTION INTRAMUSCULAR; INTRAVENOUS at 14:37

## 2019-08-28 RX ADMIN — POTASSIUM CHLORIDE 20 MEQ: 29.8 INJECTION, SOLUTION INTRAVENOUS at 21:35

## 2019-08-28 RX ADMIN — HYDRALAZINE HYDROCHLORIDE 5 MG: 20 INJECTION INTRAMUSCULAR; INTRAVENOUS at 17:36

## 2019-08-28 RX ADMIN — METRONIDAZOLE 500 MG: 500 INJECTION, SOLUTION INTRAVENOUS at 05:31

## 2019-08-28 RX ADMIN — SODIUM CHLORIDE: 9 INJECTION, SOLUTION INTRAVENOUS at 14:10

## 2019-08-28 RX ADMIN — CHLORHEXIDINE GLUCONATE 0.12% ORAL RINSE 15 ML: 1.2 LIQUID ORAL at 22:20

## 2019-08-28 RX ADMIN — LABETALOL HYDROCHLORIDE 2.5 MG: 5 INJECTION INTRAVENOUS at 16:05

## 2019-08-28 RX ADMIN — ENOXAPARIN SODIUM 40 MG: 40 INJECTION SUBCUTANEOUS at 08:42

## 2019-08-28 RX ADMIN — ROCURONIUM BROMIDE 5 MG: 10 INJECTION, SOLUTION INTRAVENOUS at 17:05

## 2019-08-28 RX ADMIN — HEPARIN SODIUM 12 UNITS/KG/HR: 10000 INJECTION, SOLUTION INTRAVENOUS at 21:15

## 2019-08-28 RX ADMIN — FENTANYL CITRATE 50 MCG: 50 INJECTION, SOLUTION INTRAMUSCULAR; INTRAVENOUS at 14:55

## 2019-08-28 RX ADMIN — FENTANYL CITRATE 50 MCG: 50 INJECTION, SOLUTION INTRAMUSCULAR; INTRAVENOUS at 14:15

## 2019-08-28 RX ADMIN — ROCURONIUM BROMIDE 5 MG: 10 INJECTION, SOLUTION INTRAVENOUS at 17:36

## 2019-08-28 ASSESSMENT — PAIN SCALES - GENERAL: PAINLEVEL_OUTOF10: 0

## 2019-08-28 ASSESSMENT — LIFESTYLE VARIABLES: SMOKING_STATUS: 0

## 2019-08-28 ASSESSMENT — PULMONARY FUNCTION TESTS: PIF_VALUE: 18

## 2019-08-28 NOTE — CONSULTS
activity: None   Other Topics Concern    None   Social History Narrative    None     Tobacco: No  Alcohol: No  Pets: No  Travel: No    Family History:       Problem Relation Age of Onset    Arthritis Sister     Arthritis Maternal Aunt    . Otherwise non-pertinent to the chief complaint. REVIEW OF SYSTEMS:    CONSTITUTIONAL:  No chills, fevers or night sweats. No loss of weight. EYES:  No double vision or drainage from eyes, ears or throat. HEENT:  No neck stiffness. No dysphagia. No drainage from eyes, ears or throat  RESPIRATORY:  No cough, productive sputum or hemoptysis. CARDIOVASCULAR:  No chest pain, palpitations, orthopnea or dyspnea on exertion. GASTROINTESTINAL:  + nausea, vomiting, diarrhea,+hematochezia   GENITOURINARY:  No frequency burning dysuria or hematuria. INTEGUMENT/BREAST:  No rash or breast masses. HEMATOLOGIC/LYMPHATIC:  No lymphadenopathy or blood dyscrasics. ALLERGIC/IMMUNOLOGIC:  No anaphylaxis. ENDOCRINE:  No polyuria or polydipsia or temperature intolerance. MUSCULOSKELETAL:  No myalgia or arthralgia. Full ROM. NEUROLOGICAL:  No focal motor sensory deficit. BEHAVIOR/PSYCH:  No psychosis. PHYSICAL EXAM:    Vitals:    /62   Pulse 88   Temp 97.2 °F (36.2 °C) (Temporal)   Resp 16   Ht 5' 3\" (1.6 m)   Wt 101 lb 5 oz (46 kg)   SpO2 93%   BMI 17.95 kg/m²   Constitutional: The patient is awake, alert, and oriented. But cachectic  Skin: Warm and dry. No rashes were noted. HEENT: Eyes show round, and reactive pupils. No jaundice. Moist mucous membranes, no ulcerations, no thrush. Neck: Supple to movements. No lymphadenopathy. Chest: No use of accessory muscles to breathe. Symmetrical expansion. Auscultation reveals no wheezing, crackles, or rhonchi. Cardiovascular: S1 and S2 are rhythmic and regular. No murmurs appreciated. Abdomen: Positive bowel sounds to auscultation. To palpation especially left lower quadrant. No masses felt.  No

## 2019-08-28 NOTE — ANESTHESIA PROCEDURE NOTES
Arterial Line:    An arterial line was placed using ultrasound guidance, in the OR for the following indication(s): continuous blood pressure monitoring and blood sampling needed. A 20 gauge (size), 1 and 3/4 inch (length), (type) catheter was placed, Seldinger technique used, into the right radial artery, secured by tape and Tegaderm. Anesthesia type: General    Events:  patient tolerated procedure well with no complications. Anesthesiologist: Kevin Evangelista DO  Resident/CRNA: YAS Valladares - KIRSTIE  Other anesthesia staff: Chioma Sterling RN  Performed:  Other anesthesia staff   Preanesthetic Checklist  Completed: patient identified, site marked, surgical consent, pre-op evaluation, timeout performed, IV checked, risks and benefits discussed, monitors and equipment checked, anesthesia consent given, oxygen available and patient being monitored

## 2019-08-28 NOTE — ANESTHESIA PRE PROCEDURE
Date of last liquid consumption: 08/27/19                        Date of last solid food consumption: 08/27/19    BMI:   Wt Readings from Last 3 Encounters:   08/28/19 101 lb 5 oz (46 kg)   07/13/19 118 lb (53.5 kg)   07/06/19 118 lb (53.5 kg)     Body mass index is 17.95 kg/m². CBC:   Lab Results   Component Value Date    WBC 27.0 08/28/2019    RBC 3.28 08/28/2019    HGB 9.2 08/28/2019    HCT 29.5 08/28/2019    MCV 89.9 08/28/2019    RDW 15.8 08/28/2019     08/28/2019       CMP:   Lab Results   Component Value Date     08/28/2019    K 3.8 08/28/2019     08/28/2019    CO2 30 08/28/2019    BUN 20 08/28/2019    CREATININE 1.3 08/28/2019    GFRAA 47 08/28/2019    LABGLOM 47 08/28/2019    GLUCOSE 119 08/28/2019    GLUCOSE 121 11/29/2011    PROT 6.6 08/28/2019    CALCIUM 9.0 08/28/2019    BILITOT 0.3 08/28/2019    ALKPHOS 102 08/28/2019    AST 16 08/28/2019    ALT 7 08/28/2019       POC Tests: No results for input(s): POCGLU, POCNA, POCK, POCCL, POCBUN, POCHEMO, POCHCT in the last 72 hours.     Coags:   Lab Results   Component Value Date    PROTIME 17.2 08/28/2019    PROTIME 11.4 11/28/2011    INR 1.5 08/28/2019    APTT 25.0 07/06/2019       HCG (If Applicable): No results found for: PREGTESTUR, PREGSERUM, HCG, HCGQUANT     ABGs: No results found for: PHART, PO2ART, NNE1IVB, CXU4ERX, BEART, V8FVBNVJ     Type & Screen (If Applicable):  No results found for: LABABO, [address]    Anesthesia Evaluation  Patient summary reviewed and Nursing notes reviewed no history of anesthetic complications:   Airway: Mallampati: II  TM distance: >3 FB   Neck ROM: full  Mouth opening: > = 3 FB Dental:    (+) edentulous, lower dentures and upper dentures      Pulmonary:normal exam  breath sounds clear to auscultation  (+) COPD:      (-) not a current smoker (quit 2014)                           Cardiovascular:    (+) hypertension:, valvular problems/murmurs: MR,       ECG reviewed  Rhythm: regular  Rate:

## 2019-08-28 NOTE — PROGRESS NOTES
Eva Leung,    Your patient is on a medication that requires a renal dose adjustment. Renal Function Assessment:    Date Body Weight IBW Scr CrCl Dialysis status   8/28/2019 46 kg 52.4 kg 1.3 24 ml/min N/A       Pharmacy has renally dose-adjusted the following medication(s):    Date Medication Original Dosing Regimen New Dosing Regimen   8/28/2019 enoxaparin 40 mg daily  30 mg daily            These changes were made per protocol according to the [facility] Renal Function-Based Dose Adjustments Policy    *Please note this dose may need readjusted if your patient's renal function significantly improves. Please contact pharmacy with any questions regarding these changes.     Sondra Eaton, PharmD 8/28/2019 11:47 AM

## 2019-08-28 NOTE — CONSULTS
femoral +2 L femoral +2   R popliteal +1 L popliteal +1   R posterior tibial  L posterior tibial    R dorsalis pedis  L dorsalis pedis        LABS:    Lab Results   Component Value Date    WBC 27.0 (H) 2019    HGB 9.2 (L) 2019    HCT 29.5 (L) 2019     2019    PROTIME 17.2 (H) 2019    INR 1.5 2019    K 3.8 2019    BUN 20 2019    CREATININE 1.3 (H) 2019       RADIOLOGY:  Ct Abdomen Pelvis Wo Contrast    Result Date: 2019  Patient MRN:  [id number] : 1935 Age: 81 years Gender: Female Order Date:  2019 9:00 AM EXAM: CT ABDOMEN PELVIS WO CONTRAST number of images 296 Technique: Low-dose CT  acquisition technique included one of following options; 1 . Automated exposure control, 2. Adjustment of MA and or KV according to patient's size or 3. Use of iterative reconstruction. INDICATION:  abd pain  COMPARISON: 2019 FINDINGS: The examination is limited due to the lack of contrast. The lung bases demonstrate scarring and mild  COPD. Small hiatal hernia is present. Liver is decreased in attenuation likely fatty infiltrated. Branching pattern of air is identified in the peripheral liver concerning for portal venous air. There is previous cholecystectomy. Spleen, pancreas, the adrenals and the kidneys are normal. There is significant calcification of aorta and iliac arteries. Severe compression deformity of L2 with the retropulsion of bony fragments with  compromise of spinal canal is noted without significant change. Pelvis. Bladder is distended. There is inflammation and thickening of the cecum and ascending colon with a fluid-filled dilated bowel loops in the right lower quadrant. There is air collection and air bubbles in the periphery of the bowel loops in the right lower quadrant concerning for pneumatosis of bowel loops/colon.      Inflammatory changes with wall thickening and edema in the cecum and ascending colon as well as a dilated bowel

## 2019-08-28 NOTE — CARE COORDINATION
8/28/2019 social work:discharge plan  Initial assessment attempted but patient sleeping, will attempt later

## 2019-08-28 NOTE — PROGRESS NOTES
[facility]  Internal Medicine Residency Program  Progress Note - House Team 1    Patient:  Joselin Moody 81 y.o. female   MRN: [id number]       Date of Service: 2019    Allergy: Patient has no known allergies. CC: had concerns including Abdominal Pain (for the past 6 months, nothing has changed) and Nausea (pt noted to be sticking her finger down her throat to make herself vomit). Overnight events: Patient was non-compliant throughout the night. She was screaming at nursing staff. Refused monitoring and ripped out the IV. Subjective   Joselin Moody is a 81 y.o. female on day 1 of hospital admission for   Chief Complaint   Patient presents with    Abdominal Pain     for the past 6 months, nothing has changed    Nausea     pt noted to be sticking her finger down her throat to make herself vomit     Patient was seen this AM at bedside. She was lethargic but respond to questions appropriately. She was an IV placed into her right arm. She was able to answer questions this morning and rated the pain of her abdomen 8/10. Her lactic acid is trending down from 1.6 to 1.1. Her procalcitonin was 5.82. WBC luther from  12.9 to 27.0. I counseled the patient on performing surgery for life-saving measures. She is now agreeable to surgery. Objective     TEMPERATURE:  Current - Temp: 99.9 °F (37.7 °C); Max - Temp  Av.5 °F (36.9 °C)  Min: 97.6 °F (36.4 °C)  Max: 99.9 °F (37.7 °C)  RESPIRATIONS RANGE: Resp  Av.9  Min: 15  Max: 20  PULSE RANGE: Pulse  Av.6  Min: 93  Max: 104  BLOOD PRESSURE RANGE:  Systolic (38KKY), BOP:625 , Min:125 , YJ   ; Diastolic (19VCW), BF, Min:65, Max:81    PULSE OXIMETRY RANGE: SpO2  Av.5 %  Min: 91 %  Max: 97 %    I & O - 24hr:    Intake/Output Summary (Last 24 hours) at 2019 0966  Last data filed at 2019 0641  Gross per 24 hour   Intake 1294 ml   Output 0 ml   Net 1294 ml     I/O last 3 completed shifts:   In: 5251 [I.V.:194; right lower quadrant concerning for pneumatosis of bowel loops/colon. Inflammatory changes with wall thickening and edema in the cecum and ascending colon as well as a dilated bowel loop in the right lower quadrant with the suggestion of pneumatosis. There is also suggestion of portal venous air concerning for severe inflammation. Consideration should be given to severe enterocolitis in the right lower quadrant. Examination is otherwise limited due to lack of contrast. ALERT:  THIS IS AN ABNORMAL REPORT         Medications     Continuous Infusions:   dextrose 5 % and 0.45 % NaCl 100 mL/hr at 08/27/19 2142    dextrose       Scheduled Meds:   cefTRIAXone (ROCEPHIN) IV  1 g Intravenous Q24H    metroNIDAZOLE  500 mg Intravenous Q8H    verapamil  240 mg Oral Nightly    sodium chloride flush  10 mL Intravenous 2 times per day    enoxaparin  40 mg Subcutaneous Daily    pantoprazole  40 mg Oral QAM AC     PRN Meds: sodium chloride flush, potassium chloride **OR** potassium alternative oral replacement **OR** potassium chloride, magnesium hydroxide, trimethobenzamide, acetaminophen, glucose, dextrose, glucagon (rDNA), dextrose, labetalol, morphine      Assessment and Plan      Zachary Kat is a 81 y.o. female who presents with abdominal pain and nausea.     1. Pneumatosis Intestinalis              - previous complaints of abdominal discomfort, worse over past 2-3 days. - CT ABD suggestive of pneumatosis intestinalis; inflammatory changes with wall thickening and edema in the cecum and ascending colon as well as dilated bowel loop in the RLQ              - Seen by general surgery(8/27/2019); recommended general laparoscopy -> patient refused surgical intervention -> Gen Surg to follow              -Lactic acidosis 1.6 -> repeat lactic acid was 1.1.   Continue to follow              - Continure Flagyl              -NPO              -tylenol for pain control   -In the past 24 hours the patient's

## 2019-08-29 ENCOUNTER — APPOINTMENT (OUTPATIENT)
Dept: GENERAL RADIOLOGY | Age: 84
DRG: 853 | End: 2019-08-29
Payer: COMMERCIAL

## 2019-08-29 LAB
AADO2: 10.5 MMHG
AADO2: 139 MMHG
AADO2: 29.6 MMHG
AADO2: 9.5 MMHG
ALBUMIN SERPL-MCNC: 2.5 G/DL (ref 3.5–5.2)
ALP BLD-CCNC: 71 U/L (ref 35–104)
ALT SERPL-CCNC: 11 U/L (ref 0–32)
ANION GAP SERPL CALCULATED.3IONS-SCNC: 14 MMOL/L (ref 7–16)
ANION GAP SERPL CALCULATED.3IONS-SCNC: 18 MMOL/L (ref 7–16)
ANION GAP SERPL CALCULATED.3IONS-SCNC: 8 MMOL/L (ref 7–16)
ANISOCYTOSIS: ABNORMAL
APTT: 135.8 SEC (ref 24.5–35.1)
APTT: 214.4 SEC (ref 24.5–35.1)
APTT: 78.2 SEC (ref 24.5–35.1)
AST SERPL-CCNC: 26 U/L (ref 0–31)
B.E.: -15.7 MMOL/L (ref -3–3)
B.E.: -16.4 MMOL/L (ref -3–3)
B.E.: -3.1 MMOL/L (ref -3–3)
B.E.: -8.4 MMOL/L (ref -3–3)
B.E.: 2.1 MMOL/L (ref -3–0)
BASOPHILS ABSOLUTE: 0 E9/L (ref 0–0.2)
BASOPHILS RELATIVE PERCENT: 0 % (ref 0–2)
BILIRUB SERPL-MCNC: 0.7 MG/DL (ref 0–1.2)
BLOOD BANK DISPENSE STATUS: NORMAL
BLOOD BANK DISPENSE STATUS: NORMAL
BLOOD BANK PRODUCT CODE: NORMAL
BLOOD BANK PRODUCT CODE: NORMAL
BPU ID: NORMAL
BPU ID: NORMAL
BUN BLDV-MCNC: 24 MG/DL (ref 8–23)
BURR CELLS: ABNORMAL
CALCIUM IONIZED: 1.08 MMOL/L (ref 1.15–1.33)
CALCIUM SERPL-MCNC: 5.5 MG/DL (ref 8.6–10.2)
CALCIUM SERPL-MCNC: 6.8 MG/DL (ref 8.6–10.2)
CALCIUM SERPL-MCNC: 7.3 MG/DL (ref 8.6–10.2)
CARDIOPULMONARY BYPASS: NO
CHLORIDE BLD-SCNC: 106 MMOL/L (ref 98–107)
CHLORIDE BLD-SCNC: 108 MMOL/L (ref 98–107)
CHLORIDE BLD-SCNC: 111 MMOL/L (ref 98–107)
CHLORIDE URINE RANDOM: 62 MMOL/L
CO2: 13 MMOL/L (ref 22–29)
CO2: 18 MMOL/L (ref 22–29)
CO2: 22 MMOL/L (ref 22–29)
COHB: 0.3 % (ref 0–1.5)
COHB: 0.7 % (ref 0–1.5)
COMMENT: ABNORMAL
CORTISOL TOTAL: 13.37 MCG/DL (ref 2.68–18.4)
CREAT SERPL-MCNC: 1.4 MG/DL (ref 0.5–1)
CREAT SERPL-MCNC: 1.4 MG/DL (ref 0.5–1)
CREAT SERPL-MCNC: 1.5 MG/DL (ref 0.5–1)
CREATININE URINE: 87 MG/DL (ref 29–226)
CRITICAL: ABNORMAL
DATE ANALYZED: ABNORMAL
DATE OF COLLECTION: ABNORMAL
DESCRIPTION BLOOD BANK: NORMAL
DESCRIPTION BLOOD BANK: NORMAL
DEVICE: ABNORMAL
EOSINOPHILS ABSOLUTE: 0 E9/L (ref 0.05–0.5)
EOSINOPHILS RELATIVE PERCENT: 0 % (ref 0–6)
FIO2: 40 %
FIO2: 60 %
GFR AFRICAN AMERICAN: 40
GFR AFRICAN AMERICAN: 43
GFR AFRICAN AMERICAN: 43
GFR NON-AFRICAN AMERICAN: 40 ML/MIN/1.73
GFR NON-AFRICAN AMERICAN: 43 ML/MIN/1.73
GFR NON-AFRICAN AMERICAN: 43 ML/MIN/1.73
GLUCOSE BLD-MCNC: 170 MG/DL (ref 74–99)
GLUCOSE BLD-MCNC: 304 MG/DL (ref 74–99)
GLUCOSE BLD-MCNC: 346 MG/DL (ref 74–99)
HCO3 ARTERIAL: 27 MMOL/L (ref 22–26)
HCO3: 12.4 MMOL/L (ref 22–26)
HCO3: 12.7 MMOL/L (ref 22–26)
HCO3: 17.4 MMOL/L (ref 22–26)
HCO3: 20.4 MMOL/L (ref 22–26)
HCT VFR BLD CALC: 18.9 % (ref 34–48)
HCT VFR BLD CALC: 23.1 % (ref 34–48)
HCT VFR BLD CALC: 26.5 % (ref 34–48)
HCT VFR BLD CALC: 32.6 % (ref 34–48)
HCT,ARTERIAL: 27 % (ref 34–48)
HEMOGLOBIN: 10.4 G/DL (ref 11.5–15.5)
HEMOGLOBIN: 5.7 G/DL (ref 11.5–15.5)
HEMOGLOBIN: 7.6 G/DL (ref 11.5–15.5)
HEMOGLOBIN: 9 G/DL (ref 11.5–15.5)
HGB, ARTERIAL: 9.1 G/DL (ref 11.5–15.5)
HHB: 1.6 % (ref 0–5)
HHB: 1.6 % (ref 0–5)
HHB: 1.7 % (ref 0–5)
HHB: 1.8 % (ref 0–5)
INR BLD: 1.6
LAB: ABNORMAL
LACTIC ACID, SEPSIS: 2.9 MMOL/L (ref 0.5–1.9)
LYMPHOCYTES ABSOLUTE: 0.3 E9/L (ref 1.5–4)
LYMPHOCYTES RELATIVE PERCENT: 2 % (ref 20–42)
Lab: ABNORMAL
MAGNESIUM: 2.1 MG/DL (ref 1.6–2.6)
MCH RBC QN AUTO: 28.8 PG (ref 26–35)
MCHC RBC AUTO-ENTMCNC: 31.9 % (ref 32–34.5)
MCV RBC AUTO: 90.3 FL (ref 80–99.9)
METER GLUCOSE: 118 MG/DL (ref 74–99)
METER GLUCOSE: 202 MG/DL (ref 74–99)
METHB: 0.3 % (ref 0–1.5)
METHB: 0.3 % (ref 0–1.5)
METHB: 0.4 % (ref 0–1.5)
METHB: 0.9 % (ref 0–1.5)
MODE: AC
MONOCYTES ABSOLUTE: 0.15 E9/L (ref 0.1–0.95)
MONOCYTES RELATIVE PERCENT: 1 % (ref 2–12)
NEUTROPHILS ABSOLUTE: 14.74 E9/L (ref 1.8–7.3)
NEUTROPHILS RELATIVE PERCENT: 97 % (ref 43–80)
O2 CONTENT: 12 ML/DL
O2 CONTENT: 15.9 ML/DL
O2 CONTENT: 16 ML/DL
O2 CONTENT: 9.5 ML/DL
O2 SATURATION: 98.2 % (ref 92–98.5)
O2 SATURATION: 98.3 % (ref 92–98.5)
O2 SATURATION: 98.4 % (ref 92–98.5)
O2 SATURATION: 98.4 % (ref 92–98.5)
O2 SATURATION: 99.9 % (ref 92–98.5)
O2HB: 96.6 % (ref 94–97)
O2HB: 97.7 % (ref 94–97)
O2HB: 97.7 % (ref 94–97)
O2HB: 97.8 % (ref 94–97)
OPERATOR ID: 2464
OPERATOR ID: 2464
OPERATOR ID: 2593
OPERATOR ID: ABNORMAL
OPERATOR ID: ABNORMAL
PATIENT TEMP: 37 C
PCO2 ARTERIAL: 42.7 MMHG (ref 35–45)
PCO2: 29.6 MMHG (ref 35–45)
PCO2: 37.2 MMHG (ref 35–45)
PCO2: 39.2 MMHG (ref 35–45)
PCO2: 40.6 MMHG (ref 35–45)
PDW BLD-RTO: 15.3 FL (ref 11.5–15)
PEEP/CPAP: 5 CMH2O
PEEP/CPAP: 7 CMH2O
PFO2: 4.02 MMHG/%
PFO2: 5.07 MMHG/%
PFO2: 5.47 MMHG/%
PFO2: 5.49 MMHG/%
PH BLOOD GAS: 7.1 (ref 7.35–7.45)
PH BLOOD GAS: 7.13 (ref 7.35–7.45)
PH BLOOD GAS: 7.29 (ref 7.35–7.45)
PH BLOOD GAS: 7.41 (ref 7.35–7.45)
PH BLOOD GAS: 7.46 (ref 7.35–7.45)
PHOSPHORUS: 7.3 MG/DL (ref 2.5–4.5)
PLATELET # BLD: 127 E9/L (ref 130–450)
PMV BLD AUTO: 10.8 FL (ref 7–12)
PO2 ARTERIAL: 350.2 MMHG (ref 60–80)
PO2: 202.8 MMHG (ref 60–100)
PO2: 219 MMHG (ref 60–100)
PO2: 219.6 MMHG (ref 60–100)
PO2: 241.2 MMHG (ref 60–100)
POIKILOCYTES: ABNORMAL
POTASSIUM SERPL-SCNC: 3.1 MMOL/L (ref 3.5–5.5)
POTASSIUM SERPL-SCNC: 4.1 MMOL/L (ref 3.5–5)
POTASSIUM SERPL-SCNC: 4.9 MMOL/L (ref 3.5–5)
POTASSIUM SERPL-SCNC: 5.5 MMOL/L (ref 3.5–5)
POTASSIUM, UR: 42.3 MMOL/L
PROTHROMBIN TIME: 18.2 SEC (ref 9.3–12.4)
RBC # BLD: 3.61 E12/L (ref 3.5–5.5)
RI(T): 0.04
RI(T): 0.05
RI(T): 0.15
RI(T): 0.58
RR MECHANICAL: 10 B/MIN
SCHISTOCYTES: ABNORMAL
SODIUM BLD-SCNC: 137 MMOL/L (ref 132–146)
SODIUM BLD-SCNC: 140 MMOL/L (ref 132–146)
SODIUM BLD-SCNC: 141 MMOL/L (ref 132–146)
SODIUM URINE: 63 MMOL/L
SOURCE, BLOOD GAS: ABNORMAL
THB: 11.2 G/DL (ref 11.5–16.5)
THB: 11.3 G/DL (ref 11.5–16.5)
THB: 6.5 G/DL (ref 11.5–16.5)
THB: 8.4 G/DL (ref 11.5–16.5)
TIME ANALYZED: 1231
TIME ANALYZED: 2131
TIME ANALYZED: 630
TIME ANALYZED: 636
TOTAL PROTEIN: 4.8 G/DL (ref 6.4–8.3)
UREA NITROGEN, UR: 467 MG/DL (ref 800–1666)
VT MECHANICAL: 400 ML
WBC # BLD: 15.2 E9/L (ref 4.5–11.5)

## 2019-08-29 PROCEDURE — 36245 INS CATH ABD/L-EXT ART 1ST: CPT | Performed by: SURGERY

## 2019-08-29 PROCEDURE — 83605 ASSAY OF LACTIC ACID: CPT

## 2019-08-29 PROCEDURE — 2500000003 HC RX 250 WO HCPCS

## 2019-08-29 PROCEDURE — C1876 STENT, NON-COA/NON-COV W/DEL: HCPCS

## 2019-08-29 PROCEDURE — C9113 INJ PANTOPRAZOLE SODIUM, VIA: HCPCS | Performed by: STUDENT IN AN ORGANIZED HEALTH CARE EDUCATION/TRAINING PROGRAM

## 2019-08-29 PROCEDURE — 84540 ASSAY OF URINE/UREA-N: CPT

## 2019-08-29 PROCEDURE — 2580000003 HC RX 258: Performed by: INTERNAL MEDICINE

## 2019-08-29 PROCEDURE — 94003 VENT MGMT INPAT SUBQ DAY: CPT

## 2019-08-29 PROCEDURE — 6370000000 HC RX 637 (ALT 250 FOR IP): Performed by: INTERNAL MEDICINE

## 2019-08-29 PROCEDURE — 80048 BASIC METABOLIC PNL TOTAL CA: CPT

## 2019-08-29 PROCEDURE — 82962 GLUCOSE BLOOD TEST: CPT

## 2019-08-29 PROCEDURE — B410YZZ FLUOROSCOPY OF ABDOMINAL AORTA USING OTHER CONTRAST: ICD-10-PCS | Performed by: SURGERY

## 2019-08-29 PROCEDURE — 37799 UNLISTED PX VASCULAR SURGERY: CPT

## 2019-08-29 PROCEDURE — 2580000003 HC RX 258: Performed by: STUDENT IN AN ORGANIZED HEALTH CARE EDUCATION/TRAINING PROGRAM

## 2019-08-29 PROCEDURE — C1887 CATHETER, GUIDING: HCPCS

## 2019-08-29 PROCEDURE — 84133 ASSAY OF URINE POTASSIUM: CPT

## 2019-08-29 PROCEDURE — 84300 ASSAY OF URINE SODIUM: CPT

## 2019-08-29 PROCEDURE — C1725 CATH, TRANSLUMIN NON-LASER: HCPCS

## 2019-08-29 PROCEDURE — 80053 COMPREHEN METABOLIC PANEL: CPT

## 2019-08-29 PROCEDURE — P9016 RBC LEUKOCYTES REDUCED: HCPCS

## 2019-08-29 PROCEDURE — 2709999900 HC NON-CHARGEABLE SUPPLY

## 2019-08-29 PROCEDURE — 82330 ASSAY OF CALCIUM: CPT

## 2019-08-29 PROCEDURE — 6360000002 HC RX W HCPCS: Performed by: STUDENT IN AN ORGANIZED HEALTH CARE EDUCATION/TRAINING PROGRAM

## 2019-08-29 PROCEDURE — 36415 COLL VENOUS BLD VENIPUNCTURE: CPT

## 2019-08-29 PROCEDURE — 2500000003 HC RX 250 WO HCPCS: Performed by: STUDENT IN AN ORGANIZED HEALTH CARE EDUCATION/TRAINING PROGRAM

## 2019-08-29 PROCEDURE — 85730 THROMBOPLASTIN TIME PARTIAL: CPT

## 2019-08-29 PROCEDURE — 99291 CRITICAL CARE FIRST HOUR: CPT | Performed by: SURGERY

## 2019-08-29 PROCEDURE — 85018 HEMOGLOBIN: CPT

## 2019-08-29 PROCEDURE — 04753DZ DILATION OF SUPERIOR MESENTERIC ARTERY WITH INTRALUMINAL DEVICE, PERCUTANEOUS APPROACH: ICD-10-PCS | Performed by: SURGERY

## 2019-08-29 PROCEDURE — 99231 SBSQ HOSP IP/OBS SF/LOW 25: CPT | Performed by: INTERNAL MEDICINE

## 2019-08-29 PROCEDURE — 83735 ASSAY OF MAGNESIUM: CPT

## 2019-08-29 PROCEDURE — 2500000003 HC RX 250 WO HCPCS: Performed by: INTERNAL MEDICINE

## 2019-08-29 PROCEDURE — 2000000000 HC ICU R&B

## 2019-08-29 PROCEDURE — C1769 GUIDE WIRE: HCPCS

## 2019-08-29 PROCEDURE — 99232 SBSQ HOSP IP/OBS MODERATE 35: CPT | Performed by: EMERGENCY MEDICINE

## 2019-08-29 PROCEDURE — C1894 INTRO/SHEATH, NON-LASER: HCPCS

## 2019-08-29 PROCEDURE — 37236 OPEN/PERQ PLACE STENT 1ST: CPT | Performed by: SURGERY

## 2019-08-29 PROCEDURE — 75726 ARTERY X-RAYS ABDOMEN: CPT | Performed by: SURGERY

## 2019-08-29 PROCEDURE — 6370000000 HC RX 637 (ALT 250 FOR IP): Performed by: STUDENT IN AN ORGANIZED HEALTH CARE EDUCATION/TRAINING PROGRAM

## 2019-08-29 PROCEDURE — 85014 HEMATOCRIT: CPT

## 2019-08-29 PROCEDURE — 36430 TRANSFUSION BLD/BLD COMPNT: CPT

## 2019-08-29 PROCEDURE — 2580000003 HC RX 258: Performed by: SURGERY

## 2019-08-29 PROCEDURE — 82533 TOTAL CORTISOL: CPT

## 2019-08-29 PROCEDURE — 82805 BLOOD GASES W/O2 SATURATION: CPT

## 2019-08-29 PROCEDURE — 82570 ASSAY OF URINE CREATININE: CPT

## 2019-08-29 PROCEDURE — 85610 PROTHROMBIN TIME: CPT

## 2019-08-29 PROCEDURE — 2500000003 HC RX 250 WO HCPCS: Performed by: SURGERY

## 2019-08-29 PROCEDURE — 84100 ASSAY OF PHOSPHORUS: CPT

## 2019-08-29 PROCEDURE — 6360000002 HC RX W HCPCS: Performed by: INTERNAL MEDICINE

## 2019-08-29 PROCEDURE — 85025 COMPLETE CBC W/AUTO DIFF WBC: CPT

## 2019-08-29 PROCEDURE — 71045 X-RAY EXAM CHEST 1 VIEW: CPT

## 2019-08-29 PROCEDURE — 82436 ASSAY OF URINE CHLORIDE: CPT

## 2019-08-29 PROCEDURE — C1760 CLOSURE DEV, VASC: HCPCS

## 2019-08-29 PROCEDURE — 99223 1ST HOSP IP/OBS HIGH 75: CPT | Performed by: SURGERY

## 2019-08-29 RX ORDER — PANTOPRAZOLE SODIUM 40 MG/10ML
40 INJECTION, POWDER, LYOPHILIZED, FOR SOLUTION INTRAVENOUS DAILY
Status: DISCONTINUED | OUTPATIENT
Start: 2019-08-29 | End: 2019-08-31

## 2019-08-29 RX ORDER — VASOPRESSIN 20 U/ML
INJECTION PARENTERAL
Status: DISPENSED
Start: 2019-08-29 | End: 2019-08-30

## 2019-08-29 RX ORDER — DEXTROSE MONOHYDRATE 50 MG/ML
INJECTION, SOLUTION INTRAVENOUS
Status: DISPENSED
Start: 2019-08-29 | End: 2019-08-30

## 2019-08-29 RX ORDER — 0.9 % SODIUM CHLORIDE 0.9 %
250 INTRAVENOUS SOLUTION INTRAVENOUS ONCE
Status: DISCONTINUED | OUTPATIENT
Start: 2019-08-29 | End: 2019-08-30

## 2019-08-29 RX ORDER — SODIUM CHLORIDE 9 MG/ML
INJECTION, SOLUTION INTRAVENOUS ONCE
Status: COMPLETED | OUTPATIENT
Start: 2019-08-29 | End: 2019-08-29

## 2019-08-29 RX ORDER — SODIUM CHLORIDE, SODIUM LACTATE, POTASSIUM CHLORIDE, AND CALCIUM CHLORIDE .6; .31; .03; .02 G/100ML; G/100ML; G/100ML; G/100ML
500 INJECTION, SOLUTION INTRAVENOUS ONCE
Status: COMPLETED | OUTPATIENT
Start: 2019-08-29 | End: 2019-08-29

## 2019-08-29 RX ORDER — 0.9 % SODIUM CHLORIDE 0.9 %
250 INTRAVENOUS SOLUTION INTRAVENOUS ONCE
Status: COMPLETED | OUTPATIENT
Start: 2019-08-29 | End: 2019-08-29

## 2019-08-29 RX ORDER — 0.9 % SODIUM CHLORIDE 0.9 %
1000 INTRAVENOUS SOLUTION INTRAVENOUS ONCE
Status: COMPLETED | OUTPATIENT
Start: 2019-08-29 | End: 2019-08-29

## 2019-08-29 RX ORDER — 0.9 % SODIUM CHLORIDE 0.9 %
10 VIAL (ML) INJECTION DAILY
Status: DISCONTINUED | OUTPATIENT
Start: 2019-08-29 | End: 2019-08-31

## 2019-08-29 RX ORDER — SODIUM CHLORIDE, SODIUM LACTATE, POTASSIUM CHLORIDE, AND CALCIUM CHLORIDE .6; .31; .03; .02 G/100ML; G/100ML; G/100ML; G/100ML
1000 INJECTION, SOLUTION INTRAVENOUS ONCE
Status: DISCONTINUED | OUTPATIENT
Start: 2019-08-29 | End: 2019-09-03

## 2019-08-29 RX ADMIN — CHLORHEXIDINE GLUCONATE 0.12% ORAL RINSE 15 ML: 1.2 LIQUID ORAL at 08:58

## 2019-08-29 RX ADMIN — SODIUM CHLORIDE 1000 ML: 9 INJECTION, SOLUTION INTRAVENOUS at 18:00

## 2019-08-29 RX ADMIN — CALCIUM GLUCONATE 1 G: 98 INJECTION, SOLUTION INTRAVENOUS at 09:57

## 2019-08-29 RX ADMIN — Medication 10 ML: at 20:55

## 2019-08-29 RX ADMIN — PIPERACILLIN SODIUM AND TAZOBACTAM SODIUM 3.38 G: 3; .375 INJECTION, POWDER, LYOPHILIZED, FOR SOLUTION INTRAVENOUS at 06:00

## 2019-08-29 RX ADMIN — Medication 30 MCG/MIN: at 22:28

## 2019-08-29 RX ADMIN — SODIUM CHLORIDE, POTASSIUM CHLORIDE, SODIUM LACTATE AND CALCIUM CHLORIDE 500 ML: 600; 310; 30; 20 INJECTION, SOLUTION INTRAVENOUS at 19:38

## 2019-08-29 RX ADMIN — VASOPRESSIN 0.02 UNITS/MIN: 20 INJECTION INTRAVENOUS at 20:03

## 2019-08-29 RX ADMIN — SODIUM BICARBONATE: 84 INJECTION, SOLUTION INTRAVENOUS at 09:35

## 2019-08-29 RX ADMIN — HYDROCORTISONE SODIUM SUCCINATE 100 MG: 100 INJECTION, POWDER, FOR SOLUTION INTRAMUSCULAR; INTRAVENOUS at 20:55

## 2019-08-29 RX ADMIN — INSULIN HUMAN 5 UNITS: 100 INJECTION, SOLUTION PARENTERAL at 09:50

## 2019-08-29 RX ADMIN — LATANOPROST 1 DROP: 50 SOLUTION OPHTHALMIC at 21:31

## 2019-08-29 RX ADMIN — PANTOPRAZOLE SODIUM 40 MG: 40 INJECTION, POWDER, FOR SOLUTION INTRAVENOUS at 18:00

## 2019-08-29 RX ADMIN — PROPOFOL 10 MCG/KG/MIN: 10 INJECTION, EMULSION INTRAVENOUS at 12:55

## 2019-08-29 RX ADMIN — Medication 50 MEQ: at 08:15

## 2019-08-29 RX ADMIN — Medication 10 ML: at 08:58

## 2019-08-29 RX ADMIN — Medication 10 ML: at 18:00

## 2019-08-29 RX ADMIN — SODIUM CHLORIDE 250 ML: 9 INJECTION, SOLUTION INTRAVENOUS at 02:40

## 2019-08-29 RX ADMIN — SODIUM BICARBONATE: 84 INJECTION, SOLUTION INTRAVENOUS at 18:12

## 2019-08-29 RX ADMIN — PIPERACILLIN SODIUM AND TAZOBACTAM SODIUM 3.38 G: 3; .375 INJECTION, POWDER, LYOPHILIZED, FOR SOLUTION INTRAVENOUS at 14:00

## 2019-08-29 RX ADMIN — PIPERACILLIN SODIUM AND TAZOBACTAM SODIUM 3.38 G: 3; .375 INJECTION, POWDER, LYOPHILIZED, FOR SOLUTION INTRAVENOUS at 21:30

## 2019-08-29 RX ADMIN — Medication 10 ML: at 20:46

## 2019-08-29 RX ADMIN — SODIUM CHLORIDE: 9 INJECTION, SOLUTION INTRAVENOUS at 08:30

## 2019-08-29 RX ADMIN — FLUCONAZOLE 400 MG: 400 INJECTION, SOLUTION INTRAVENOUS at 09:57

## 2019-08-29 RX ADMIN — CALCIUM GLUCONATE 3 G: 98 INJECTION, SOLUTION INTRAVENOUS at 20:25

## 2019-08-29 RX ADMIN — SODIUM CHLORIDE 250 ML: 9 INJECTION, SOLUTION INTRAVENOUS at 14:23

## 2019-08-29 RX ADMIN — CHLORHEXIDINE GLUCONATE 0.12% ORAL RINSE 15 ML: 1.2 LIQUID ORAL at 20:56

## 2019-08-29 RX ADMIN — Medication 2 MCG/MIN: at 08:30

## 2019-08-29 RX ADMIN — INSULIN LISPRO 4 UNITS: 100 INJECTION, SOLUTION INTRAVENOUS; SUBCUTANEOUS at 17:45

## 2019-08-29 ASSESSMENT — PAIN SCALES - GENERAL
PAINLEVEL_OUTOF10: 2
PAINLEVEL_OUTOF10: 0
PAINLEVEL_OUTOF10: 2
PAINLEVEL_OUTOF10: 3
PAINLEVEL_OUTOF10: 2
PAINLEVEL_OUTOF10: 0
PAINLEVEL_OUTOF10: 0

## 2019-08-29 ASSESSMENT — PULMONARY FUNCTION TESTS
PIF_VALUE: 19
PIF_VALUE: 25
PIF_VALUE: 24
PIF_VALUE: 20
PIF_VALUE: 22
PIF_VALUE: 20
PIF_VALUE: 21
PIF_VALUE: 20
PIF_VALUE: 20
PIF_VALUE: 21
PIF_VALUE: 21
PIF_VALUE: 22
PIF_VALUE: 20
PIF_VALUE: 23
PIF_VALUE: 0
PIF_VALUE: 21
PIF_VALUE: 23
PIF_VALUE: 20
PIF_VALUE: 21
PIF_VALUE: 19
PIF_VALUE: 21
PIF_VALUE: 25
PIF_VALUE: 23
PIF_VALUE: 21
PIF_VALUE: 22
PIF_VALUE: 20
PIF_VALUE: 20
PIF_VALUE: 24
PIF_VALUE: 24
PIF_VALUE: 21
PIF_VALUE: 19
PIF_VALUE: 26
PIF_VALUE: 24
PIF_VALUE: 21
PIF_VALUE: 24

## 2019-08-29 NOTE — CONSULTS
Nephrology Consult Note  Patient's Name: Jania Martinez  8:56 AM  8/29/2019         Reason for Consult: Acute kidney injury  Requesting Physician:  Eunice Trujillo MD    Chief Complaint: Abdominal pain  History Obtained From: Electronic record    History of Present Ilness:    Jania Martinez is a 81 y.o. female with a history of diabetes mellitus, hypertension, COPD, and osteoarthritis. She presented to ED 2 days ago with complaints of abdominal pain. This was associated nausea and emesis. .  She does have a history of chronic abdominal pain however this episode was more severe. Upon arrival initial vitals showed blood pressure 146/84, temperature 97.6 and a pulse of 93. Laboratory data was significant for a leukocytosis of 12.9, hemoglobin 10.2, hematocrit 32.9, sodium 141, potassium 3.6, CO2 101, BUN 15, and a creatinine of 1.0. A noncontrast CT scan of the abdomen and pelvis revealed inflammatory changes for watch for any thickening of the cecum and ascending colon as well as the distal bowel loop. Findings were reported as pneumatosis intestinalis with enterocolitis. Patient received ceftriaxone, metronidazole, and a 2 L NS fluid bolus. She was evaluated by general surgery. She was subsequently taken to the OR for a diagnostic laparoscopy which was subsequently converted to an open laparotomy with lysis of adhesions and partial small bowel resection. Postop creatinine shows worsening. Patient became more hemodynamically unstable;  her urine output was trending down. Creatinine had increased to 1.4 mEq/L associated with worsening metabolic acidosis.   Hence renal consult    Past Medical History:   Diagnosis Date    Anxiety     Arthritis     [facility]     Diabetes mellitus ([facility]     Hypertension     Osteoarthritis        Past Surgical History:   Procedure Laterality Date    APPENDECTOMY      CHOLECYSTECTOMY      EYE SURGERY      HIP FRACTURE obtained due to patient factors. Physical exam:  Vitals:    19 0700   BP: 105/62   Pulse: 83   Resp: 16   Temp:    SpO2:            General: intubated, sedated  Eyes: PERRL. No sclera icterus. No conjunctival injection; ?exopthalmos; opacity R eye  ENT: No discharge. Pharynx clear. Neck: Trachea midline. Normal thyroid. Lungs: coarse breath sounds  CV: Regular rate. Regular rhythm. No murmur or rub. .   Abd: Non-tender. Non-distended. No masses. No organmegaly. Decreased bowel sounds; wound vac applied  Skin: Warm and dry. No nodule on exposed extremities. No rash on exposed extremities. Ext: No cyanosis, clubbing, edema ; stables to L hip  Neuro: Awake. Follows commands. Positive pupils/gag/corneals. Normal pain response. Data:   Labs:  Lab Results   Component Value Date     2019     2019     2019    K 5.5 (H) 2019    K 3.4 (L) 2019    K 3.8 2019     2019    CO2 13 (L) 2019    CO2 23 2019    CO2 30 (H) 2019    CREATININE 1.4 (H) 2019    CREATININE 1.0 2019    CREATININE 1.3 (H) 2019    BUN 24 (H) 2019    BUN 18 2019    BUN 20 2019    GLUCOSE 346 (H) 2019    GLUCOSE 191 (H) 2019    GLUCOSE 119 (H) 2019    PHOS 7.3 (H) 2019    PHOS 2.2 (L) 2019    WBC 15.2 (H) 2019    WBC 16.7 (H) 2019    WBC 27.0 (H) 2019    HGB 10.4 (L) 2019    HGB 5.7 (L) 2019    HGB 7.2 (L) 2019    HCT 32.6 (L) 2019    HCT 18.9 (L) 2019    HCT 23.7 (L) 2019    MCV 90.3 2019     (L) 2019         Imaging:  Ct Abdomen Pelvis Wo Contrast    Result Date: 2019  Patient MRN:  [id number] : 1935 Age: 81 years Gender: Female Order Date:  2019 9:00 AM EXAM: CT ABDOMEN PELVIS WO CONTRAST number of images 296 Technique: Low-dose CT  acquisition technique included one of following options; 1 .  Automated hypercapnic resp failure        Plan    1. Aggressive fluid resuscitation  2. Bicarb drip  3. Medically treat high K  4. Check urine indices  5. Adjust all labs for level of kidney function  7. Supportive care  8.   May require dialytic support    D/W Dr Murphy Munoz    Will follow   Thanks          Saroj Harris MD  8:56 AM  8/29/2019

## 2019-08-29 NOTE — PROGRESS NOTES
Stage  CI HR MAP TPRI SVI   Baseline 1.9 90   21   Challenge 2.3 93   25   Result (%Ä) 22.4 3.5   19.4

## 2019-08-29 NOTE — PROGRESS NOTES
HEENT: Round and reactive pupils. Moist mucous membranes. No ulcerations or thrush. Neck: Supple to movements. Chest: No use of accessory muscles to breathe. Symmetrical expansion. No wheezing, crackles or rhonchi. Cardiovascular: S1 and S2 are rhythmic and regular. No murmurs appreciated. Abdomen: -bowel sounds to auscultation. Them is open postop. No masses felt. No hepatosplenomegaly. Extremities: No clubbing, no cyanosis, no edema.   Lines: peripheral              Art line 8-28-19              CBC triple-lumen catheter 8/28/2019  Laboratory and Tests Review:  Lab Results   Component Value Date    WBC 15.2 (H) 08/29/2019    WBC 16.7 (H) 08/28/2019    WBC 27.0 (H) 08/28/2019    HGB 7.6 (L) 08/29/2019    HCT 23.1 (L) 08/29/2019    MCV 90.3 08/29/2019     (L) 08/29/2019     Lab Results   Component Value Date    NEUTROABS 14.74 (H) 08/29/2019    NEUTROABS 16.20 (H) 08/28/2019    NEUTROABS 24.53 (H) 08/28/2019     No results found for: CRPHS  Lab Results   Component Value Date    ALT 11 08/29/2019    AST 26 08/29/2019    ALKPHOS 71 08/29/2019    BILITOT 0.7 08/29/2019     Lab Results   Component Value Date     08/29/2019    K 4.9 08/29/2019    K 3.8 08/28/2019     08/29/2019    CO2 18 08/29/2019    BUN 24 08/29/2019    CREATININE 1.4 08/29/2019    CREATININE 1.4 08/29/2019    CREATININE 1.0 08/28/2019    GFRAA 43 08/29/2019    LABGLOM 43 08/29/2019    GLUCOSE 304 08/29/2019    GLUCOSE 121 11/29/2011    PROT 4.8 08/29/2019    LABALBU 2.5 08/29/2019    LABALBU 3.3 11/29/2011    CALCIUM 6.8 08/29/2019    BILITOT 0.7 08/29/2019    ALKPHOS 71 08/29/2019    AST 26 08/29/2019    ALT 11 08/29/2019     Lab Results   Component Value Date    CRP 21.3 (H) 08/28/2019     Lab Results   Component Value Date    SEDRATE 73 (H) 08/28/2019    SEDRATE 56 (H) 09/25/2017     Radiology:      Microbiology:   Lab Results   Component Value Date    BC 24 Hours- no growth 08/27/2019    BC 5 Days- no growth 07/08/2019     Lab Results   Component Value Date    BLOODCULT2 24 Hours- no growth 08/27/2019    BLOODCULT2 5 Days- no growth 07/08/2019     No results found for: WNDABS  No results found for: RESPSMEAR  No results found for: MPNEUMO, CLAMYDCU, LABLEGI, AFBCX, FUNGSM, LABFUNG  No results found for: CULTRESP  No results found for: CXCATHTIP  No results found for: BFCS  No results found for: CXSURG  Urine Culture, Routine   Date Value Ref Range Status   07/09/2019 Growth not present  Final     No results found for: [address]     ASSESSMENT:  · Septic shock from necrotic/ischemic panel    PLAN:  · Continue Zosyn and Diflucan  · Discussed with surgery  · Check final cultures  · Monitor labs    Edwardo Ariza  4:31 PM  8/29/2019

## 2019-08-29 NOTE — PLAN OF CARE
Problem: Restraint Use - Nonviolent/Non-Self-Destructive Behavior:  Goal: Absence of restraint-related injury  Description  Absence of restraint-related injury  8/29/2019 1834 by Lluvia Stone RN  Outcome: Met This Shift     Problem: Restraint Use - Nonviolent/Non-Self-Destructive Behavior:  Goal: Absence of restraint indications  Description  Absence of restraint indications  8/29/2019 1834 by Lluvia Stone RN  Outcome: Not Met This Shift

## 2019-08-29 NOTE — PROGRESS NOTES
Data in Support of Terminal Illness:  N/A Palliative Patient. Electronically signed by Sandy Miller DO on 8/29/2019 at 10:28 AM      Thank you for allowing Palliative Medicine to participate in the care of Clem Padilla. Note: This report was completed using [employer] voiced recognition software. Every effort has been made to ensure accuracy; however, inadvertent computerized transcription errors may be present.

## 2019-08-29 NOTE — PROGRESS NOTES
Coordination of care discussion and chart review with PM team.Pt seen in icu, she is intubated after a small bowel surgery. Discussed with unit .  will remain available for on-going psychosocial support, as needed.

## 2019-08-29 NOTE — PROCEDURES
Doe Zurita is a 81 y.o. female patient. 1. Pneumatosis intestinalis    2. Enterocolitis    3. Leukocytosis, unspecified type      Past Medical History:   Diagnosis Date    Anxiety     Arthritis     COPD (chronic obstructive pulmonary disease) (HCC)     Diabetes mellitus (HCC)     Hypertension     Osteoarthritis      Blood pressure (!) 131/49, pulse 93, temperature 97.3 °F (36.3 °C), resp. rate 19, height 5' 3\" (1.6 m), weight 101 lb 5 oz (46 kg), SpO2 100 %, not currently breastfeeding. Central Line  Date/Time: 8/28/2019 8:49 PM  Performed by: Bonnie Ernst MD  Authorized by: Bonnie Ernst MD   Consent: The procedure was performed in an emergent situation. Required items: required blood products, implants, devices, and special equipment available  Patient identity confirmed: arm band  Time out: Immediately prior to procedure a \"time out\" was called to verify the correct patient, procedure, equipment, support staff and site/side marked as required.   Indications: vascular access  Anesthesia: local infiltration    Anesthesia:  Local Anesthetic: lidocaine 1% with epinephrine  Anesthetic total: 10 mL    Sedation:  Patient sedated: yes  Analgesia: see MAR for details    Preparation: skin prepped with ChloraPrep  Skin prep agent dried: skin prep agent completely dried prior to procedure  Sterile barriers: all five maximum sterile barriers used - cap, mask, sterile gown, sterile gloves, and large sterile sheet  Hand hygiene: hand hygiene performed prior to central venous catheter insertion  Location details: right internal jugular  Patient position: Trendelenburg  Catheter type: triple lumen  Catheter size: 7.5 Fr  Pre-procedure: landmarks identified  Ultrasound guidance: yes  Number of attempts: 1  Successful placement: yes  Post-procedure: line sutured and dressing applied  Assessment: blood return through all ports  Patient tolerance: Patient tolerated the procedure well with no immediate

## 2019-08-29 NOTE — PLAN OF CARE
Problem: Falls - Risk of:  Goal: Will remain free from falls  Description  Will remain free from falls  8/29/2019 0220 by Marco Montes RN  Outcome: Met This Shift  8/28/2019 1424 by Londell Dancer, RN  Outcome: Met This Shift  Goal: Absence of physical injury  Description  Absence of physical injury  8/29/2019 0220 by Marco Montes RN  Outcome: Met This Shift  8/28/2019 1424 by Londell Dancer, RN  Outcome: Met This Shift     Problem: Pain:  Goal: Pain level will decrease  Description  Pain level will decrease  Outcome: Met This Shift  Goal: Control of acute pain  Description  Control of acute pain  Outcome: Met This Shift  Goal: Control of chronic pain  Description  Control of chronic pain  Outcome: Met This Shift     Problem: Risk for Impaired Skin Integrity  Goal: Tissue integrity - skin and mucous membranes  Description  Structural intactness and normal physiological function of skin and  mucous membranes.   Outcome: Met This Shift     Problem: Restraint Use - Nonviolent/Non-Self-Destructive Behavior:  Goal: Absence of restraint-related injury  Description  Absence of restraint-related injury  8/29/2019 0220 by Marco Montes RN  Outcome: Met This Shift  8/28/2019 2037 by Bryce Calderon RN  Outcome: Met This Shift     Problem: Restraint Use - Nonviolent/Non-Self-Destructive Behavior:  Goal: Absence of restraint indications  Description  Absence of restraint indications  8/29/2019 0220 by Marco Montes RN  Outcome: Not Met This Shift  8/28/2019 2037 by Bryce Calderon RN  Outcome: Not Met This Shift

## 2019-08-29 NOTE — PROGRESS NOTES
Incisions absent               Varicose veins absent               Wounds absent               normalcaprefill                   L LE     Edema absent                Incisions present - left lateral hip incisions               Varicose veins absent               Wounds absent              normalcaprefill                   R brachial +2 L brachial +2   R radial +2 L radial +2   R femoral +2 L femoral +2   R popliteal +1 L popliteal +1   R posterior tibial   L posterior tibial     R dorsalis pedis   L dorsalis pedis           LABS:          Lab Results   Component Value Date     WBC 27.0 (H) 2019     HGB 9.2 (L) 2019     HCT 29.5 (L) 2019      2019     PROTIME 17.2 (H) 2019     INR 1.5 2019     K 3.8 2019     BUN 20 2019     CREATININE 1.3 (H) 2019         RADIOLOGY:  Ct Abdomen Pelvis Wo Contrast     Result Date: 2019  Patient MRN:  [id number] : 1935 Age: 81 years Gender: Female Order Date:  2019 9:00 AM EXAM: CT ABDOMEN PELVIS WO CONTRAST number of images 296 Technique: Low-dose CT  acquisition technique included one of following options; 1 . Automated exposure control, 2. Adjustment of MA and or KV according to patient's size or 3. Use of iterative reconstruction. INDICATION:  abd pain  COMPARISON: 2019 FINDINGS: The examination is limited due to the lack of contrast. The lung bases demonstrate scarring and mild  COPD. Small hiatal hernia is present. Liver is decreased in attenuation likely fatty infiltrated. Branching pattern of air is identified in the peripheral liver concerning for portal venous air. There is previous cholecystectomy. Spleen, pancreas, the adrenals and the kidneys are normal. There is significant calcification of aorta and iliac arteries. Severe compression deformity of L2 with the retropulsion of bony fragments with  compromise of spinal canal is noted without significant change. Pelvis.  Bladder is distended. There is inflammation and thickening of the cecum and ascending colon with a fluid-filled dilated bowel loops in the right lower quadrant. There is air collection and air bubbles in the periphery of the bowel loops in the right lower quadrant concerning for pneumatosis of bowel loops/colon.      Inflammatory changes with wall thickening and edema in the cecum and ascending colon as well as a dilated bowel loop in the right lower quadrant with the suggestion of pneumatosis. There is also suggestion of portal venous air concerning for severe inflammation. Consideration should be given to severe enterocolitis in the right lower quadrant. Examination is otherwise limited due to lack of contrast. ALERT:  THIS IS AN ABNORMAL REPORT      Xr Chest Portable     Result Date: 2019  Patient MRN: [id number] : 1935 Age:  81 years Gender: Female Order Date: 2019 10:30 AM Exam: XR CHEST PORTABLE Number of Views: 1 Indication:   Preop Comparison: 2019 Findings: There is a normal cardiomediastinal silhouette with thoracic aortic vascular calcifications with severe degenerative changes left glenohumeral joint. . No pneumothorax.      No radiographic evidence of acute cardiopulmonary disease. Severe left glenohumeral degenerative change.           Assesment/Plan  81 y.o. female with acute-on-chronic mesenteric ischemia             I reviewed Dr. Anthony Cobian consultation and discussed the patient with Dr. Neisha Suárez. 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 The patient is status post laparotomy and bowel resection for ischemia. She remains hypotensive. I feel the only possible chance for patient survival is revascularization. I do not feel that she is a candidate for surgical bypass due to severe aortoiliac disease.   There is a chance to stent the celiac artery origin to see

## 2019-08-29 NOTE — PROGRESS NOTES
[facility]  Critical Care Attending Note    ICU Adm Date: 8/28/19    Diagnosis: Pneumatosis intestinalis    Significant pre-existing medical conditions:   COPD HTN DM    Surgical Procedures:   8/28-lysis of adhesions, bowel resection and open wound. Recent Events: hypotensive, acidotic, anuric and hyperkalemic this morning. Physical Exam:   /66   Pulse 87   Temp 97.5 °F (36.4 °C)   Resp 20   Ht 5' 3\" (1.6 m)   Wt 101 lb 5 oz (46 kg)   SpO2 96%   BMI 17.95 kg/m²    Arouses and follows simple commands, purposefult  Intubated and sedated as needed, bilateral breath sounds  Regular rhythm, normal sounds  Open abdomen with wound vac. Problems and Plan:   Post bowel resection with open abdomen, monitor and support. Pain control. Take back for closure per GS  Respiratory failure following surgery, manage mechanical ventilator. Septic, hypovolemic shock. Treated with fluid resuscition and levophed. Consulted Nephrology, started on bicarb drip. Acute blood loss anemia, required 2 units PRBSs    Attestation: Reviewed Surgical Residents documentation and agree with the evaluation and plan. Critical Care: 35 minutes evaluating and managing patient post op bowel resection and respiratory failure at risk for further deterioration and death.

## 2019-08-29 NOTE — ANESTHESIA POSTPROCEDURE EVALUATION
Department of Anesthesiology  Postprocedure Note    Patient: Karli Infante  MRN: [id number]  YOB: 1935  Date of evaluation: 8/28/2019  Time:  9:00 PM     Procedure Summary     Date:  08/28/19 [place]  [facility] [place] [facility]    Anesthesia Start:  1410 Anesthesia Stop:  1851    Procedure:  DIAGNOSTIC LAPAROSCOPY CONVERTED TO OPEN LAPAROTOMY, LYSIS OF ADHESIONS FOR TWO HOURS, SMALL BOWEL RESECTION WITH RIGHT COLON (N/A Abdomen) Diagnosis:  (/)    Surgeon:  Radha Dallas MD Responsible Provider:  Francisco Delgado DO    Anesthesia Type:  general ASA Status:  4          Anesthesia Type: general    Mary Phase I: Mary Score: 7    Mary Phase II:      Last vitals: Reviewed and per EMR flowsheets.        Anesthesia Post Evaluation    Patient location during evaluation: PACU  Patient participation: complete - patient participated  Level of consciousness: awake and alert  Pain score: 1  Airway patency: patent  Nausea & Vomiting: no nausea and no vomiting  Complications: no  Cardiovascular status: hemodynamically stable  Respiratory status: acceptable  Hydration status: euvolemic

## 2019-08-29 NOTE — PROGRESS NOTES
Patient temp 86.6 F, verified by another RN. Patient covered with multiple warm blankets, warming blanket, and heating lamps.  Will continue to monitor

## 2019-08-30 ENCOUNTER — ANESTHESIA EVENT (OUTPATIENT)
Dept: OPERATING ROOM | Age: 84
DRG: 853 | End: 2019-08-30
Payer: COMMERCIAL

## 2019-08-30 ENCOUNTER — ANESTHESIA (OUTPATIENT)
Dept: OPERATING ROOM | Age: 84
DRG: 853 | End: 2019-08-30
Payer: COMMERCIAL

## 2019-08-30 ENCOUNTER — APPOINTMENT (OUTPATIENT)
Dept: GENERAL RADIOLOGY | Age: 84
DRG: 853 | End: 2019-08-30
Payer: COMMERCIAL

## 2019-08-30 VITALS
SYSTOLIC BLOOD PRESSURE: 100 MMHG | DIASTOLIC BLOOD PRESSURE: 52 MMHG | OXYGEN SATURATION: 100 % | RESPIRATION RATE: 21 BRPM

## 2019-08-30 LAB
AADO2: 101.5 MMHG
AADO2: 101.8 MMHG
AADO2: 97.2 MMHG
AADO2: 99 MMHG
ALBUMIN SERPL-MCNC: 1.4 G/DL (ref 3.5–5.2)
ALBUMIN SERPL-MCNC: 1.5 G/DL (ref 3.5–5.2)
ALP BLD-CCNC: 52 U/L (ref 35–104)
ALP BLD-CCNC: 53 U/L (ref 35–104)
ALT SERPL-CCNC: 260 U/L (ref 0–32)
ALT SERPL-CCNC: 264 U/L (ref 0–32)
ANGLE (CLOT STRENGTH): 42.4 DEGREE (ref 59–74)
ANION GAP SERPL CALCULATED.3IONS-SCNC: 11 MMOL/L (ref 7–16)
ANION GAP SERPL CALCULATED.3IONS-SCNC: 13 MMOL/L (ref 7–16)
ANION GAP SERPL CALCULATED.3IONS-SCNC: 15 MMOL/L (ref 7–16)
ANION GAP SERPL CALCULATED.3IONS-SCNC: 16 MMOL/L (ref 7–16)
ANISOCYTOSIS: ABNORMAL
APTT: 36.3 SEC (ref 24.5–35.1)
APTT: 39.2 SEC (ref 24.5–35.1)
APTT: 53.7 SEC (ref 24.5–35.1)
AST SERPL-CCNC: 721 U/L (ref 0–31)
AST SERPL-CCNC: 741 U/L (ref 0–31)
B.E.: -0.2 MMOL/L (ref -3–3)
B.E.: -0.4 MMOL/L (ref -3–3)
B.E.: -0.6 MMOL/L (ref -3–3)
B.E.: 0.1 MMOL/L (ref -3–3)
BASOPHILS ABSOLUTE: 0 E9/L (ref 0–0.2)
BASOPHILS RELATIVE PERCENT: 0.1 % (ref 0–2)
BILIRUB SERPL-MCNC: 0.5 MG/DL (ref 0–1.2)
BILIRUB SERPL-MCNC: 0.5 MG/DL (ref 0–1.2)
BILIRUBIN DIRECT: 0.2 MG/DL (ref 0–0.3)
BILIRUBIN, INDIRECT: 0.3 MG/DL (ref 0–1)
BLOOD BANK DISPENSE STATUS: NORMAL
BLOOD BANK PRODUCT CODE: NORMAL
BPU ID: NORMAL
BUN BLDV-MCNC: 26 MG/DL (ref 8–23)
BUN BLDV-MCNC: 29 MG/DL (ref 8–23)
BUN BLDV-MCNC: 30 MG/DL (ref 8–23)
BUN BLDV-MCNC: 33 MG/DL (ref 8–23)
BURR CELLS: ABNORMAL
CALCIUM IONIZED: 1 MMOL/L (ref 1.15–1.33)
CALCIUM IONIZED: 1.02 MMOL/L (ref 1.15–1.33)
CALCIUM IONIZED: 1.03 MMOL/L (ref 1.15–1.33)
CALCIUM IONIZED: 1.04 MMOL/L (ref 1.15–1.33)
CALCIUM SERPL-MCNC: 6.7 MG/DL (ref 8.6–10.2)
CALCIUM SERPL-MCNC: 6.7 MG/DL (ref 8.6–10.2)
CALCIUM SERPL-MCNC: 7 MG/DL (ref 8.6–10.2)
CALCIUM SERPL-MCNC: 7.2 MG/DL (ref 8.6–10.2)
CHLORIDE BLD-SCNC: 101 MMOL/L (ref 98–107)
CHLORIDE BLD-SCNC: 101 MMOL/L (ref 98–107)
CHLORIDE BLD-SCNC: 104 MMOL/L (ref 98–107)
CHLORIDE BLD-SCNC: 107 MMOL/L (ref 98–107)
CO2: 20 MMOL/L (ref 22–29)
CO2: 20 MMOL/L (ref 22–29)
CO2: 23 MMOL/L (ref 22–29)
CO2: 24 MMOL/L (ref 22–29)
COHB: 0.3 % (ref 0–1.5)
COHB: 0.3 % (ref 0–1.5)
COHB: 0.7 % (ref 0–1.5)
COHB: 0.9 % (ref 0–1.5)
CREAT SERPL-MCNC: 1.7 MG/DL (ref 0.5–1)
CREAT SERPL-MCNC: 2 MG/DL (ref 0.5–1)
CREAT SERPL-MCNC: 2.1 MG/DL (ref 0.5–1)
CREAT SERPL-MCNC: 2.3 MG/DL (ref 0.5–1)
CREATININE URINE: 67 MG/DL (ref 29–226)
CRITICAL: ABNORMAL
DATE ANALYZED: ABNORMAL
DATE OF COLLECTION: ABNORMAL
DESCRIPTION BLOOD BANK: NORMAL
EOSINOPHILS ABSOLUTE: 0 E9/L (ref 0.05–0.5)
EOSINOPHILS RELATIVE PERCENT: 0 % (ref 0–6)
EPL-TEG: 0 % (ref 0–15)
FIO2: 40 %
FIO2: 60 %
G-TEG: 7 K D/SC (ref 4.5–11)
GFR AFRICAN AMERICAN: 24
GFR AFRICAN AMERICAN: 27
GFR AFRICAN AMERICAN: 29
GFR AFRICAN AMERICAN: 35
GFR NON-AFRICAN AMERICAN: 24 ML/MIN/1.73
GFR NON-AFRICAN AMERICAN: 27 ML/MIN/1.73
GFR NON-AFRICAN AMERICAN: 29 ML/MIN/1.73
GFR NON-AFRICAN AMERICAN: 35 ML/MIN/1.73
GLUCOSE BLD-MCNC: 137 MG/DL (ref 74–99)
GLUCOSE BLD-MCNC: 141 MG/DL (ref 74–99)
GLUCOSE BLD-MCNC: 148 MG/DL (ref 74–99)
GLUCOSE BLD-MCNC: 149 MG/DL (ref 74–99)
HCO3: 20 MMOL/L (ref 22–26)
HCO3: 21.8 MMOL/L (ref 22–26)
HCO3: 22.7 MMOL/L (ref 22–26)
HCO3: 23.4 MMOL/L (ref 22–26)
HCT VFR BLD CALC: 14.3 % (ref 34–48)
HCT VFR BLD CALC: 18.9 % (ref 34–48)
HCT VFR BLD CALC: 20.1 % (ref 34–48)
HCT VFR BLD CALC: 25.1 % (ref 34–48)
HEMOGLOBIN: 4.9 G/DL (ref 11.5–15.5)
HEMOGLOBIN: 6.6 G/DL (ref 11.5–15.5)
HEMOGLOBIN: 6.9 G/DL (ref 11.5–15.5)
HEMOGLOBIN: 8.9 G/DL (ref 11.5–15.5)
HHB: 1.1 % (ref 0–5)
HHB: 1.6 % (ref 0–5)
HHB: 2.1 % (ref 0–5)
HHB: 2.4 % (ref 0–5)
HYPOCHROMIA: ABNORMAL
INR BLD: 1.8
K (CLOTTING TIME): 4.2 MIN (ref 1–3)
LAB: ABNORMAL
LACTIC ACID, SEPSIS: 1.9 MMOL/L (ref 0.5–1.9)
LACTIC ACID, SEPSIS: 1.9 MMOL/L (ref 0.5–1.9)
LACTIC ACID, SEPSIS: 2.8 MMOL/L (ref 0.5–1.9)
LACTIC ACID, SEPSIS: 3.5 MMOL/L (ref 0.5–1.9)
LY30 (FIBRINOLYSIS): 0 % (ref 0–8)
LYMPHOCYTES ABSOLUTE: 0.83 E9/L (ref 1.5–4)
LYMPHOCYTES RELATIVE PERCENT: 6.1 % (ref 20–42)
Lab: ABNORMAL
MA (MAX AMPLITUDE): 58.4 MM (ref 50–70)
MAGNESIUM: 1.5 MG/DL (ref 1.6–2.6)
MAGNESIUM: 1.8 MG/DL (ref 1.6–2.6)
MAGNESIUM: 1.9 MG/DL (ref 1.6–2.6)
MAGNESIUM: 2 MG/DL (ref 1.6–2.6)
MCH RBC QN AUTO: 29.3 PG (ref 26–35)
MCH RBC QN AUTO: 29.5 PG (ref 26–35)
MCH RBC QN AUTO: 29.5 PG (ref 26–35)
MCH RBC QN AUTO: 29.7 PG (ref 26–35)
MCHC RBC AUTO-ENTMCNC: 34.3 % (ref 32–34.5)
MCHC RBC AUTO-ENTMCNC: 34.3 % (ref 32–34.5)
MCHC RBC AUTO-ENTMCNC: 34.9 % (ref 32–34.5)
MCHC RBC AUTO-ENTMCNC: 35.5 % (ref 32–34.5)
MCV RBC AUTO: 82.6 FL (ref 80–99.9)
MCV RBC AUTO: 84.4 FL (ref 80–99.9)
MCV RBC AUTO: 85.9 FL (ref 80–99.9)
MCV RBC AUTO: 86.7 FL (ref 80–99.9)
METER GLUCOSE: 100 MG/DL (ref 74–99)
METER GLUCOSE: 106 MG/DL (ref 74–99)
METER GLUCOSE: 130 MG/DL (ref 74–99)
METER GLUCOSE: 137 MG/DL (ref 74–99)
METER GLUCOSE: 139 MG/DL (ref 74–99)
METER GLUCOSE: 142 MG/DL (ref 74–99)
METHB: 0.3 % (ref 0–1.5)
METHB: 0.4 % (ref 0–1.5)
METHB: 0.7 % (ref 0–1.5)
METHB: 0.7 % (ref 0–1.5)
MODE: AC
MONOCYTES ABSOLUTE: 0.7 E9/L (ref 0.1–0.95)
MONOCYTES RELATIVE PERCENT: 5.2 % (ref 2–12)
MRSA CULTURE ONLY: NORMAL
NEUTROPHILS ABSOLUTE: 12.37 E9/L (ref 1.8–7.3)
NEUTROPHILS RELATIVE PERCENT: 88.7 % (ref 43–80)
NUCLEATED RED BLOOD CELLS: 1.7 /100 WBC
O2 CONTENT: 10.3 ML/DL
O2 CONTENT: 10.5 ML/DL
O2 CONTENT: 13.5 ML/DL
O2 CONTENT: 7.4 ML/DL
O2 SATURATION: 97.6 % (ref 92–98.5)
O2 SATURATION: 97.9 % (ref 92–98.5)
O2 SATURATION: 98.4 % (ref 92–98.5)
O2 SATURATION: 98.9 % (ref 92–98.5)
O2HB: 96.2 % (ref 94–97)
O2HB: 96.6 % (ref 94–97)
O2HB: 97.8 % (ref 94–97)
O2HB: 97.9 % (ref 94–97)
OPERATOR ID: 7278
OPERATOR ID: 7278
OPERATOR ID: ABNORMAL
OPERATOR ID: ABNORMAL
PATIENT TEMP: 37 C
PCO2: 20 MMHG (ref 35–45)
PCO2: 26.7 MMHG (ref 35–45)
PCO2: 29.9 MMHG (ref 35–45)
PCO2: 30.9 MMHG (ref 35–45)
PDW BLD-RTO: 14.2 FL (ref 11.5–15)
PDW BLD-RTO: 14.8 FL (ref 11.5–15)
PDW BLD-RTO: 14.9 FL (ref 11.5–15)
PDW BLD-RTO: 15.2 FL (ref 11.5–15)
PEEP/CPAP: 7 CMH2O
PFO2: 3.52 MMHG/%
PFO2: 3.57 MMHG/%
PFO2: 3.77 MMHG/%
PFO2: 4.71 MMHG/%
PH BLOOD GAS: 7.5 (ref 7.35–7.45)
PH BLOOD GAS: 7.5 (ref 7.35–7.45)
PH BLOOD GAS: 7.53 (ref 7.35–7.45)
PH BLOOD GAS: 7.62 (ref 7.35–7.45)
PHOSPHORUS: 4.2 MG/DL (ref 2.5–4.5)
PHOSPHORUS: 4.2 MG/DL (ref 2.5–4.5)
PHOSPHORUS: 4.5 MG/DL (ref 2.5–4.5)
PHOSPHORUS: 4.7 MG/DL (ref 2.5–4.5)
PLATELET # BLD: 103 E9/L (ref 130–450)
PLATELET # BLD: 104 E9/L (ref 130–450)
PLATELET # BLD: 50 E9/L (ref 130–450)
PLATELET # BLD: 57 E9/L (ref 130–450)
PLATELET # BLD: 86 E9/L (ref 130–450)
PLATELET CONFIRMATION: NORMAL
PMV BLD AUTO: 11.6 FL (ref 7–12)
PMV BLD AUTO: 11.7 FL (ref 7–12)
PMV BLD AUTO: 12.1 FL (ref 7–12)
PMV BLD AUTO: 13 FL (ref 7–12)
PO2: 140.7 MMHG (ref 60–100)
PO2: 142.7 MMHG (ref 60–100)
PO2: 150.7 MMHG (ref 60–100)
PO2: 282.7 MMHG (ref 60–100)
POIKILOCYTES: ABNORMAL
POLYCHROMASIA: ABNORMAL
POTASSIUM SERPL-SCNC: 4 MMOL/L (ref 3.5–5)
POTASSIUM SERPL-SCNC: 4 MMOL/L (ref 3.5–5)
POTASSIUM SERPL-SCNC: 4.2 MMOL/L (ref 3.5–5)
POTASSIUM SERPL-SCNC: 4.8 MMOL/L (ref 3.5–5)
PROTHROMBIN TIME: 20.5 SEC (ref 9.3–12.4)
R (REACTION TIME): 7.9 MIN (ref 5–10)
RBC # BLD: 1.65 E12/L (ref 3.5–5.5)
RBC # BLD: 2.24 E12/L (ref 3.5–5.5)
RBC # BLD: 2.34 E12/L (ref 3.5–5.5)
RBC # BLD: 3.04 E12/L (ref 3.5–5.5)
RI(T): 0.7
RI(T): 0.71
RI(T): 34 %
RI(T): 67 %
RR MECHANICAL: 10 B/MIN
SCHISTOCYTES: ABNORMAL
SODIUM BLD-SCNC: 138 MMOL/L (ref 132–146)
SODIUM BLD-SCNC: 138 MMOL/L (ref 132–146)
SODIUM BLD-SCNC: 139 MMOL/L (ref 132–146)
SODIUM BLD-SCNC: 140 MMOL/L (ref 132–146)
SODIUM URINE: 38 MMOL/L
SOURCE, BLOOD GAS: ABNORMAL
THB: 5.2 G/DL (ref 11.5–16.5)
THB: 6.9 G/DL (ref 11.5–16.5)
THB: 7.5 G/DL (ref 11.5–16.5)
THB: 9.6 G/DL (ref 11.5–16.5)
TIME ANALYZED: 1206
TIME ANALYZED: 13
TIME ANALYZED: 1758
TIME ANALYZED: 630
TOTAL PROTEIN: 3 G/DL (ref 6.4–8.3)
TOTAL PROTEIN: 3 G/DL (ref 6.4–8.3)
VT MECHANICAL: 400 ML
WBC # BLD: 12.2 E9/L (ref 4.5–11.5)
WBC # BLD: 12.4 E9/L (ref 4.5–11.5)
WBC # BLD: 13.2 E9/L (ref 4.5–11.5)
WBC # BLD: 13.9 E9/L (ref 4.5–11.5)

## 2019-08-30 PROCEDURE — 71045 X-RAY EXAM CHEST 1 VIEW: CPT

## 2019-08-30 PROCEDURE — 2000000000 HC ICU R&B

## 2019-08-30 PROCEDURE — P9041 ALBUMIN (HUMAN),5%, 50ML: HCPCS | Performed by: ANESTHESIOLOGIST ASSISTANT

## 2019-08-30 PROCEDURE — 2500000003 HC RX 250 WO HCPCS: Performed by: INTERNAL MEDICINE

## 2019-08-30 PROCEDURE — P9059 PLASMA, FRZ BETWEEN 8-24HOUR: HCPCS

## 2019-08-30 PROCEDURE — 85025 COMPLETE CBC W/AUTO DIFF WBC: CPT

## 2019-08-30 PROCEDURE — 85730 THROMBOPLASTIN TIME PARTIAL: CPT

## 2019-08-30 PROCEDURE — 80048 BASIC METABOLIC PNL TOTAL CA: CPT

## 2019-08-30 PROCEDURE — 3600000004 HC SURGERY LEVEL 4 BASE: Performed by: SURGERY

## 2019-08-30 PROCEDURE — 6370000000 HC RX 637 (ALT 250 FOR IP): Performed by: STUDENT IN AN ORGANIZED HEALTH CARE EDUCATION/TRAINING PROGRAM

## 2019-08-30 PROCEDURE — 82805 BLOOD GASES W/O2 SATURATION: CPT

## 2019-08-30 PROCEDURE — 85610 PROTHROMBIN TIME: CPT

## 2019-08-30 PROCEDURE — 6360000002 HC RX W HCPCS: Performed by: STUDENT IN AN ORGANIZED HEALTH CARE EDUCATION/TRAINING PROGRAM

## 2019-08-30 PROCEDURE — 85347 COAGULATION TIME ACTIVATED: CPT

## 2019-08-30 PROCEDURE — P9016 RBC LEUKOCYTES REDUCED: HCPCS

## 2019-08-30 PROCEDURE — P9035 PLATELET PHERES LEUKOREDUCED: HCPCS

## 2019-08-30 PROCEDURE — 0DB80ZZ EXCISION OF SMALL INTESTINE, OPEN APPROACH: ICD-10-PCS | Performed by: STUDENT IN AN ORGANIZED HEALTH CARE EDUCATION/TRAINING PROGRAM

## 2019-08-30 PROCEDURE — 2500000003 HC RX 250 WO HCPCS: Performed by: ANESTHESIOLOGIST ASSISTANT

## 2019-08-30 PROCEDURE — 83605 ASSAY OF LACTIC ACID: CPT

## 2019-08-30 PROCEDURE — 37799 UNLISTED PX VASCULAR SURGERY: CPT

## 2019-08-30 PROCEDURE — 7100000000 HC PACU RECOVERY - FIRST 15 MIN

## 2019-08-30 PROCEDURE — 88307 TISSUE EXAM BY PATHOLOGIST: CPT

## 2019-08-30 PROCEDURE — 85384 FIBRINOGEN ACTIVITY: CPT

## 2019-08-30 PROCEDURE — 0DBL0ZZ EXCISION OF TRANSVERSE COLON, OPEN APPROACH: ICD-10-PCS | Performed by: STUDENT IN AN ORGANIZED HEALTH CARE EDUCATION/TRAINING PROGRAM

## 2019-08-30 PROCEDURE — 7100000001 HC PACU RECOVERY - ADDTL 15 MIN

## 2019-08-30 PROCEDURE — 3700000001 HC ADD 15 MINUTES (ANESTHESIA): Performed by: SURGERY

## 2019-08-30 PROCEDURE — 82962 GLUCOSE BLOOD TEST: CPT

## 2019-08-30 PROCEDURE — 85027 COMPLETE CBC AUTOMATED: CPT

## 2019-08-30 PROCEDURE — 85576 BLOOD PLATELET AGGREGATION: CPT

## 2019-08-30 PROCEDURE — 2720000010 HC SURG SUPPLY STERILE: Performed by: SURGERY

## 2019-08-30 PROCEDURE — 80076 HEPATIC FUNCTION PANEL: CPT

## 2019-08-30 PROCEDURE — 83735 ASSAY OF MAGNESIUM: CPT

## 2019-08-30 PROCEDURE — 2580000003 HC RX 258: Performed by: INTERNAL MEDICINE

## 2019-08-30 PROCEDURE — 36415 COLL VENOUS BLD VENIPUNCTURE: CPT

## 2019-08-30 PROCEDURE — 84100 ASSAY OF PHOSPHORUS: CPT

## 2019-08-30 PROCEDURE — 84300 ASSAY OF URINE SODIUM: CPT

## 2019-08-30 PROCEDURE — 2709999900 HC NON-CHARGEABLE SUPPLY: Performed by: SURGERY

## 2019-08-30 PROCEDURE — 85049 AUTOMATED PLATELET COUNT: CPT

## 2019-08-30 PROCEDURE — 94003 VENT MGMT INPAT SUBQ DAY: CPT

## 2019-08-30 PROCEDURE — 2500000003 HC RX 250 WO HCPCS: Performed by: STUDENT IN AN ORGANIZED HEALTH CARE EDUCATION/TRAINING PROGRAM

## 2019-08-30 PROCEDURE — 2580000003 HC RX 258: Performed by: ANESTHESIOLOGIST ASSISTANT

## 2019-08-30 PROCEDURE — 36430 TRANSFUSION BLD/BLD COMPNT: CPT

## 2019-08-30 PROCEDURE — 3700000000 HC ANESTHESIA ATTENDED CARE: Performed by: SURGERY

## 2019-08-30 PROCEDURE — 6360000002 HC RX W HCPCS: Performed by: ANESTHESIOLOGIST ASSISTANT

## 2019-08-30 PROCEDURE — 99291 CRITICAL CARE FIRST HOUR: CPT | Performed by: SURGERY

## 2019-08-30 PROCEDURE — 82570 ASSAY OF URINE CREATININE: CPT

## 2019-08-30 PROCEDURE — 3600000014 HC SURGERY LEVEL 4 ADDTL 15MIN: Performed by: SURGERY

## 2019-08-30 PROCEDURE — 82330 ASSAY OF CALCIUM: CPT

## 2019-08-30 PROCEDURE — 80053 COMPREHEN METABOLIC PANEL: CPT

## 2019-08-30 PROCEDURE — 2580000003 HC RX 258: Performed by: STUDENT IN AN ORGANIZED HEALTH CARE EDUCATION/TRAINING PROGRAM

## 2019-08-30 RX ORDER — 0.9 % SODIUM CHLORIDE 0.9 %
250 INTRAVENOUS SOLUTION INTRAVENOUS ONCE
Status: DISCONTINUED | OUTPATIENT
Start: 2019-08-30 | End: 2019-08-30

## 2019-08-30 RX ORDER — MAGNESIUM SULFATE IN WATER 40 MG/ML
2 INJECTION, SOLUTION INTRAVENOUS ONCE
Status: COMPLETED | OUTPATIENT
Start: 2019-08-30 | End: 2019-08-30

## 2019-08-30 RX ORDER — ROCURONIUM BROMIDE 10 MG/ML
INJECTION, SOLUTION INTRAVENOUS PRN
Status: DISCONTINUED | OUTPATIENT
Start: 2019-08-30 | End: 2019-08-30 | Stop reason: SDUPTHER

## 2019-08-30 RX ORDER — PROPOFOL 10 MG/ML
10 INJECTION, EMULSION INTRAVENOUS
Status: DISCONTINUED | OUTPATIENT
Start: 2019-08-30 | End: 2019-08-31

## 2019-08-30 RX ORDER — FENTANYL CITRATE 50 UG/ML
INJECTION, SOLUTION INTRAMUSCULAR; INTRAVENOUS PRN
Status: DISCONTINUED | OUTPATIENT
Start: 2019-08-30 | End: 2019-08-30 | Stop reason: SDUPTHER

## 2019-08-30 RX ORDER — SODIUM CHLORIDE, SODIUM LACTATE, POTASSIUM CHLORIDE, CALCIUM CHLORIDE 600; 310; 30; 20 MG/100ML; MG/100ML; MG/100ML; MG/100ML
INJECTION, SOLUTION INTRAVENOUS CONTINUOUS
Status: DISCONTINUED | OUTPATIENT
Start: 2019-08-30 | End: 2019-09-01

## 2019-08-30 RX ORDER — ALBUMIN, HUMAN INJ 5% 5 %
SOLUTION INTRAVENOUS PRN
Status: DISCONTINUED | OUTPATIENT
Start: 2019-08-30 | End: 2019-08-30 | Stop reason: SDUPTHER

## 2019-08-30 RX ORDER — 0.9 % SODIUM CHLORIDE 0.9 %
250 INTRAVENOUS SOLUTION INTRAVENOUS ONCE
Status: COMPLETED | OUTPATIENT
Start: 2019-08-30 | End: 2019-08-30

## 2019-08-30 RX ORDER — SODIUM CHLORIDE 9 MG/ML
INJECTION, SOLUTION INTRAVENOUS CONTINUOUS PRN
Status: DISCONTINUED | OUTPATIENT
Start: 2019-08-30 | End: 2019-08-30 | Stop reason: SDUPTHER

## 2019-08-30 RX ORDER — FENTANYL CITRATE 50 UG/ML
25 INJECTION, SOLUTION INTRAMUSCULAR; INTRAVENOUS
Status: DISCONTINUED | OUTPATIENT
Start: 2019-08-30 | End: 2019-08-30

## 2019-08-30 RX ORDER — ALBUMIN (HUMAN) 12.5 G/50ML
SOLUTION INTRAVENOUS
Status: DISCONTINUED
Start: 2019-08-30 | End: 2019-08-30 | Stop reason: WASHOUT

## 2019-08-30 RX ADMIN — PIPERACILLIN SODIUM AND TAZOBACTAM SODIUM 3.38 G: 3; .375 INJECTION, POWDER, LYOPHILIZED, FOR SOLUTION INTRAVENOUS at 21:18

## 2019-08-30 RX ADMIN — CHLORHEXIDINE GLUCONATE 0.12% ORAL RINSE 15 ML: 1.2 LIQUID ORAL at 21:18

## 2019-08-30 RX ADMIN — LATANOPROST 1 DROP: 50 SOLUTION OPHTHALMIC at 21:26

## 2019-08-30 RX ADMIN — SODIUM CHLORIDE: 9 INJECTION, SOLUTION INTRAVENOUS at 07:48

## 2019-08-30 RX ADMIN — PIPERACILLIN SODIUM AND TAZOBACTAM SODIUM 3.38 G: 3; .375 INJECTION, POWDER, LYOPHILIZED, FOR SOLUTION INTRAVENOUS at 14:18

## 2019-08-30 RX ADMIN — PROPOFOL 10 MCG/KG/MIN: 10 INJECTION, EMULSION INTRAVENOUS at 16:59

## 2019-08-30 RX ADMIN — PIPERACILLIN SODIUM AND TAZOBACTAM SODIUM 3.38 G: 3; .375 INJECTION, POWDER, LYOPHILIZED, FOR SOLUTION INTRAVENOUS at 06:13

## 2019-08-30 RX ADMIN — SODIUM CHLORIDE, POTASSIUM CHLORIDE, SODIUM LACTATE AND CALCIUM CHLORIDE: 600; 310; 30; 20 INJECTION, SOLUTION INTRAVENOUS at 21:55

## 2019-08-30 RX ADMIN — ROCURONIUM BROMIDE 50 MG: 10 INJECTION, SOLUTION INTRAVENOUS at 07:57

## 2019-08-30 RX ADMIN — FENTANYL CITRATE 50 MCG: 50 INJECTION, SOLUTION INTRAMUSCULAR; INTRAVENOUS at 08:11

## 2019-08-30 RX ADMIN — HYDROCORTISONE SODIUM SUCCINATE 100 MG: 100 INJECTION, POWDER, FOR SOLUTION INTRAMUSCULAR; INTRAVENOUS at 21:18

## 2019-08-30 RX ADMIN — SODIUM CHLORIDE 250 ML: 9 INJECTION, SOLUTION INTRAVENOUS at 19:15

## 2019-08-30 RX ADMIN — MAGNESIUM SULFATE 2 G: 2 INJECTION INTRAVENOUS at 01:19

## 2019-08-30 RX ADMIN — HYDROCORTISONE SODIUM SUCCINATE 100 MG: 100 INJECTION, POWDER, FOR SOLUTION INTRAMUSCULAR; INTRAVENOUS at 05:38

## 2019-08-30 RX ADMIN — CALCIUM GLUCONATE 2 G: 98 INJECTION, SOLUTION INTRAVENOUS at 02:02

## 2019-08-30 RX ADMIN — ALBUMIN (HUMAN) 25 G: 12.5 INJECTION, SOLUTION INTRAVENOUS at 08:01

## 2019-08-30 RX ADMIN — FLUCONAZOLE 400 MG: 400 INJECTION, SOLUTION INTRAVENOUS at 11:18

## 2019-08-30 RX ADMIN — SODIUM BICARBONATE: 84 INJECTION, SOLUTION INTRAVENOUS at 02:02

## 2019-08-30 RX ADMIN — SODIUM CHLORIDE, POTASSIUM CHLORIDE, SODIUM LACTATE AND CALCIUM CHLORIDE: 600; 310; 30; 20 INJECTION, SOLUTION INTRAVENOUS at 14:18

## 2019-08-30 RX ADMIN — CALCIUM GLUCONATE 4 G: 98 INJECTION, SOLUTION INTRAVENOUS at 20:17

## 2019-08-30 RX ADMIN — FENTANYL CITRATE 50 MCG: 50 INJECTION, SOLUTION INTRAMUSCULAR; INTRAVENOUS at 08:13

## 2019-08-30 RX ADMIN — FENTANYL CITRATE 25 MCG: 50 INJECTION INTRAMUSCULAR; INTRAVENOUS at 12:53

## 2019-08-30 RX ADMIN — SODIUM CHLORIDE 250 ML: 9 INJECTION, SOLUTION INTRAVENOUS at 02:30

## 2019-08-30 RX ADMIN — FENTANYL CITRATE 25 MCG: 50 INJECTION INTRAMUSCULAR; INTRAVENOUS at 16:16

## 2019-08-30 RX ADMIN — Medication 10 ML: at 21:18

## 2019-08-30 RX ADMIN — FENTANYL CITRATE 25 MCG: 50 INJECTION INTRAMUSCULAR; INTRAVENOUS at 06:38

## 2019-08-30 RX ADMIN — PIPERACILLIN SODIUM AND TAZOBACTAM SODIUM 3.38 G: 3; .375 INJECTION, POWDER, LYOPHILIZED, FOR SOLUTION INTRAVENOUS at 07:48

## 2019-08-30 RX ADMIN — INSULIN LISPRO 2 UNITS: 100 INJECTION, SOLUTION INTRAVENOUS; SUBCUTANEOUS at 00:12

## 2019-08-30 RX ADMIN — HYDROCORTISONE SODIUM SUCCINATE 100 MG: 100 INJECTION, POWDER, FOR SOLUTION INTRAMUSCULAR; INTRAVENOUS at 14:17

## 2019-08-30 RX ADMIN — Medication 25 MCG/HR: at 16:59

## 2019-08-30 ASSESSMENT — PULMONARY FUNCTION TESTS
PIF_VALUE: 29
PIF_VALUE: 30
PIF_VALUE: 34
PIF_VALUE: 29
PIF_VALUE: 29
PIF_VALUE: 30
PIF_VALUE: 23
PIF_VALUE: 22
PIF_VALUE: 30
PIF_VALUE: 21
PIF_VALUE: 28
PIF_VALUE: 30
PIF_VALUE: 31
PIF_VALUE: 30
PIF_VALUE: 33
PIF_VALUE: 33
PIF_VALUE: 32
PIF_VALUE: 29
PIF_VALUE: 30
PIF_VALUE: 29
PIF_VALUE: 29
PIF_VALUE: 28
PIF_VALUE: 35
PIF_VALUE: 34
PIF_VALUE: 29
PIF_VALUE: 28
PIF_VALUE: 23
PIF_VALUE: 31
PIF_VALUE: 30
PIF_VALUE: 31
PIF_VALUE: 30
PIF_VALUE: 28
PIF_VALUE: 20
PIF_VALUE: 28
PIF_VALUE: 29
PIF_VALUE: 20
PIF_VALUE: 19
PIF_VALUE: 29
PIF_VALUE: 22
PIF_VALUE: 28
PIF_VALUE: 29
PIF_VALUE: 29
PIF_VALUE: 22
PIF_VALUE: 34
PIF_VALUE: 0
PIF_VALUE: 29
PIF_VALUE: 28
PIF_VALUE: 29
PIF_VALUE: 23
PIF_VALUE: 31
PIF_VALUE: 30
PIF_VALUE: 32
PIF_VALUE: 29
PIF_VALUE: 29
PIF_VALUE: 32
PIF_VALUE: 30
PIF_VALUE: 24
PIF_VALUE: 28
PIF_VALUE: 30
PIF_VALUE: 31
PIF_VALUE: 30
PIF_VALUE: 29
PIF_VALUE: 29
PIF_VALUE: 30
PIF_VALUE: 24
PIF_VALUE: 29
PIF_VALUE: 29
PIF_VALUE: 4
PIF_VALUE: 27
PIF_VALUE: 35
PIF_VALUE: 29
PIF_VALUE: 31
PIF_VALUE: 29
PIF_VALUE: 29
PIF_VALUE: 28
PIF_VALUE: 30
PIF_VALUE: 32
PIF_VALUE: 30
PIF_VALUE: 29
PIF_VALUE: 1
PIF_VALUE: 25
PIF_VALUE: 29
PIF_VALUE: 29
PIF_VALUE: 31
PIF_VALUE: 25
PIF_VALUE: 29
PIF_VALUE: 30
PIF_VALUE: 29
PIF_VALUE: 27
PIF_VALUE: 28
PIF_VALUE: 32
PIF_VALUE: 30
PIF_VALUE: 30
PIF_VALUE: 25
PIF_VALUE: 29
PIF_VALUE: 31
PIF_VALUE: 31
PIF_VALUE: 29
PIF_VALUE: 30
PIF_VALUE: 29

## 2019-08-30 ASSESSMENT — PAIN SCALES - GENERAL
PAINLEVEL_OUTOF10: 0

## 2019-08-30 ASSESSMENT — LIFESTYLE VARIABLES: SMOKING_STATUS: 0

## 2019-08-30 NOTE — PROGRESS NOTES
membranes. No ulcerations or thrush. Neck: Supple to movements. Chest: No use of accessory muscles to breathe. Symmetrical expansion. No wheezing, crackles or rhonchi. Cardiovascular: S1 and S2 are rhythmic and regular. No murmurs appreciated. Abdomen: -bowel sounds to auscultation. Them is open postop. No masses felt. No hepatosplenomegaly. Extremities: No clubbing, no cyanosis, no edema.   Lines: peripheral              Art line 8-28-19              CBC triple-lumen catheter 8/28/2019  Laboratory and Tests Review:  Lab Results   Component Value Date    WBC 13.2 (H) 08/30/2019    WBC 13.9 (H) 08/30/2019    WBC 12.2 (H) 08/30/2019    HGB 6.9 (L) 08/30/2019    HCT 20.1 (L) 08/30/2019    MCV 85.9 08/30/2019     (L) 08/30/2019     (L) 08/30/2019     Lab Results   Component Value Date    NEUTROABS 12.37 (H) 08/30/2019    NEUTROABS 14.74 (H) 08/29/2019    NEUTROABS 16.20 (H) 08/28/2019     No results found for: [facility]  Lab Results   Component Value Date     (H) 08/30/2019     (H) 08/30/2019     (H) 08/30/2019     (H) 08/30/2019    ALKPHOS 53 08/30/2019    ALKPHOS 52 08/30/2019    BILITOT 0.5 08/30/2019    BILITOT 0.5 08/30/2019     Lab Results   Component Value Date     08/30/2019    K 4.2 08/30/2019    K 3.8 08/28/2019     08/30/2019    CO2 23 08/30/2019    BUN 30 08/30/2019    CREATININE 2.0 08/30/2019    CREATININE 2.1 08/30/2019    CREATININE 1.7 08/30/2019    GFRAA 29 08/30/2019    LABGLOM 29 08/30/2019    GLUCOSE 149 08/30/2019    GLUCOSE 121 11/29/2011    PROT 3.0 08/30/2019    PROT 3.0 08/30/2019    LABALBU 1.5 08/30/2019    LABALBU 1.4 08/30/2019    LABALBU 3.3 11/29/2011    CALCIUM 7.2 08/30/2019    BILITOT 0.5 08/30/2019    BILITOT 0.5 08/30/2019    ALKPHOS 53 08/30/2019    ALKPHOS 52 08/30/2019     08/30/2019     08/30/2019     08/30/2019     08/30/2019     Lab Results   Component Value Date    CRP 21.3 (H) 08/28/2019

## 2019-08-30 NOTE — PLAN OF CARE
Problem: Falls - Risk of:  Goal: Will remain free from falls  Description  Will remain free from falls  8/29/2019 2103 by Chace Gonzales RN  Outcome: Met This Shift     Problem: Falls - Risk of:  Goal: Absence of physical injury  Description  Absence of physical injury  8/29/2019 2103 by Chace Gonzales RN  Outcome: Met This Shift     Problem: Risk for Impaired Skin Integrity  Goal: Tissue integrity - skin and mucous membranes  Description  Structural intactness and normal physiological function of skin and  mucous membranes.   8/29/2019 2103 by Chace Gonzales RN  Outcome: Met This Shift     Problem: Restraint Use - Nonviolent/Non-Self-Destructive Behavior:  Goal: Absence of restraint-related injury  Description  Absence of restraint-related injury  8/29/2019 2103 by Chace Gonzales RN  Outcome: Met This Shift     Problem: Restraint Use - Nonviolent/Non-Self-Destructive Behavior:  Goal: Absence of restraint indications  Description  Absence of restraint indications  8/29/2019 2103 by Chace Gonzales RN  Outcome: Not Met This Shift

## 2019-08-30 NOTE — PLAN OF CARE
Problem: Falls - Risk of:  Goal: Will remain free from falls  Description  Will remain free from falls  8/29/2019 2338 by Kalie Newell RN  Outcome: Met This Shift     Problem: Falls - Risk of:  Goal: Absence of physical injury  Description  Absence of physical injury  8/29/2019 2338 by Kalie Newell RN  Outcome: Met This Shift     Problem: Pain:  Goal: Pain level will decrease  Description  Pain level will decrease  8/29/2019 2338 by Kalie Newell RN  Outcome: Met This Shift     Problem: Pain:  Goal: Control of acute pain  Description  Control of acute pain  Outcome: Met This Shift

## 2019-08-30 NOTE — PROGRESS NOTES
Vascular Surgery Progress Note    Pt is being seen in f/u today regarding SMA angioplasty    Subjective  Pt s/e.  requred 2 U pRBC, 4 L crystalloid, slightly improved pressor requirements    Current Medications:    norepinephrine 10 mcg/min (08/30/19 0656)    IV infusion builder 125 mL/hr at 08/30/19 0202    vasopressin (Septic Shock) infusion Stopped (08/30/19 0606)    dextrose      propofol Stopped (08/29/19 1300)    [Held by provider] heparin (porcine) Stopped (08/30/19 0331)    dextrose        fentanNYL, lubrifresh P.M., [Held by provider] heparin (porcine), [Held by provider] heparin (porcine), sodium chloride flush, glucose, dextrose, glucagon (rDNA), dextrose, labetalol    lactated ringers bolus  1,000 mL Intravenous Once    insulin lispro  0-12 Units Subcutaneous Q4H    pantoprazole  40 mg Intravenous Daily    And    sodium chloride (PF)  10 mL Intravenous Daily    vasopressin        hydrocortisone sodium succinate PF  100 mg Intravenous Q8H    piperacillin-tazobactam  3.375 g Intravenous Q8H    fluconazole  400 mg Intravenous Q24H    latanoprost  1 drop Both Eyes Nightly    [Held by provider] verapamil  80 mg Per G Tube 3 times per day    chlorhexidine  15 mL Mouth/Throat BID    sodium chloride flush  10 mL Intravenous 2 times per day        PHYSICAL EXAM:    BP (!) 141/82   Pulse 104   Temp 98.9 °F (37.2 °C) (Esophageal)   Resp 16   Ht 5' 3\" (1.6 m)   Wt 129 lb 4.8 oz (58.7 kg)   SpO2 98%   BMI 22.90 kg/m²     Intake/Output Summary (Last 24 hours) at 8/30/2019 0700  Last data filed at 8/30/2019 0600  Gross per 24 hour   Intake 42797.66 ml   Output 876 ml   Net 9887.66 ml          Gen: awake, follows commands  CVS: RRR, vaso 0.02, levo 10  Resp: intubated  Abd: Soft, diffusely-tender, non distended, bloody output 700 cc from wound vac  R LE: 2+ fem pulse, no hematoma from access site  L LE: 2 + fem pulse    LABS:    Lab Results   Component Value Date    WBC 13.9 (H) 08/30/2019

## 2019-08-31 ENCOUNTER — APPOINTMENT (OUTPATIENT)
Dept: GENERAL RADIOLOGY | Age: 84
DRG: 853 | End: 2019-08-31
Payer: COMMERCIAL

## 2019-08-31 LAB
AADO2: 103.1 MMHG
ALBUMIN SERPL-MCNC: 2 G/DL (ref 3.5–5.2)
ALP BLD-CCNC: 40 U/L (ref 35–104)
ALT SERPL-CCNC: 112 U/L (ref 0–32)
ANION GAP SERPL CALCULATED.3IONS-SCNC: 11 MMOL/L (ref 7–16)
ANION GAP SERPL CALCULATED.3IONS-SCNC: 13 MMOL/L (ref 7–16)
AST SERPL-CCNC: 191 U/L (ref 0–31)
B.E.: 0.6 MMOL/L (ref -3–3)
BASOPHILS ABSOLUTE: 0.02 E9/L (ref 0–0.2)
BASOPHILS RELATIVE PERCENT: 0.1 % (ref 0–2)
BILIRUB SERPL-MCNC: 0.8 MG/DL (ref 0–1.2)
BUN BLDV-MCNC: 34 MG/DL (ref 8–23)
BUN BLDV-MCNC: 37 MG/DL (ref 8–23)
CALCIUM IONIZED: 1.13 MMOL/L (ref 1.15–1.33)
CALCIUM IONIZED: 1.17 MMOL/L (ref 1.15–1.33)
CALCIUM SERPL-MCNC: 7.6 MG/DL (ref 8.6–10.2)
CALCIUM SERPL-MCNC: 7.8 MG/DL (ref 8.6–10.2)
CHLORIDE BLD-SCNC: 101 MMOL/L (ref 98–107)
CHLORIDE BLD-SCNC: 102 MMOL/L (ref 98–107)
CO2: 23 MMOL/L (ref 22–29)
CO2: 26 MMOL/L (ref 22–29)
COHB: 0.2 % (ref 0–1.5)
CREAT SERPL-MCNC: 2.5 MG/DL (ref 0.5–1)
CREAT SERPL-MCNC: 2.7 MG/DL (ref 0.5–1)
CRITICAL: ABNORMAL
DATE ANALYZED: ABNORMAL
DATE OF COLLECTION: ABNORMAL
EOSINOPHILS ABSOLUTE: 0 E9/L (ref 0.05–0.5)
EOSINOPHILS RELATIVE PERCENT: 0 % (ref 0–6)
FIO2: 40 %
GFR AFRICAN AMERICAN: 20
GFR AFRICAN AMERICAN: 22
GFR NON-AFRICAN AMERICAN: 20 ML/MIN/1.73
GFR NON-AFRICAN AMERICAN: 22 ML/MIN/1.73
GLUCOSE BLD-MCNC: 144 MG/DL (ref 74–99)
GLUCOSE BLD-MCNC: 146 MG/DL (ref 74–99)
HCO3: 23.9 MMOL/L (ref 22–26)
HCT VFR BLD CALC: 18.3 % (ref 34–48)
HCT VFR BLD CALC: 23.6 % (ref 34–48)
HCT VFR BLD CALC: 34 % (ref 34–48)
HEMOGLOBIN: 12 G/DL (ref 11.5–15.5)
HEMOGLOBIN: 6.4 G/DL (ref 11.5–15.5)
HEMOGLOBIN: 8.4 G/DL (ref 11.5–15.5)
HHB: 1.9 % (ref 0–5)
IMMATURE GRANULOCYTES #: 0.23 E9/L
IMMATURE GRANULOCYTES %: 1.3 % (ref 0–5)
LAB: ABNORMAL
LACTIC ACID, SEPSIS: 1.2 MMOL/L (ref 0.5–1.9)
LACTIC ACID, SEPSIS: 1.9 MMOL/L (ref 0.5–1.9)
LYMPHOCYTES ABSOLUTE: 0.77 E9/L (ref 1.5–4)
LYMPHOCYTES RELATIVE PERCENT: 4.3 % (ref 20–42)
Lab: ABNORMAL
MAGNESIUM: 1.9 MG/DL (ref 1.6–2.6)
MAGNESIUM: 1.9 MG/DL (ref 1.6–2.6)
MCH RBC QN AUTO: 29.6 PG (ref 26–35)
MCH RBC QN AUTO: 29.7 PG (ref 26–35)
MCHC RBC AUTO-ENTMCNC: 35 % (ref 32–34.5)
MCHC RBC AUTO-ENTMCNC: 35.6 % (ref 32–34.5)
MCV RBC AUTO: 83.4 FL (ref 80–99.9)
MCV RBC AUTO: 84.7 FL (ref 80–99.9)
METER GLUCOSE: 105 MG/DL (ref 74–99)
METER GLUCOSE: 132 MG/DL (ref 74–99)
METER GLUCOSE: 141 MG/DL (ref 74–99)
METER GLUCOSE: 73 MG/DL (ref 74–99)
METHB: 0.8 % (ref 0–1.5)
MODE: AC
MONOCYTES ABSOLUTE: 1.12 E9/L (ref 0.1–0.95)
MONOCYTES RELATIVE PERCENT: 6.2 % (ref 2–12)
NEUTROPHILS ABSOLUTE: 15.79 E9/L (ref 1.8–7.3)
NEUTROPHILS RELATIVE PERCENT: 88.1 % (ref 43–80)
O2 CONTENT: 9.3 ML/DL
O2 SATURATION: 98.1 % (ref 92–98.5)
O2HB: 97.1 % (ref 94–97)
OPERATOR ID: 2464
PATIENT TEMP: 37 C
PCO2: 32 MMHG (ref 35–45)
PDW BLD-RTO: 14.1 FL (ref 11.5–15)
PDW BLD-RTO: 14.3 FL (ref 11.5–15)
PEEP/CPAP: 7 CMH2O
PFO2: 3.38 MMHG/%
PH BLOOD GAS: 7.49 (ref 7.35–7.45)
PHOSPHORUS: 4.7 MG/DL (ref 2.5–4.5)
PHOSPHORUS: 5.3 MG/DL (ref 2.5–4.5)
PLATELET # BLD: 58 E9/L (ref 130–450)
PLATELET # BLD: 76 E9/L (ref 130–450)
PLATELET CONFIRMATION: NORMAL
PLATELET CONFIRMATION: NORMAL
PMV BLD AUTO: 12.3 FL (ref 7–12)
PMV BLD AUTO: 12.5 FL (ref 7–12)
PO2: 135.3 MMHG (ref 60–100)
POTASSIUM SERPL-SCNC: 4.4 MMOL/L (ref 3.5–5)
POTASSIUM SERPL-SCNC: 4.5 MMOL/L (ref 3.5–5)
RBC # BLD: 2.16 E12/L (ref 3.5–5.5)
RBC # BLD: 2.83 E12/L (ref 3.5–5.5)
RI(T): 76 %
RR MECHANICAL: 10 B/MIN
SODIUM BLD-SCNC: 138 MMOL/L (ref 132–146)
SODIUM BLD-SCNC: 138 MMOL/L (ref 132–146)
SOURCE, BLOOD GAS: ABNORMAL
THB: 6.6 G/DL (ref 11.5–16.5)
TIME ANALYZED: 359
TOTAL PROTEIN: 3.5 G/DL (ref 6.4–8.3)
VT MECHANICAL: 400 ML
WBC # BLD: 14.3 E9/L (ref 4.5–11.5)
WBC # BLD: 17.9 E9/L (ref 4.5–11.5)

## 2019-08-31 PROCEDURE — 85014 HEMATOCRIT: CPT

## 2019-08-31 PROCEDURE — 2580000003 HC RX 258: Performed by: STUDENT IN AN ORGANIZED HEALTH CARE EDUCATION/TRAINING PROGRAM

## 2019-08-31 PROCEDURE — C9113 INJ PANTOPRAZOLE SODIUM, VIA: HCPCS | Performed by: STUDENT IN AN ORGANIZED HEALTH CARE EDUCATION/TRAINING PROGRAM

## 2019-08-31 PROCEDURE — 2580000003 HC RX 258

## 2019-08-31 PROCEDURE — 82962 GLUCOSE BLOOD TEST: CPT

## 2019-08-31 PROCEDURE — 94003 VENT MGMT INPAT SUBQ DAY: CPT

## 2019-08-31 PROCEDURE — 85018 HEMOGLOBIN: CPT

## 2019-08-31 PROCEDURE — 99233 SBSQ HOSP IP/OBS HIGH 50: CPT | Performed by: INTERNAL MEDICINE

## 2019-08-31 PROCEDURE — 36430 TRANSFUSION BLD/BLD COMPNT: CPT

## 2019-08-31 PROCEDURE — 84100 ASSAY OF PHOSPHORUS: CPT

## 2019-08-31 PROCEDURE — 83735 ASSAY OF MAGNESIUM: CPT

## 2019-08-31 PROCEDURE — 6360000002 HC RX W HCPCS: Performed by: INTERNAL MEDICINE

## 2019-08-31 PROCEDURE — 6360000002 HC RX W HCPCS: Performed by: STUDENT IN AN ORGANIZED HEALTH CARE EDUCATION/TRAINING PROGRAM

## 2019-08-31 PROCEDURE — 85025 COMPLETE CBC W/AUTO DIFF WBC: CPT

## 2019-08-31 PROCEDURE — P9016 RBC LEUKOCYTES REDUCED: HCPCS

## 2019-08-31 PROCEDURE — 82330 ASSAY OF CALCIUM: CPT

## 2019-08-31 PROCEDURE — 83605 ASSAY OF LACTIC ACID: CPT

## 2019-08-31 PROCEDURE — 85027 COMPLETE CBC AUTOMATED: CPT

## 2019-08-31 PROCEDURE — 2500000003 HC RX 250 WO HCPCS: Performed by: INTERNAL MEDICINE

## 2019-08-31 PROCEDURE — 2580000003 HC RX 258: Performed by: INTERNAL MEDICINE

## 2019-08-31 PROCEDURE — 36415 COLL VENOUS BLD VENIPUNCTURE: CPT

## 2019-08-31 PROCEDURE — 71045 X-RAY EXAM CHEST 1 VIEW: CPT

## 2019-08-31 PROCEDURE — 6370000000 HC RX 637 (ALT 250 FOR IP): Performed by: STUDENT IN AN ORGANIZED HEALTH CARE EDUCATION/TRAINING PROGRAM

## 2019-08-31 PROCEDURE — 80048 BASIC METABOLIC PNL TOTAL CA: CPT

## 2019-08-31 PROCEDURE — 80053 COMPREHEN METABOLIC PANEL: CPT

## 2019-08-31 PROCEDURE — 99291 CRITICAL CARE FIRST HOUR: CPT | Performed by: SURGERY

## 2019-08-31 PROCEDURE — 2000000000 HC ICU R&B

## 2019-08-31 PROCEDURE — 82805 BLOOD GASES W/O2 SATURATION: CPT

## 2019-08-31 RX ORDER — HYDRALAZINE HYDROCHLORIDE 20 MG/ML
10 INJECTION INTRAMUSCULAR; INTRAVENOUS EVERY 4 HOURS PRN
Status: DISCONTINUED | OUTPATIENT
Start: 2019-08-31 | End: 2019-08-31

## 2019-08-31 RX ORDER — BUMETANIDE 0.25 MG/ML
1 INJECTION, SOLUTION INTRAMUSCULAR; INTRAVENOUS ONCE
Status: COMPLETED | OUTPATIENT
Start: 2019-08-31 | End: 2019-08-31

## 2019-08-31 RX ORDER — 0.9 % SODIUM CHLORIDE 0.9 %
250 INTRAVENOUS SOLUTION INTRAVENOUS ONCE
Status: COMPLETED | OUTPATIENT
Start: 2019-08-31 | End: 2019-08-31

## 2019-08-31 RX ADMIN — HYDRALAZINE HYDROCHLORIDE 10 MG: 20 INJECTION, SOLUTION INTRAMUSCULAR; INTRAVENOUS at 11:10

## 2019-08-31 RX ADMIN — Medication 10 ML: at 08:45

## 2019-08-31 RX ADMIN — HYDROCORTISONE SODIUM SUCCINATE 100 MG: 100 INJECTION, POWDER, FOR SOLUTION INTRAMUSCULAR; INTRAVENOUS at 04:56

## 2019-08-31 RX ADMIN — PANTOPRAZOLE SODIUM 40 MG: 40 INJECTION, POWDER, FOR SOLUTION INTRAVENOUS at 08:45

## 2019-08-31 RX ADMIN — Medication 10 ML: at 21:45

## 2019-08-31 RX ADMIN — WATER 10 ML: 1 INJECTION INTRAMUSCULAR; INTRAVENOUS; SUBCUTANEOUS at 12:16

## 2019-08-31 RX ADMIN — Medication 10 ML: at 04:57

## 2019-08-31 RX ADMIN — SODIUM CHLORIDE, POTASSIUM CHLORIDE, SODIUM LACTATE AND CALCIUM CHLORIDE: 600; 310; 30; 20 INJECTION, SOLUTION INTRAVENOUS at 05:33

## 2019-08-31 RX ADMIN — PIPERACILLIN SODIUM AND TAZOBACTAM SODIUM 3.38 G: 3; .375 INJECTION, POWDER, LYOPHILIZED, FOR SOLUTION INTRAVENOUS at 05:49

## 2019-08-31 RX ADMIN — BUMETANIDE 1 MG: 0.25 INJECTION INTRAMUSCULAR; INTRAVENOUS at 09:07

## 2019-08-31 RX ADMIN — PIPERACILLIN SODIUM AND TAZOBACTAM SODIUM 3.38 G: 3; .375 INJECTION, POWDER, LYOPHILIZED, FOR SOLUTION INTRAVENOUS at 14:01

## 2019-08-31 RX ADMIN — WATER 10 ML: 1 INJECTION INTRAMUSCULAR; INTRAVENOUS; SUBCUTANEOUS at 04:56

## 2019-08-31 RX ADMIN — INSULIN LISPRO 2 UNITS: 100 INJECTION, SOLUTION INTRAVENOUS; SUBCUTANEOUS at 08:16

## 2019-08-31 RX ADMIN — Medication 10 ML: at 10:24

## 2019-08-31 RX ADMIN — SODIUM CHLORIDE 250 ML: 9 INJECTION, SOLUTION INTRAVENOUS at 09:15

## 2019-08-31 RX ADMIN — FLUCONAZOLE 400 MG: 400 INJECTION, SOLUTION INTRAVENOUS at 09:47

## 2019-08-31 RX ADMIN — HYDROCORTISONE SODIUM SUCCINATE 100 MG: 100 INJECTION, POWDER, FOR SOLUTION INTRAMUSCULAR; INTRAVENOUS at 12:17

## 2019-08-31 RX ADMIN — HYDROCORTISONE SODIUM SUCCINATE 100 MG: 100 INJECTION, POWDER, FOR SOLUTION INTRAMUSCULAR; INTRAVENOUS at 21:45

## 2019-08-31 RX ADMIN — SODIUM CHLORIDE 250 ML: 9 INJECTION, SOLUTION INTRAVENOUS at 04:18

## 2019-08-31 RX ADMIN — CHLORHEXIDINE GLUCONATE 0.12% ORAL RINSE 15 ML: 1.2 LIQUID ORAL at 08:45

## 2019-08-31 ASSESSMENT — PAIN SCALES - GENERAL
PAINLEVEL_OUTOF10: 0
PAINLEVEL_OUTOF10: 2
PAINLEVEL_OUTOF10: 0
PAINLEVEL_OUTOF10: 5
PAINLEVEL_OUTOF10: 0

## 2019-08-31 ASSESSMENT — PULMONARY FUNCTION TESTS
PIF_VALUE: 25
PIF_VALUE: 28
PIF_VALUE: 29
PIF_VALUE: 14
PIF_VALUE: 29
PIF_VALUE: 27
PIF_VALUE: 28
PIF_VALUE: 29
PIF_VALUE: 29
PIF_VALUE: 27
PIF_VALUE: 28
PIF_VALUE: 28
PIF_VALUE: 27
PIF_VALUE: 28
PIF_VALUE: 25
PIF_VALUE: 27
PIF_VALUE: 26
PIF_VALUE: 30
PIF_VALUE: 29
PIF_VALUE: 25
PIF_VALUE: 27
PIF_VALUE: 28
PIF_VALUE: 27
PIF_VALUE: 26
PIF_VALUE: 29
PIF_VALUE: 27
PIF_VALUE: 24
PIF_VALUE: 15
PIF_VALUE: 29
PIF_VALUE: 23
PIF_VALUE: 27
PIF_VALUE: 27
PIF_VALUE: 26
PIF_VALUE: 25

## 2019-08-31 NOTE — PROGRESS NOTES
Nephrology Attending   Inpatient Progress Note    Admit Date: 2019                                  PCP: Kandy Huynh MD    Patient Active Problem List   Diagnosis    HTN (hypertension)    Osteoporosis    [facility]    Glaucoma    Palliative care encounter    Fracture of hip, R, comminuted    PVD [facility]peripheral vascular disease) [facility]    History of tobacco abuse    Stage 3 chronic kidney disease ([facility]    Closed compression fracture of lumbar vertebra ([facility]    Closed compression fracture of lumbar vertebra (HCC)    Iron deficiency anemia due to chronic blood loss    Closed compression fracture of lumbar vertebra ([facility]    Type 2 diabetes mellitus with hyperglycemia, without long-term current use of insulin (HCC)    Osteoarthritis    Chest pain    Abdominal pain    Constipation    Anemia    Lactic acidosis    Osteopenia    Weight loss, unintentional    Closed left hip fracture, initial encounter ([facility]    Mitral valve insufficiency    Pneumatosis intestinalis    Enterocolitis    Leukocytosis    Acute respiratory failure (HCC)       Subjective:      : 2nd look lap with near total bowel resection  :off pressors; potential arrival of niece today  :Extubated ; in no distress; no lab draws this am        Vitals:  VITALS:  BP (!) 190/72   Pulse (!) 46   Temp 98.4 °F (36.9 °C) (Axillary)   Resp 10   Ht 5' 3\" (1.6 m)   Wt 129 lb 4.8 oz (58.7 kg)   SpO2 100%   BMI 22.90 kg/m²   24HR INTAKE/OUTPUT:      Intake/Output Summary (Last 24 hours) at 2019 0807  Last data filed at 2019 0545  Gross per 24 hour   Intake 4693 ml   Output 2167 ml   Net 2526 ml     CURRENT PULSE OXIMETRY:  SpO2: 100 %  24HR PULSE OXIMETRY RANGE:  SpO2  Av.8 %  Min: 95 %  Max: 100 %        I/O:      I/O last 3 completed shifts:   In: 7493 [I.V.:2748; Blood:1403; IV Piggyback:542]  Out: 2167 [Urine:267; Emesis/NG output:700; Drains:1200]  No intake/output data recorded.     Medications:    IV:   lactated ringers 125 mL/hr at 08/31/19 0533    propofol 10 mcg/kg/min (08/30/19 1659)    fentaNYL 5 mcg/ml in 0.9%  ml infusion 50 mcg/hr (08/30/19 1905)    norepinephrine Stopped (08/31/19 0418)    dextrose        lactated ringers bolus  1,000 mL Intravenous Once    insulin lispro  0-12 Units Subcutaneous Q4H    pantoprazole  40 mg Intravenous Daily    And    sodium chloride (PF)  10 mL Intravenous Daily    hydrocortisone sodium succinate PF  100 mg Intravenous Q8H    piperacillin-tazobactam  3.375 g Intravenous Q8H    fluconazole  400 mg Intravenous Q24H    latanoprost  1 drop Both Eyes Nightly    [Held by provider] verapamil  80 mg Per G Tube 3 times per day    chlorhexidine  15 mL Mouth/Throat BID    sodium chloride flush  10 mL Intravenous 2 times per day        Current Meds:  Current Facility-Administered Medications   Medication Dose Route Frequency Provider Last Rate Last Dose    lactated ringers infusion   Intravenous Continuous Rhoderick Afua Warren  mL/hr at 08/31/19 0533      propofol injection  10 mcg/kg/min Intravenous Titrated Minerva Shea MD 3.5 mL/hr at 08/30/19 1659 10 mcg/kg/min at 08/30/19 1659    fentaNYL 5 mcg/ml in 0.9%  ml infusion  25 mcg/hr Intravenous Continuous Minerva Shea MD 10 mL/hr at 08/30/19 1905 50 mcg/hr at 08/30/19 1905    norepinephrine (LEVOPHED) 16 mg in dextrose 5% 250 mL infusion  2 mcg/min Intravenous Continuous Don Kothari MD   Stopped at 08/31/19 0418    lactated ringers bolus  1,000 mL Intravenous Once Margrette Dry., DO        insulin lispro (HUMALOG) injection vial 0-12 Units  0-12 Units Subcutaneous Q4H Nichelle Edwards MD   2 Units at 08/30/19 0012    pantoprazole (PROTONIX) injection 40 mg  40 mg Intravenous Daily Minerva Shea MD   40 mg at 08/29/19 1800    And    sodium chloride (PF) 0.9 % injection 10 mL  10 mL Intravenous Daily Minerva Shea MD   10 mL at 08/29/19 1800    Regular rhythm. No murmur or rub. .   Abd: Non-tender. Non-distended. No masses. No organmegaly. Decreased bowel sounds; wound vac applied  Skin: Warm and dry. No nodule on exposed extremities. No rash on exposed extremities. Ext: No cyanosis, clubbing, edema ; stables to L hip  Neuro: Awake. Follows commands. Positive pupils/gag/corneals. Normal pain response. Results:   CBC:   Recent Labs     08/30/19 1130 08/30/19  1800 08/31/19  0001   WBC 13.2* 12.4* 14.3*   HGB 6.9* 4.9* 6.4*   *  103* 86* 76*      BMP:    Recent Labs     08/30/19 1130 08/30/19  1800 08/31/19  0001    138 138   K 4.2 4.0 4.5    101 102   CO2 23 24 23   BUN 30* 33* 34*   CREATININE 2.0* 2.3* 2.5*   GLUCOSE 149* 141* 144*       Hepatic:   Recent Labs     08/28/19 1930 08/29/19  0620 08/30/19  0550   AST 17 26 741*  721*   ALT 8 11 260*  264*   BILITOT 0.3 0.7 0.5  0.5   ALKPHOS 84 71 52  53      Troponin: No results for input(s): TROPONINI in the last 72 hours. BNP: No results for input(s): BNP in the last 72 hours. Lipids: No results for input(s): CHOL, HDL in the last 72 hours. Invalid input(s): LDLCALCU   ABGs:   Lab Results   Component Value Date    PO2ART 350.2 08/28/2019    CXL4YYT 42.7 08/28/2019      INR:   Recent Labs     08/28/19 1930 08/29/19  0620 08/30/19  0550   INR 1.6 1.6 1.8         Assessment/Plans      1. Acute kidney injury stage 1 in setting of septic shock most likely ischemic ATN, decreasing UO; however trransiently improved in response to IV fluid bolus; now oliguric          -in view of findings  On 2nd look lap and overall poor prognosis           no immediate plans for dialysis ; conservative care  2. Septic shock with peritonitis from ischemic bowel as source   -decrease IVF, fluid balance ++; mark give 1 dose of bumex  3.  Acute abdomen due to pneumatosis intestinalis with enterocolitis and ischemic bowel   -2nd look lap showed ischemic bowel ; transverse colon and SB

## 2019-08-31 NOTE — FLOWSHEET NOTE
Intubated/sedated/anxious at times with stimulation. Reaching for ett when restraints removed for repositioning. Unable to consistently follow verbal direction.

## 2019-08-31 NOTE — PROGRESS NOTES
07/09/2019 Growth not present  Final     MRSA Culture Only   Date Value Ref Range Status   08/28/2019 Methicillin resistant Staph aureus not isolated  Final       ASSESSMENT:  · Septic shock from necrotic/ischemic BOWEL  · Further ischemia resulting in small bowel and transverse colon resection    PLAN:  · Continue Zosyn and Diflucan for now  · Discussed with surgery-plans noted  · Check final cultures  · Monitor labs    Milagros Hernandez  5:27 PM  8/31/2019

## 2019-08-31 NOTE — PROGRESS NOTES
colon and resection of small bowel; [facility] in place; 800 cc serosenguinis output  · Extremities:  extremities normal, atraumatic, no cyanosis or edema  · Musculokeletal: No joint swelling, no muscle tenderness. ROM normal in all joints of extremities. · Neurologic: Intubated and sedated  Subject  Pertinent Labs & Imaging Studies   rohit  CBC with Differential:    Lab Results   Component Value Date    WBC 17.9 08/31/2019    RBC 2.83 08/31/2019    HGB 8.4 08/31/2019    HCT 23.6 08/31/2019    PLT 58 08/31/2019    MCV 83.4 08/31/2019    MCH 29.7 08/31/2019    MCHC 35.6 08/31/2019    RDW 14.1 08/31/2019    NRBC 1.7 08/30/2019    SEGSPCT 57 11/29/2011    LYMPHOPCT 4.3 08/31/2019    MONOPCT 6.2 08/31/2019    BASOPCT 0.1 08/31/2019    MONOSABS 1.12 08/31/2019    LYMPHSABS 0.77 08/31/2019    EOSABS 0.00 08/31/2019    BASOSABS 0.02 08/31/2019     BMP:    Lab Results   Component Value Date     08/31/2019    K 4.4 08/31/2019    K 3.8 08/28/2019     08/31/2019    CO2 26 08/31/2019    BUN 37 08/31/2019    LABALBU 2.0 08/31/2019    LABALBU 3.3 11/29/2011    CREATININE 2.7 08/31/2019    CALCIUM 7.6 08/31/2019    GFRAA 20 08/31/2019    LABGLOM 20 08/31/2019    GLUCOSE 146 08/31/2019    GLUCOSE 121 11/29/2011       Resident's Assessment and Plan     Romario Batista is a 81 y.o. female    1. Pneumatosis Intestinalis post op day 3 s/p R colon resection and small bowel resection; post op day 2 revascularization of SMA; post op day 1 s/p 2nd ex lap with resection of transverse colon and small bowel    Post op day 2 s/p 2nd ex lap for acute on chronic mesenteric ischemia (8/30/19)  Post op day 3 angioplasty w/ revascularization of SMA (8/29/19)  Post op day 4 s/p laparotomy w/ resection of R colon and small bowel (8/28/19)    - Underwent revascularization of SMA  for acute on chronic mesenteric ischemia; patient did not improve and underwent 2nd ex lap this AM w/ resection of transverse colon and small bowel.  Patient then led to ischemic colitis/infection  -intestinal pneumoniosis? Underwent revascularization of SMA  for acute on chronic mesenteric ischemia; patient did not improve and underwent 2nd ex lap this AM w/ resection of transverse colon and small bowel    Areas necrotic bowel may have extended  MODS, renal failure, respiratory failure-- multifactorial  dm2-- not getting fed gut not working  d5 vs continued lactated ringers,  TPN at some point (day 5-7)\    8/29 perioperatively -insult  Shock liver likely --now better. - shock kidney - ATN hypoxic injury, kidney NOT returning  >50% of time spent coordinating care with other providers and/or counseling patient/family  Remainder of medical problems as per resident note.'

## 2019-09-01 LAB
BLOOD BANK DISPENSE STATUS: NORMAL
BLOOD BANK PRODUCT CODE: NORMAL
BPU ID: NORMAL
DESCRIPTION BLOOD BANK: NORMAL
METER GLUCOSE: 115 MG/DL (ref 74–99)
METER GLUCOSE: 143 MG/DL (ref 74–99)
METER GLUCOSE: 74 MG/DL (ref 74–99)
METER GLUCOSE: 88 MG/DL (ref 74–99)
METER GLUCOSE: 94 MG/DL (ref 74–99)

## 2019-09-01 PROCEDURE — 99232 SBSQ HOSP IP/OBS MODERATE 35: CPT | Performed by: INTERNAL MEDICINE

## 2019-09-01 PROCEDURE — 2580000003 HC RX 258: Performed by: INTERNAL MEDICINE

## 2019-09-01 PROCEDURE — 82962 GLUCOSE BLOOD TEST: CPT

## 2019-09-01 PROCEDURE — 99232 SBSQ HOSP IP/OBS MODERATE 35: CPT | Performed by: SURGERY

## 2019-09-01 PROCEDURE — 6360000002 HC RX W HCPCS: Performed by: STUDENT IN AN ORGANIZED HEALTH CARE EDUCATION/TRAINING PROGRAM

## 2019-09-01 PROCEDURE — 2580000003 HC RX 258: Performed by: STUDENT IN AN ORGANIZED HEALTH CARE EDUCATION/TRAINING PROGRAM

## 2019-09-01 PROCEDURE — 2000000000 HC ICU R&B

## 2019-09-01 PROCEDURE — 2500000003 HC RX 250 WO HCPCS: Performed by: INTERNAL MEDICINE

## 2019-09-01 PROCEDURE — 6370000000 HC RX 637 (ALT 250 FOR IP): Performed by: STUDENT IN AN ORGANIZED HEALTH CARE EDUCATION/TRAINING PROGRAM

## 2019-09-01 RX ORDER — BUMETANIDE 0.25 MG/ML
2 INJECTION, SOLUTION INTRAMUSCULAR; INTRAVENOUS ONCE
Status: COMPLETED | OUTPATIENT
Start: 2019-09-01 | End: 2019-09-01

## 2019-09-01 RX ORDER — DEXTROSE, SODIUM CHLORIDE, SODIUM LACTATE, POTASSIUM CHLORIDE, AND CALCIUM CHLORIDE 5; .6; .31; .03; .02 G/100ML; G/100ML; G/100ML; G/100ML; G/100ML
INJECTION, SOLUTION INTRAVENOUS CONTINUOUS
Status: DISCONTINUED | OUTPATIENT
Start: 2019-09-01 | End: 2019-09-03

## 2019-09-01 RX ADMIN — LATANOPROST 1 DROP: 50 SOLUTION OPHTHALMIC at 23:09

## 2019-09-01 RX ADMIN — HYDROCORTISONE SODIUM SUCCINATE 100 MG: 100 INJECTION, POWDER, FOR SOLUTION INTRAMUSCULAR; INTRAVENOUS at 23:08

## 2019-09-01 RX ADMIN — Medication 10 ML: at 23:09

## 2019-09-01 RX ADMIN — Medication 10 ML: at 08:09

## 2019-09-01 RX ADMIN — BUMETANIDE 2 MG: 0.25 INJECTION INTRAMUSCULAR; INTRAVENOUS at 08:06

## 2019-09-01 RX ADMIN — SODIUM CHLORIDE, SODIUM LACTATE, POTASSIUM CHLORIDE, CALCIUM CHLORIDE AND DEXTROSE MONOHYDRATE: 5; 600; 310; 30; 20 INJECTION, SOLUTION INTRAVENOUS at 11:08

## 2019-09-01 RX ADMIN — HYDROCORTISONE SODIUM SUCCINATE 100 MG: 100 INJECTION, POWDER, FOR SOLUTION INTRAMUSCULAR; INTRAVENOUS at 05:26

## 2019-09-01 RX ADMIN — SODIUM CHLORIDE, POTASSIUM CHLORIDE, SODIUM LACTATE AND CALCIUM CHLORIDE: 600; 310; 30; 20 INJECTION, SOLUTION INTRAVENOUS at 05:57

## 2019-09-01 RX ADMIN — Medication 10 ML: at 08:10

## 2019-09-01 RX ADMIN — INSULIN LISPRO 2 UNITS: 100 INJECTION, SOLUTION INTRAVENOUS; SUBCUTANEOUS at 23:08

## 2019-09-01 ASSESSMENT — PAIN SCALES - GENERAL
PAINLEVEL_OUTOF10: 0
PAINLEVEL_OUTOF10: 2
PAINLEVEL_OUTOF10: 0
PAINLEVEL_OUTOF10: 0

## 2019-09-01 NOTE — PROGRESS NOTES
WBC 14.3 (H) 08/31/2019    WBC 12.4 (H) 08/30/2019    HGB 12.0 08/31/2019    HCT 34.0 08/31/2019    MCV 83.4 08/31/2019    PLT 58 (L) 08/31/2019     Lab Results   Component Value Date    NEUTROABS 15.79 (H) 08/31/2019    NEUTROABS 12.37 (H) 08/30/2019    NEUTROABS 14.74 (H) 08/29/2019     No results found for: CRPHS  Lab Results   Component Value Date     (H) 08/31/2019     (H) 08/31/2019    ALKPHOS 40 08/31/2019    BILITOT 0.8 08/31/2019     Lab Results   Component Value Date     08/31/2019    K 4.4 08/31/2019    K 3.8 08/28/2019     08/31/2019    CO2 26 08/31/2019    BUN 37 08/31/2019    CREATININE 2.7 08/31/2019    CREATININE 2.5 08/31/2019    CREATININE 2.3 08/30/2019    GFRAA 20 08/31/2019    LABGLOM 20 08/31/2019    GLUCOSE 146 08/31/2019    GLUCOSE 121 11/29/2011    PROT 3.5 08/31/2019    LABALBU 2.0 08/31/2019    LABALBU 3.3 11/29/2011    CALCIUM 7.6 08/31/2019    BILITOT 0.8 08/31/2019    ALKPHOS 40 08/31/2019     08/31/2019     08/31/2019     Lab Results   Component Value Date    CRP 21.3 (H) 08/28/2019     Lab Results   Component Value Date    SEDRATE 73 (H) 08/28/2019    SEDRATE 56 (H) 09/25/2017     Radiology:      Microbiology:   Lab Results   Component Value Date    BC 24 Hours- no growth 08/27/2019    BC 5 Days- no growth 07/08/2019     Lab Results   Component Value Date    BLOODCULT2 24 Hours- no growth 08/27/2019    BLOODCULT2 5 Days- no growth 07/08/2019     No results found for: WNDABS  No results found for: RESPSMEAR  No results found for: MPNEUMO, CLAMYDCU, LABLEGI, AFBCX, FUNGSM, LABFUNG  No results found for: CULTRESP  No results found for: CXCATHTIP  No results found for: BFCS  No results found for: CXSURG  Urine Culture, Routine   Date Value Ref Range Status   07/09/2019 Growth not present  Final     MRSA Culture Only   Date Value Ref Range Status   08/28/2019 Methicillin resistant Staph aureus not isolated  Final       ASSESSMENT:  · Septic shock

## 2019-09-01 NOTE — PLAN OF CARE
Problem: Falls - Risk of:  Goal: Will remain free from falls  Description  Will remain free from falls  9/1/2019 0216 by Fercho Nunez RN  Outcome: Met This Shift     Problem: Falls - Risk of:  Goal: Absence of physical injury  Description  Absence of physical injury  9/1/2019 0216 by Fercho Nunez RN  Outcome: Met This Shift     Problem: Pain:  Goal: Control of acute pain  Description  Control of acute pain  9/1/2019 0216 by Fercho Nunez RN  Outcome: Met This Shift     Problem: Skin Integrity:  Goal: Absence of new skin breakdown  Description  Absence of new skin breakdown  Outcome: Met This Shift

## 2019-09-01 NOTE — PROGRESS NOTES
[facility]  Internal Medicine Residency Program  Progress Note - House Team 1    Patient:  Bisi Montesinos 81 y.o. female   MRN: [id number]       Date of Service: 2019    Allergy: Patient has no known allergies. Subjective     Patient was seen and examined in AM. Endorses sore throat. Still endorses abdominal pain. Denies fever, CP, SOB, chills, and N/V.     24 hour change: extubated yesterday. Received 2 U pRBCs. Off pressors now. Objective     TEMPERATURE:  Current - Temp: 99.1 °F (37.3 °C); Max - Temp  Av.2 °F (36.2 °C)  Min: 96 °F (35.6 °C)  Max: 99.1 °F (37.3 °C)  RESPIRATIONS RANGE: Resp  Avg: 15.2  Min: 7  Max: 26  PULSE RANGE: Pulse  Av.6  Min: 44  Max: 91  BLOOD PRESSURE RANGE:  Systolic (03JAP), PUI:863 , Min:107 , EWO:820   ; Diastolic (94NCQ), ILL:20, Min:56, Max:130    PULSE OXIMETRY RANGE: SpO2  Av.7 %  Min: 87 %  Max: 100 %    I & O - 24hr:    Intake/Output Summary (Last 24 hours) at 2019 0723  Last data filed at 2019 0700  Gross per 24 hour   Intake 2912 ml   Output 2168 ml   Net 744 ml     I/O last 3 completed shifts: In: 2912 [I.V.:2312; Blood:350; IV Piggyback:250]  Out: 2168 [Urine:618; Emesis/NG output:1000; Drains:550] No intake/output data recorded. Weight change:     Physical Exam   Constitutional: She appears well-developed and well-nourished. No distress. HENT:   Head: Normocephalic and atraumatic. Eyes: Pupils are equal, round, and reactive to light. EOM are normal.   Neck: Normal range of motion. Neck supple. No tracheal deviation present. Cardiovascular: Normal rate, regular rhythm and intact distal pulses. No murmur heard. Pulmonary/Chest: Effort normal and breath sounds normal. No respiratory distress. She has no rales. Abdominal:   S/p ex-lap, R colon/ small bowel resection, angioplasty with revascularization SMA; wound vac in place   Musculoskeletal: Normal range of motion. She exhibits no edema.    Skin: Skin following options; 1 . Automated exposure control, 2. Adjustment of MA and or KV according to patient's size or 3. Use of iterative reconstruction. INDICATION:  abd pain  COMPARISON: 2019 FINDINGS: The examination is limited due to the lack of contrast. The lung bases demonstrate scarring and mild  COPD. Small hiatal hernia is present. Liver is decreased in attenuation likely fatty infiltrated. Branching pattern of air is identified in the peripheral liver concerning for portal venous air. There is previous cholecystectomy. Spleen, pancreas, the adrenals and the kidneys are normal. There is significant calcification of aorta and iliac arteries. Severe compression deformity of L2 with the retropulsion of bony fragments with  compromise of spinal canal is noted without significant change. Pelvis. Bladder is distended. There is inflammation and thickening of the cecum and ascending colon with a fluid-filled dilated bowel loops in the right lower quadrant. There is air collection and air bubbles in the periphery of the bowel loops in the right lower quadrant concerning for pneumatosis of bowel loops/colon. Inflammatory changes with wall thickening and edema in the cecum and ascending colon as well as a dilated bowel loop in the right lower quadrant with the suggestion of pneumatosis. There is also suggestion of portal venous air concerning for severe inflammation. Consideration should be given to severe enterocolitis in the right lower quadrant. Examination is otherwise limited due to lack of contrast. ALERT:  THIS IS AN ABNORMAL REPORT     Xr Chest Portable    Result Date: 2019  Patient MRN: [id number] : 1935 Age:  81 years Gender: Female Order Date: 2019 6:00 AM Exam: XR CHEST PORTABLE Number of Images: 1 view Indication:   intubated intubated acute respiratory failure Comparison: 2019 Findings: An endotracheal tube, NG tube and central venous catheters are noted.  The heart is unremarkable. The lung fields demonstrate evidence for airspace disease. The aorta is tortuous and ectatic.     tortuous ectatic aorta Airspace disease compatible with atelectasis or pneumonia There are patchy infiltrates seen throughout both the lung fields. Underlying edema could also have this appearance The chest does not appear to be significantly changed in the interval     Xr Chest Portable    Result Date: 2019  Patient MRN:  [id number] : 1935 Age: 81 years Gender: Female Order Date:  2019 6:00 AM EXAM: XR CHEST PORTABLE one image INDICATION:  intubated intubated COMPARISON: 2019 FINDINGS: There is borderline cardiac size. Endotracheal tube, nasogastric tube, and right IJ central line are unchanged. There is vascular congestion with the persistent slightly progressive atelectasis and pleural effusions in the lung bases. There is severe deformity of the left shoulder. Progressive CHF. Xr Chest Portable    Result Date: 2019  Patient MRN:  [id number] : 1935 Age: 81 years Gender: Female Order Date:  2019 6:00 AM EXAM: XR CHEST PORTABLE one image INDICATION:  intubated intubated COMPARISON: 2019 FINDINGS: The heart and the mediastinal structures are normal. Endotracheal tube, right IJ central line, and a nasogastric tube are unchanged. There is no focal consolidation or effusion. There is severe deformity of the left shoulder. There is underlying COPD. Stable nonacute chest.     Xr Chest Portable    Result Date: 2019  Patient MRN:  [id number] : 1935 Age: 81 years Gender: Female Order Date:  2019 9:00 PM TECHNIQUE/NUMBER OF IMAGES/COMPARISON/CLINICAL HISTORY: Chest AP 1 image and view Comparison was . Clinical history Central venous line placement. FINDINGS: Right internal jugular central venous catheter tip in the SVC. No pneumothorax on the right on the left.  Endotracheal tube in the level of T4 being just proximal to the upper Assessment and Plan      Cierra Morales is 81 y.o. female     Pneumatosis Intestinalis   S/p laparotomy with R colon/ small bowel resection 8/28  S/p angioplasty with revascularization of SMA 8/29  S/p ex-lap for acute on chronic mesenteric ischemia 8/30  - worsening abdominal pain 2-3 days before admission  - CT in ED showing poss pneumatosis intestinalis  - s/p bowel resection and attempted revascularization of SMA: wound vac in place  - Post- op Hgb decreased, received 8 units total pRBCs since 8/29- Hgb now 12.0  - worsening kidney function: Cr 2.7 with baseline appearing <1.0, urine output 0.4 cc/kg/hr; nephrology following appreciate recs  - continue zosyn and diflucan per ID  - palliative following, appreciate recs: patient now DNR- CCA  - continue with SICU management    HTN  - off pressors now   - home verapamil held  - continue to monitor BP, /95 this AM    Type 2 DM  - sugars running  <140 even in presence of steroids  - med dose SSI    Prolonged QTc  - QTc 497 on admission  - hold drugs that could further prolong      GI/DVT:   Dispo: SICU    Martin Maza MD PGY-1   Family Medicine resident  Attending Physician: Dr. Kym Baum     Attending Physician Statement:  Sahra Hameed M.D., F.A.C.P.     I have discussed the case, including pertinent history and exam findings with the resident/NP. I have seen and examined the patient and the key elements of the encounter have been performed by me. I agree with the resident ROS, PMHx, PSHx, meds reviewed and assessment, plan and orders as documented by the resident/NP    [facility] charts reviewed, including other providers notes, relevant labs and imaging. Hx of abd pain/n/v- dec PO intake  inflammation and thickening of   the cecum and ascending colon with a fluid-filled dilated bowel loops   in the right lower quadrant.  There is air collection and air bubbles   in the periphery of the bowel

## 2019-09-02 LAB
BLOOD CULTURE, ROUTINE: NORMAL
CULTURE, BLOOD 2: NORMAL
METER GLUCOSE: 100 MG/DL (ref 74–99)
METER GLUCOSE: 132 MG/DL (ref 74–99)
METER GLUCOSE: 147 MG/DL (ref 74–99)
METER GLUCOSE: 170 MG/DL (ref 74–99)

## 2019-09-02 PROCEDURE — 2580000003 HC RX 258: Performed by: INTERNAL MEDICINE

## 2019-09-02 PROCEDURE — 6370000000 HC RX 637 (ALT 250 FOR IP): Performed by: STUDENT IN AN ORGANIZED HEALTH CARE EDUCATION/TRAINING PROGRAM

## 2019-09-02 PROCEDURE — 2580000003 HC RX 258: Performed by: STUDENT IN AN ORGANIZED HEALTH CARE EDUCATION/TRAINING PROGRAM

## 2019-09-02 PROCEDURE — 2000000000 HC ICU R&B

## 2019-09-02 PROCEDURE — 6360000002 HC RX W HCPCS: Performed by: STUDENT IN AN ORGANIZED HEALTH CARE EDUCATION/TRAINING PROGRAM

## 2019-09-02 PROCEDURE — 6360000002 HC RX W HCPCS: Performed by: SURGERY

## 2019-09-02 PROCEDURE — 99232 SBSQ HOSP IP/OBS MODERATE 35: CPT | Performed by: SURGERY

## 2019-09-02 PROCEDURE — 6360000002 HC RX W HCPCS

## 2019-09-02 PROCEDURE — 82962 GLUCOSE BLOOD TEST: CPT

## 2019-09-02 RX ORDER — HYDRALAZINE HYDROCHLORIDE 20 MG/ML
INJECTION INTRAMUSCULAR; INTRAVENOUS
Status: COMPLETED
Start: 2019-09-02 | End: 2019-09-02

## 2019-09-02 RX ORDER — LABETALOL HYDROCHLORIDE 5 MG/ML
10 INJECTION, SOLUTION INTRAVENOUS EVERY 30 MIN PRN
Status: DISCONTINUED | OUTPATIENT
Start: 2019-09-02 | End: 2019-09-03

## 2019-09-02 RX ORDER — MORPHINE SULFATE 2 MG/ML
2 INJECTION, SOLUTION INTRAMUSCULAR; INTRAVENOUS
Status: DISCONTINUED | OUTPATIENT
Start: 2019-09-02 | End: 2019-09-06 | Stop reason: HOSPADM

## 2019-09-02 RX ORDER — HYDRALAZINE HYDROCHLORIDE 20 MG/ML
10 INJECTION INTRAMUSCULAR; INTRAVENOUS EVERY 30 MIN PRN
Status: DISCONTINUED | OUTPATIENT
Start: 2019-09-02 | End: 2019-09-03

## 2019-09-02 RX ADMIN — HYDROCORTISONE SODIUM SUCCINATE 100 MG: 100 INJECTION, POWDER, FOR SOLUTION INTRAMUSCULAR; INTRAVENOUS at 04:52

## 2019-09-02 RX ADMIN — HYDRALAZINE HYDROCHLORIDE 10 MG: 20 INJECTION INTRAMUSCULAR; INTRAVENOUS at 00:33

## 2019-09-02 RX ADMIN — MORPHINE SULFATE 2 MG: 2 INJECTION, SOLUTION INTRAMUSCULAR; INTRAVENOUS at 16:55

## 2019-09-02 RX ADMIN — HYDROCORTISONE SODIUM SUCCINATE 100 MG: 100 INJECTION, POWDER, FOR SOLUTION INTRAMUSCULAR; INTRAVENOUS at 21:26

## 2019-09-02 RX ADMIN — HYDRALAZINE HYDROCHLORIDE 10 MG: 20 INJECTION INTRAMUSCULAR; INTRAVENOUS at 04:52

## 2019-09-02 RX ADMIN — INSULIN LISPRO 2 UNITS: 100 INJECTION, SOLUTION INTRAVENOUS; SUBCUTANEOUS at 21:34

## 2019-09-02 RX ADMIN — INSULIN LISPRO 2 UNITS: 100 INJECTION, SOLUTION INTRAVENOUS; SUBCUTANEOUS at 05:05

## 2019-09-02 RX ADMIN — Medication 10 ML: at 21:26

## 2019-09-02 RX ADMIN — SODIUM CHLORIDE, SODIUM LACTATE, POTASSIUM CHLORIDE, CALCIUM CHLORIDE AND DEXTROSE MONOHYDRATE: 5; 600; 310; 30; 20 INJECTION, SOLUTION INTRAVENOUS at 04:53

## 2019-09-02 RX ADMIN — HYDROCORTISONE SODIUM SUCCINATE 100 MG: 100 INJECTION, POWDER, FOR SOLUTION INTRAMUSCULAR; INTRAVENOUS at 13:00

## 2019-09-02 RX ADMIN — SODIUM CHLORIDE, SODIUM LACTATE, POTASSIUM CHLORIDE, CALCIUM CHLORIDE AND DEXTROSE MONOHYDRATE: 5; 600; 310; 30; 20 INJECTION, SOLUTION INTRAVENOUS at 18:15

## 2019-09-02 RX ADMIN — MORPHINE SULFATE 2 MG: 2 INJECTION, SOLUTION INTRAMUSCULAR; INTRAVENOUS at 21:43

## 2019-09-02 RX ADMIN — Medication 10 ML: at 10:11

## 2019-09-02 ASSESSMENT — PAIN DESCRIPTION - PAIN TYPE
TYPE: ACUTE PAIN
TYPE: ACUTE PAIN
TYPE: ACUTE PAIN;SURGICAL PAIN

## 2019-09-02 ASSESSMENT — PAIN SCALES - GENERAL
PAINLEVEL_OUTOF10: 0
PAINLEVEL_OUTOF10: 0
PAINLEVEL_OUTOF10: 10
PAINLEVEL_OUTOF10: 0
PAINLEVEL_OUTOF10: 5
PAINLEVEL_OUTOF10: 10
PAINLEVEL_OUTOF10: 9
PAINLEVEL_OUTOF10: 0

## 2019-09-02 ASSESSMENT — PAIN DESCRIPTION - LOCATION
LOCATION: ABDOMEN

## 2019-09-02 ASSESSMENT — PAIN DESCRIPTION - ORIENTATION: ORIENTATION: MID

## 2019-09-02 ASSESSMENT — PAIN DESCRIPTION - FREQUENCY
FREQUENCY: CONTINUOUS
FREQUENCY: CONTINUOUS

## 2019-09-02 ASSESSMENT — PAIN DESCRIPTION - DESCRIPTORS
DESCRIPTORS: ACHING;CONSTANT;OTHER (COMMENT)
DESCRIPTORS: ACHING;DISCOMFORT

## 2019-09-02 ASSESSMENT — PAIN DESCRIPTION - ONSET
ONSET: ON-GOING
ONSET: ON-GOING

## 2019-09-02 NOTE — PROGRESS NOTES
[facility]  Internal Medicine Residency Program  Progress Note - House Team 1    Patient:  Maria Alejandra Garnett 81 y.o. female   MRN: [id number]       Date of Service: 2019    Allergy: Patient has no known allergies. Subjective     Patient was seen and examined in AM. Reports feeling very hungry. Reports bloated feeling as though she needs to pass flatus. Denies fever, CP, SOB, chills, and N/V.     24 hour change: goals of care discussion yesterday with family and Dr. David Parks, family is now opting for hospice and pt is now [facility]. Objective     TEMPERATURE:  Current - Temp: 98.4 °F (36.9 °C); Max - Temp  Av.1 °F (36.7 °C)  Min: 97.6 °F (36.4 °C)  Max: 98.4 °F (36.9 °C)  RESPIRATIONS RANGE: Resp  Av  Min: 14  Max: 22  PULSE RANGE: Pulse  Av.8  Min: 71  Max: 89  BLOOD PRESSURE RANGE:  Systolic (89RUQ), TLI:753 , Min:119 , CPE:740   ; Diastolic (75JTI), CRI:87, Min:69, Max:115    PULSE OXIMETRY RANGE: SpO2  Av.7 %  Min: 95 %  Max: 98 %    I & O - 24hr:    Intake/Output Summary (Last 24 hours) at 2019 0727  Last data filed at 2019 0600  Gross per 24 hour   Intake 1795 ml   Output 2610 ml   Net -815 ml     I/O last 3 completed shifts: In: 9413 [I.V.:1795]  Out: 8562 [Urine:1310; Emesis/NG output:900; Drains:400] No intake/output data recorded. Weight change:     Physical Exam   Constitutional: She appears well-developed and well-nourished. No distress. HENT:   Head: Normocephalic and atraumatic. Eyes: Pupils are equal, round, and reactive to light. Conjunctivae and EOM are normal.   Neck: Normal range of motion. Neck supple. Cardiovascular: Normal rate, regular rhythm, normal heart sounds and intact distal pulses. No murmur heard. Pulmonary/Chest: Effort normal and breath sounds normal.   Abdominal: Soft. There is tenderness. Wound vac in place   Musculoskeletal: Normal range of motion. She exhibits no edema or deformity.    Neurological: She is Macario Shrestha is 81 y.o. female       Pneumatosis Intestinalis   S/p laparotomy with R colon/ small bowel resection 8/28  S/p angioplasty with revascularization of SMA 8/29  S/p ex-lap for acute on chronic mesenteric ischemia 8/30  - worsening abdominal pain 2-3 days before admission  - CT in ED showing poss pneumatosis intestinalis  - s/p bowel resection and attempted revascularization of SMA: wound vac in place  - Post- op Hgb decreased, received 8 units total pRBCs since 8/29- Hgb now 12.0  - worsening kidney function: Cr 2.7 with baseline appearing <1.0, urine output 0.4 cc/kg/hr; nephrology following appreciate recs  - continue zosyn and diflucan per ID  - goals of care discussion yesterday, family now wanting hospice to evaluate pt  - pt is now [facility]  - continue with SICU management and await hospice eval     HTN  - off pressors now   - home verapamil held  - continue to monitor BP, /69 this AM     Type 2 DM  - sugars running  <170 even in presence of steroids  - med dose SSI     Prolonged QTc  - QTc 497 on admission  - hold drugs that could further prolong      DVT/GI: -  Dispo: SICU, hospice    Lesly Todd MD PGY-1   Family Medicine resident  Attending Physician: Dr. Mitchell Martinez     Attending Physician Statement:  Maria Del Carmen Montaño M.D., F.A.C.P.     I have discussed the case, including pertinent history and exam findings with the resident/NP. I have seen and examined the patient and the key elements of the encounter have been performed by me.  I agree with the resident ROS, PMHx, PSHx, meds reviewed and assessment, plan and orders as documented by the resident/NP [facility] [place] [facility] charts reviewed, including other providers notes, relevant labs and imaging.   Hx of abd pain/n/v- dec PO intake  inflammation and thickening of   the cecum and ascending colon with a fluid-filled dilated bowel loops   in the right lower quadrant.  There is air

## 2019-09-02 NOTE — PLAN OF CARE
Problem: Falls - Risk of:  Goal: Will remain free from falls  Description  Will remain free from falls  Outcome: Met This Shift     Problem: Falls - Risk of:  Goal: Absence of physical injury  Description  Absence of physical injury  9/1/2019 2042 by Purvi Garcia RN  Outcome: Met This Shift     Problem: Pain:  Goal: Control of acute pain  Description  Control of acute pain  Outcome: Met This Shift     Problem: Risk for Impaired Skin Integrity  Goal: Tissue integrity - skin and mucous membranes  Description  Structural intactness and normal physiological function of skin and  mucous membranes.   Outcome: Met This Shift     Problem: Skin Integrity:  Goal: Absence of new skin breakdown  Description  Absence of new skin breakdown  Outcome: Met This Shift     Problem: Role Relationship:  Goal: The number of positive interactions the caregiver has with the patient will increase  Description  The number of positive interactions the caregiver has with the patient will increase  Outcome: Met This Shift     Problem: Coping:  Goal: Ability to participate in care decisions during the dying process will improve  Description  Ability to participate in care decisions during the dying process will improve  Outcome: Not Met This Shift     Problem: Health Behavior:  Goal: Identification of resources available to assist in meeting health care needs will improve  Description  Identification of resources available to assist in meeting health care needs will improve  Outcome: Not Met This Shift     Problem: Physical Regulation:  Goal: Complications related to the disease process, condition or treatment will be avoided or minimized  Description  Complications related to the disease process, condition or treatment will be avoided or minimized  Outcome: Not Met This Shift     Problem: Sensory:  Goal: Expressions of feelings of enhanced comfort will increase  Description  Expressions of feelings of enhanced comfort will

## 2019-09-02 NOTE — PROGRESS NOTES
Liaison Informational Visit Note    Referral received from [facility] ([facility])           Call back number        Patient Name: Zachary Kat   :  1935  MRN:  [id number]    Admit date:  2019    Admitted from: [facility] Admitting Physician:  Po Ribera MD   PCP:  Alessio Guan MD      Primary Insurance: Payor: [facility] /  /  /    Secondary Insurance:      Emergency Contact:      Contact/Relation:   /         Phone:       Contact/Relation:   /     Phone:     Advance Directive  Advance directives received No  Patient has NOT completed an advance directive   Discussed with: Patient  DPOA-HC Name-Relation:    Phone:       Terminal Diagnosis multi system organ failure as confirmed by Dr. Lilly Shrestha Problem List:   Patient Active Problem List   Diagnosis Code    HTN (hypertension) I10    Osteoporosis M81.0    Chronic obstructive pulmonary disease ([address] J44.9    Glaucoma H40.9    Palliative care encounter Z51.5    Fracture of hip, R, comminuted S72.009A    PVD (peripheral vascular disease) ([address] I73.9    History of tobacco abuse Z87.891    Stage 3 chronic kidney disease ([address] N18.3    Closed compression fracture of lumbar vertebra ([address] S32.000A    Closed compression fracture of lumbar vertebra ([address] S32.000A    Iron deficiency anemia due to chronic blood loss D50.0    Closed compression fracture of lumbar vertebra ([address] S32.000A    Type 2 diabetes mellitus with hyperglycemia, without long-term current use of insulin ([address] E11.65    Osteoarthritis M19.90    Chest pain R07.9    Abdominal pain R10.9    Constipation K59.00    Anemia D64.9    Lactic acidosis E87.2    Osteopenia M85.80    Weight loss, unintentional R63.4    Closed left hip fracture, initial encounter ([address] S72.002A    Mitral valve insufficiency I34.0    Pneumatosis intestinalis K63.89    Enterocolitis K52.9    Leukocytosis D72.829    Acute respiratory failure ([address] J96.00  Ischemic necrosis of small bowel ([facility] K55.029       Code Status Order: DNR-CC     Past Medical History:        Diagnosis Date    Anxiety     Arthritis     COPD (chronic obstructive pulmonary disease) ([facility]     Diabetes mellitus ([facility]     Hypertension     Osteoarthritis      Past Surgical History:        Procedure Laterality Date    APPENDECTOMY      CHOLECYSTECTOMY      EYE SURGERY      HIP FRACTURE SURGERY Left 2019    HIP OPEN REDUCTION INTERNAL FIXATION performed by Heidy Kinsey MD at [facility] N/A 2019    DIAGNOSTIC LAPAROSCOPY CONVERTED TO OPEN LAPAROTOMY, LYSIS OF ADHESIONS FOR TWO HOURS, SMALL BOWEL RESECTION WITH RIGHT COLON performed by Kelsea Trejo MD at [id number] [place] [address] N/A 2019    2ND LOOK LAPAROTOMY performed by Kelsea Trejo MD at [address]     [id number]   : 1935   Age: 81 years   Gender: Female       Order Date:  2019 9:00 AM       EXAM: CT ABDOMEN PELVIS WO CONTRAST number of images 296       Technique: Low-dose CT  acquisition technique included one of   following options; 1 . Automated exposure control, 2. Adjustment of MA   and or KV according to patient's size or 3. Use of iterative   reconstruction.       INDICATION:  abd pain         COMPARISON: 2019       FINDINGS:   The examination is limited due to the lack of contrast. The lung bases   demonstrate scarring and mild  COPD. Small hiatal hernia is present. Liver is decreased in attenuation likely fatty infiltrated. Branching   pattern of air is identified in the peripheral liver concerning for   portal venous air. There is previous cholecystectomy. Spleen,   pancreas, the adrenals and the kidneys are normal. There is   significant calcification of aorta and iliac arteries. Severe   compression deformity of L2 with the retropulsion of bony fragments   with  compromise of spinal canal is noted without significant change.       Pelvis. Bladder is distended. There is inflammation and thickening of   the cecum and ascending colon with a fluid-filled dilated bowel loops   in the right lower quadrant. There is air collection and air bubbles   in the periphery of the bowel loops in the right lower quadrant   concerning for pneumatosis of bowel loops/colon.           Impression   Inflammatory changes with wall thickening and edema in the cecum and   ascending colon as well as a dilated bowel loop in the right lower   quadrant with the suggestion of pneumatosis. There is also suggestion   of portal venous air concerning for severe inflammation. Consideration   should be given to severe enterocolitis in the right lower quadrant. Examination is otherwise limited due to lack of contrast.         Allergies:  Patient has no known allergies. Family Goal: undecided    Meeting held with patient earlier today, she was confused to health situation. She knew she was in the hospital. She could not explain her situation to me. I met with her niece, Gracia Mcdaniel this afternoon. Medical record reviewed. Jamie Hannah came into the ED on 8/27/19 with complaint of increased abdominal pain for 2-3 days prior to admission. She has had a history of abdominal pain for the past six months. Marixa also complained of nausea and vomiting. Pain and N/V became worse after eating or drinking. She was trying to make herself vomit in hopes of feeling better. Her CT of the abdomen showed inflammatory changes along with dilated bowel loop in RLQ, suggesting pneumatosis intestinalis. She had an exploratory lap and small bowel resection of her right colon on 8/28/19. A wound vac was placed. She had a PCI with stent placed in her superior mesenteric artery on 8/29/19. She had a second abdominal surgery on 8/30/19. She had the wound vac removed, clot was evacuated and further resection of necrotic small bowel and resection of transverse colon necrosis. Wound vac was placed again.  She has a NGT to [facility], which has

## 2019-09-02 NOTE — CARE COORDINATION
SOCIAL WORK/CASEMANAGEMENT TRANSITION OF CARE PLANNING: rn called an family has questions about fianances. sw and cm to follow up on michael Almodovar Minors  9/2/2019

## 2019-09-02 NOTE — PROGRESS NOTES
[address] [place] [address] [facility]  Progress Note    SUBJECTIVE:  Chief Complaint   Patient presents with    Abdominal Pain     for the past 6 months, nothing has changed    Nausea     pt noted to be sticking her finger down her throat to make herself vomit     Patient is tolerating medications. No reported adverse drug reactions. EXTUBATED AND AWAKE AND ALERT   OFF PRESSORS   SMA was angioplastied stented  ABD NOT CLOSED  Review of systems:  As stated above in the chief complaint, otherwise negative. Medications:  Scheduled Meds:   lactated ringers bolus  1,000 mL Intravenous Once    insulin lispro  0-12 Units Subcutaneous Q4H    hydrocortisone sodium succinate PF  100 mg Intravenous Q8H    latanoprost  1 drop Both Eyes Nightly    sodium chloride flush  10 mL Intravenous 2 times per day     Continuous Infusions:   dextrose 5% in lactated ringers 75 mL/hr at 19 0453     PRN Meds:hydrALAZINE, labetalol, sodium chloride flush    OBJECTIVE:  /84   Pulse 84   Temp 98.4 °F (36.9 °C) (Oral)   Resp 18   Ht 5' 3\" (1.6 m)   Wt 132 lb 11.5 oz (60.2 kg)   SpO2 97%   BMI 23.51 kg/m²   Temp  Av.4 °F (36.9 °C)  Min: 98.2 °F (36.8 °C)  Max: 98.6 °F (37 °C)  Constitutional: The patient is sedated and intubated. Skin: Warm and dry. No rashes were noted. HEENT: Round and reactive pupils. Moist mucous membranes. No ulcerations or thrush. Neck: Supple to movements. Chest: No use of accessory muscles to breathe. Symmetrical expansion. No wheezing, crackles or rhonchi. Cardiovascular: S1 and S2 are rhythmic and regular. No murmurs appreciated. Abdomen: -bowel sounds to auscultation. Them is open postop. No masses felt. No hepatosplenomegaly. Extremities: No clubbing, no cyanosis, no edema.   Lines: peripheral              Art line 19              CBC triple-lumen catheter 2019  Laboratory and Tests Review:  Lab Results   Component Value Date    WBC 17.9 (H)

## 2019-09-02 NOTE — PROGRESS NOTES
Reviewed chart then went to patient's room. Ceferino Real is alert, not sure of time. She knows she is in the hospital. I introduced myself as nurse from [address] [place] [address]. Ceferino Real kept saying she \"wants to talk to the doctor that did this to her! \". I asked if she had any family that can be present with me when we discuss hospice care. Marixa then said, \" I don't have no kin, people think I have money. I have no money\". I talked to Ceferino Real about her living arrangements. She told me she lives in a [facility] apartment and people are always checking on her. I told Ceferino Real I was going to speak with her nurse and try and locate family. She became upset when I mentioned trying to find family. She talked about an uncle that passed awhile ago and her sisters that live out of state. I informed Daniel Arrieta would return. I spoke with bedside nurse. She told me the niece, Teodora Broderick is making decisions for her aunt. I called Hemant. She is planning on coming to the hospital today at 1400. I will meet with her then to discuss hospice care. Bedside nurse told me family has decided against any further surgery. I asked about discharge plans. Bedside nurse stated they are looking for room upstairs. I informed her I will be back at 1400 to meet with family.

## 2019-09-03 ENCOUNTER — ANESTHESIA (OUTPATIENT)
Dept: OPERATING ROOM | Age: 84
DRG: 853 | End: 2019-09-03
Payer: COMMERCIAL

## 2019-09-03 ENCOUNTER — ANESTHESIA EVENT (OUTPATIENT)
Dept: OPERATING ROOM | Age: 84
DRG: 853 | End: 2019-09-03
Payer: COMMERCIAL

## 2019-09-03 VITALS
SYSTOLIC BLOOD PRESSURE: 115 MMHG | RESPIRATION RATE: 3 BRPM | TEMPERATURE: 96.3 F | DIASTOLIC BLOOD PRESSURE: 67 MMHG | OXYGEN SATURATION: 89 %

## 2019-09-03 PROBLEM — E43 SEVERE PROTEIN-CALORIE MALNUTRITION (HCC): Chronic | Status: ACTIVE | Noted: 2019-09-03

## 2019-09-03 LAB
METER GLUCOSE: 127 MG/DL (ref 74–99)
METER GLUCOSE: 176 MG/DL (ref 74–99)

## 2019-09-03 PROCEDURE — 6360000002 HC RX W HCPCS: Performed by: STUDENT IN AN ORGANIZED HEALTH CARE EDUCATION/TRAINING PROGRAM

## 2019-09-03 PROCEDURE — 2000000000 HC ICU R&B

## 2019-09-03 PROCEDURE — 6360000002 HC RX W HCPCS: Performed by: NURSE ANESTHETIST, CERTIFIED REGISTERED

## 2019-09-03 PROCEDURE — 44310 ILEOSTOMY/JEJUNOSTOMY: CPT | Performed by: SURGERY

## 2019-09-03 PROCEDURE — 6370000000 HC RX 637 (ALT 250 FOR IP): Performed by: STUDENT IN AN ORGANIZED HEALTH CARE EDUCATION/TRAINING PROGRAM

## 2019-09-03 PROCEDURE — 6360000002 HC RX W HCPCS

## 2019-09-03 PROCEDURE — 99232 SBSQ HOSP IP/OBS MODERATE 35: CPT | Performed by: INTERNAL MEDICINE

## 2019-09-03 PROCEDURE — 0D1A0Z4 BYPASS JEJUNUM TO CUTANEOUS, OPEN APPROACH: ICD-10-PCS | Performed by: SURGERY

## 2019-09-03 PROCEDURE — 99231 SBSQ HOSP IP/OBS SF/LOW 25: CPT | Performed by: INTERNAL MEDICINE

## 2019-09-03 PROCEDURE — 2500000003 HC RX 250 WO HCPCS

## 2019-09-03 PROCEDURE — 3700000000 HC ANESTHESIA ATTENDED CARE: Performed by: SURGERY

## 2019-09-03 PROCEDURE — 6360000002 HC RX W HCPCS: Performed by: SURGERY

## 2019-09-03 PROCEDURE — 3600000004 HC SURGERY LEVEL 4 BASE: Performed by: SURGERY

## 2019-09-03 PROCEDURE — 3700000001 HC ADD 15 MINUTES (ANESTHESIA): Performed by: SURGERY

## 2019-09-03 PROCEDURE — 2580000003 HC RX 258: Performed by: STUDENT IN AN ORGANIZED HEALTH CARE EDUCATION/TRAINING PROGRAM

## 2019-09-03 PROCEDURE — 2500000003 HC RX 250 WO HCPCS: Performed by: STUDENT IN AN ORGANIZED HEALTH CARE EDUCATION/TRAINING PROGRAM

## 2019-09-03 PROCEDURE — 2580000003 HC RX 258: Performed by: INTERNAL MEDICINE

## 2019-09-03 PROCEDURE — 2500000003 HC RX 250 WO HCPCS: Performed by: NURSE ANESTHETIST, CERTIFIED REGISTERED

## 2019-09-03 PROCEDURE — 2580000003 HC RX 258

## 2019-09-03 PROCEDURE — 2709999900 HC NON-CHARGEABLE SUPPLY: Performed by: SURGERY

## 2019-09-03 PROCEDURE — 3600000014 HC SURGERY LEVEL 4 ADDTL 15MIN: Performed by: SURGERY

## 2019-09-03 PROCEDURE — 99232 SBSQ HOSP IP/OBS MODERATE 35: CPT | Performed by: SURGERY

## 2019-09-03 PROCEDURE — 82962 GLUCOSE BLOOD TEST: CPT

## 2019-09-03 PROCEDURE — 0WQF0ZZ REPAIR ABDOMINAL WALL, OPEN APPROACH: ICD-10-PCS | Performed by: SURGERY

## 2019-09-03 RX ORDER — HYDRALAZINE HYDROCHLORIDE 20 MG/ML
10 INJECTION INTRAMUSCULAR; INTRAVENOUS EVERY 30 MIN PRN
Status: DISCONTINUED | OUTPATIENT
Start: 2019-09-03 | End: 2019-09-04

## 2019-09-03 RX ORDER — DEXAMETHASONE SODIUM PHOSPHATE 10 MG/ML
INJECTION INTRAMUSCULAR; INTRAVENOUS PRN
Status: DISCONTINUED | OUTPATIENT
Start: 2019-09-03 | End: 2019-09-03 | Stop reason: SDUPTHER

## 2019-09-03 RX ORDER — PROPOFOL 10 MG/ML
INJECTION, EMULSION INTRAVENOUS PRN
Status: DISCONTINUED | OUTPATIENT
Start: 2019-09-03 | End: 2019-09-03 | Stop reason: SDUPTHER

## 2019-09-03 RX ORDER — LABETALOL HYDROCHLORIDE 5 MG/ML
5 INJECTION, SOLUTION INTRAVENOUS EVERY 10 MIN PRN
Status: DISCONTINUED | OUTPATIENT
Start: 2019-09-03 | End: 2019-09-03 | Stop reason: HOSPADM

## 2019-09-03 RX ORDER — GLYCOPYRROLATE 1 MG/5 ML
SYRINGE (ML) INTRAVENOUS PRN
Status: DISCONTINUED | OUTPATIENT
Start: 2019-09-03 | End: 2019-09-03 | Stop reason: SDUPTHER

## 2019-09-03 RX ORDER — CEFAZOLIN SODIUM 1 G/3ML
INJECTION, POWDER, FOR SOLUTION INTRAMUSCULAR; INTRAVENOUS PRN
Status: DISCONTINUED | OUTPATIENT
Start: 2019-09-03 | End: 2019-09-03 | Stop reason: SDUPTHER

## 2019-09-03 RX ORDER — NEOSTIGMINE METHYLSULFATE 1 MG/ML
INJECTION, SOLUTION INTRAVENOUS PRN
Status: DISCONTINUED | OUTPATIENT
Start: 2019-09-03 | End: 2019-09-03 | Stop reason: SDUPTHER

## 2019-09-03 RX ORDER — FENTANYL CITRATE 50 UG/ML
INJECTION, SOLUTION INTRAMUSCULAR; INTRAVENOUS PRN
Status: DISCONTINUED | OUTPATIENT
Start: 2019-09-03 | End: 2019-09-03 | Stop reason: SDUPTHER

## 2019-09-03 RX ORDER — PROMETHAZINE HYDROCHLORIDE 25 MG/ML
25 INJECTION, SOLUTION INTRAMUSCULAR; INTRAVENOUS PRN
Status: DISCONTINUED | OUTPATIENT
Start: 2019-09-03 | End: 2019-09-03 | Stop reason: HOSPADM

## 2019-09-03 RX ORDER — ONDANSETRON 2 MG/ML
INJECTION INTRAMUSCULAR; INTRAVENOUS PRN
Status: DISCONTINUED | OUTPATIENT
Start: 2019-09-03 | End: 2019-09-03 | Stop reason: SDUPTHER

## 2019-09-03 RX ORDER — ROCURONIUM BROMIDE 10 MG/ML
INJECTION, SOLUTION INTRAVENOUS PRN
Status: DISCONTINUED | OUTPATIENT
Start: 2019-09-03 | End: 2019-09-03 | Stop reason: SDUPTHER

## 2019-09-03 RX ORDER — MEPERIDINE HYDROCHLORIDE 50 MG/ML
12.5 INJECTION INTRAMUSCULAR; INTRAVENOUS; SUBCUTANEOUS EVERY 5 MIN PRN
Status: DISCONTINUED | OUTPATIENT
Start: 2019-09-03 | End: 2019-09-03 | Stop reason: HOSPADM

## 2019-09-03 RX ORDER — SODIUM CHLORIDE, SODIUM LACTATE, POTASSIUM CHLORIDE, CALCIUM CHLORIDE 600; 310; 30; 20 MG/100ML; MG/100ML; MG/100ML; MG/100ML
INJECTION, SOLUTION INTRAVENOUS CONTINUOUS PRN
Status: DISCONTINUED | OUTPATIENT
Start: 2019-09-03 | End: 2019-09-03 | Stop reason: SDUPTHER

## 2019-09-03 RX ORDER — LABETALOL HYDROCHLORIDE 5 MG/ML
10 INJECTION, SOLUTION INTRAVENOUS EVERY 30 MIN PRN
Status: DISCONTINUED | OUTPATIENT
Start: 2019-09-03 | End: 2019-09-04

## 2019-09-03 RX ORDER — LIDOCAINE HYDROCHLORIDE 20 MG/ML
INJECTION, SOLUTION INTRAVENOUS PRN
Status: DISCONTINUED | OUTPATIENT
Start: 2019-09-03 | End: 2019-09-03 | Stop reason: SDUPTHER

## 2019-09-03 RX ORDER — MIDAZOLAM HYDROCHLORIDE 1 MG/ML
INJECTION INTRAMUSCULAR; INTRAVENOUS PRN
Status: DISCONTINUED | OUTPATIENT
Start: 2019-09-03 | End: 2019-09-03 | Stop reason: SDUPTHER

## 2019-09-03 RX ADMIN — LABETALOL HYDROCHLORIDE 10 MG: 5 INJECTION INTRAVENOUS at 01:42

## 2019-09-03 RX ADMIN — HYDRALAZINE HYDROCHLORIDE 10 MG: 20 INJECTION INTRAMUSCULAR; INTRAVENOUS at 01:14

## 2019-09-03 RX ADMIN — SODIUM CHLORIDE, SODIUM LACTATE, POTASSIUM CHLORIDE, CALCIUM CHLORIDE AND DEXTROSE MONOHYDRATE: 5; 600; 310; 30; 20 INJECTION, SOLUTION INTRAVENOUS at 07:14

## 2019-09-03 RX ADMIN — FENTANYL CITRATE 50 MCG: 50 INJECTION, SOLUTION INTRAMUSCULAR; INTRAVENOUS at 17:02

## 2019-09-03 RX ADMIN — Medication 3 MG: at 17:39

## 2019-09-03 RX ADMIN — SODIUM CHLORIDE, POTASSIUM CHLORIDE, SODIUM LACTATE AND CALCIUM CHLORIDE: 600; 310; 30; 20 INJECTION, SOLUTION INTRAVENOUS at 16:33

## 2019-09-03 RX ADMIN — HYDROCORTISONE SODIUM SUCCINATE 100 MG: 100 INJECTION, POWDER, FOR SOLUTION INTRAMUSCULAR; INTRAVENOUS at 05:12

## 2019-09-03 RX ADMIN — LIDOCAINE HYDROCHLORIDE 80 MG: 20 INJECTION, SOLUTION INTRAVENOUS at 16:49

## 2019-09-03 RX ADMIN — MORPHINE SULFATE 2 MG: 2 INJECTION, SOLUTION INTRAMUSCULAR; INTRAVENOUS at 21:38

## 2019-09-03 RX ADMIN — CEFAZOLIN 2000 MG: 1 INJECTION, POWDER, FOR SOLUTION INTRAMUSCULAR; INTRAVENOUS at 16:53

## 2019-09-03 RX ADMIN — ROCURONIUM BROMIDE 20 MG: 10 INJECTION, SOLUTION INTRAVENOUS at 16:49

## 2019-09-03 RX ADMIN — INSULIN LISPRO 2 UNITS: 100 INJECTION, SOLUTION INTRAVENOUS; SUBCUTANEOUS at 05:12

## 2019-09-03 RX ADMIN — HYDROCORTISONE SODIUM SUCCINATE 50 MG: 100 INJECTION, POWDER, FOR SOLUTION INTRAMUSCULAR; INTRAVENOUS at 21:29

## 2019-09-03 RX ADMIN — MIDAZOLAM HYDROCHLORIDE 1 MG: 1 INJECTION, SOLUTION INTRAMUSCULAR; INTRAVENOUS at 16:36

## 2019-09-03 RX ADMIN — FENTANYL CITRATE 50 MCG: 50 INJECTION, SOLUTION INTRAMUSCULAR; INTRAVENOUS at 16:49

## 2019-09-03 RX ADMIN — Medication 10 ML: at 09:12

## 2019-09-03 RX ADMIN — Medication 10 ML: at 21:29

## 2019-09-03 RX ADMIN — HYDROCORTISONE SODIUM SUCCINATE 50 MG: 100 INJECTION, POWDER, FOR SOLUTION INTRAMUSCULAR; INTRAVENOUS at 14:00

## 2019-09-03 RX ADMIN — Medication 0.6 MG: at 17:39

## 2019-09-03 RX ADMIN — MORPHINE SULFATE 2 MG: 2 INJECTION, SOLUTION INTRAMUSCULAR; INTRAVENOUS at 01:42

## 2019-09-03 RX ADMIN — ONDANSETRON HYDROCHLORIDE 4 MG: 2 INJECTION, SOLUTION INTRAMUSCULAR; INTRAVENOUS at 17:22

## 2019-09-03 RX ADMIN — PROPOFOL 100 MG: 10 INJECTION, EMULSION INTRAVENOUS at 16:49

## 2019-09-03 RX ADMIN — HYDRALAZINE HYDROCHLORIDE 10 MG: 20 INJECTION INTRAMUSCULAR; INTRAVENOUS at 02:57

## 2019-09-03 RX ADMIN — DEXAMETHASONE SODIUM PHOSPHATE 10 MG: 10 INJECTION INTRAMUSCULAR; INTRAVENOUS at 16:49

## 2019-09-03 ASSESSMENT — PULMONARY FUNCTION TESTS
PIF_VALUE: 18
PIF_VALUE: 18
PIF_VALUE: 19
PIF_VALUE: 18
PIF_VALUE: 13
PIF_VALUE: 10
PIF_VALUE: 19
PIF_VALUE: 21
PIF_VALUE: 1
PIF_VALUE: 19
PIF_VALUE: 1
PIF_VALUE: 10
PIF_VALUE: 22
PIF_VALUE: 10
PIF_VALUE: 20
PIF_VALUE: 21
PIF_VALUE: 1
PIF_VALUE: 21
PIF_VALUE: 19
PIF_VALUE: 1
PIF_VALUE: 19
PIF_VALUE: 22
PIF_VALUE: 19
PIF_VALUE: 14
PIF_VALUE: 3
PIF_VALUE: 10
PIF_VALUE: 22
PIF_VALUE: 2
PIF_VALUE: 3
PIF_VALUE: 22
PIF_VALUE: 3
PIF_VALUE: 21
PIF_VALUE: 13
PIF_VALUE: 24
PIF_VALUE: 1
PIF_VALUE: 1
PIF_VALUE: 17
PIF_VALUE: 1
PIF_VALUE: 19
PIF_VALUE: 20
PIF_VALUE: 13
PIF_VALUE: 1
PIF_VALUE: 2
PIF_VALUE: 19
PIF_VALUE: 1
PIF_VALUE: 1
PIF_VALUE: 8
PIF_VALUE: 3
PIF_VALUE: 2
PIF_VALUE: 1
PIF_VALUE: 20
PIF_VALUE: 8
PIF_VALUE: 1
PIF_VALUE: 22
PIF_VALUE: 18
PIF_VALUE: 21
PIF_VALUE: 21
PIF_VALUE: 1
PIF_VALUE: 19
PIF_VALUE: 21
PIF_VALUE: 13
PIF_VALUE: 1
PIF_VALUE: 20
PIF_VALUE: 18
PIF_VALUE: 1
PIF_VALUE: 11
PIF_VALUE: 10

## 2019-09-03 ASSESSMENT — PAIN DESCRIPTION - FREQUENCY: FREQUENCY: CONTINUOUS

## 2019-09-03 ASSESSMENT — PAIN SCALES - GENERAL
PAINLEVEL_OUTOF10: 0
PAINLEVEL_OUTOF10: 0
PAINLEVEL_OUTOF10: 8
PAINLEVEL_OUTOF10: 0
PAINLEVEL_OUTOF10: 0
PAINLEVEL_OUTOF10: 10
PAINLEVEL_OUTOF10: 0
PAINLEVEL_OUTOF10: 0
PAINLEVEL_OUTOF10: 3

## 2019-09-03 ASSESSMENT — PAIN DESCRIPTION - ONSET: ONSET: ON-GOING

## 2019-09-03 ASSESSMENT — PAIN DESCRIPTION - ORIENTATION
ORIENTATION: MID
ORIENTATION: MID

## 2019-09-03 ASSESSMENT — PAIN DESCRIPTION - DESCRIPTORS: DESCRIPTORS: ACHING;DISCOMFORT;SORE

## 2019-09-03 ASSESSMENT — LIFESTYLE VARIABLES: SMOKING_STATUS: 0

## 2019-09-03 ASSESSMENT — PAIN DESCRIPTION - PAIN TYPE
TYPE: ACUTE PAIN;SURGICAL PAIN

## 2019-09-03 ASSESSMENT — PAIN DESCRIPTION - LOCATION
LOCATION: ABDOMEN

## 2019-09-03 NOTE — PROGRESS NOTES
[address] [facility]  Internal Medicine Residency / [address]    Attending Physician Statement  I have discussed the case, including pertinent history and exam findings with the resident and the team.  I have seen and examined the patient and the key elements of the encounter have been performed by me. I agree with the assessment, plan and orders as documented by the resident. Post op bowel resection for ischemic necrotic bowel  A&O   VS stable  Meds reviewed   NG in place  Abdomen soft   Plan: Continue same post op care  Remainder of medical problems as per resident note.       Faisal Reza  Internal Medicine Residency Faculty

## 2019-09-03 NOTE — PROGRESS NOTES
Follow visit to patients room. Patient states she is going for surgery today at noon. Patient states she has no needs at this time. I will continue to follow.

## 2019-09-03 NOTE — ANESTHESIA PRE PROCEDURE
8/31/2019 XR chest  Findings: An endotracheal tube, NG tube and central venous catheters are noted. The heart is unremarkable. The lung fields demonstrate evidence for airspace disease. The aorta is tortuous and ectatic.           Impression   tortuous ectatic aorta   Airspace disease compatible with atelectasis or pneumonia   There are patchy infiltrates seen throughout both the lung fields. Underlying edema could also have this appearance   The chest does not appear to be significantly changed in the interval         8/27/2019 CT ABD     FINDINGS:   The examination is limited due to the lack of contrast. The lung bases   demonstrate scarring and mild  COPD. Small hiatal hernia is present. Liver is decreased in attenuation likely fatty infiltrated. Branching   pattern of air is identified in the peripheral liver concerning for   portal venous air. There is previous cholecystectomy. Spleen,   pancreas, the adrenals and the kidneys are normal. There is   significant calcification of aorta and iliac arteries. Severe   compression deformity of L2 with the retropulsion of bony fragments   with  compromise of spinal canal is noted without significant change.       Pelvis. Bladder is distended. There is inflammation and thickening of   the cecum and ascending colon with a fluid-filled dilated bowel loops   in the right lower quadrant. There is air collection and air bubbles   in the periphery of the bowel loops in the right lower quadrant   concerning for pneumatosis of bowel loops/colon.           Impression   Inflammatory changes with wall thickening and edema in the cecum and   ascending colon as well as a dilated bowel loop in the right lower   quadrant with the suggestion of pneumatosis. There is also suggestion   of portal venous air concerning for severe inflammation. Consideration   should be given to severe enterocolitis in the right lower quadrant.    Examination is otherwise limited due to lack

## 2019-09-03 NOTE — PLAN OF CARE
Problem: Falls - Risk of:  Goal: Will remain free from falls  Description  Will remain free from falls  9/3/2019 0421 by Clarice Vinson RN  Outcome: Met This Shift  9/2/2019 1728 by Joseluis Quevedo RN  Outcome: Met This Shift  Goal: Absence of physical injury  Description  Absence of physical injury  Outcome: Met This Shift     Problem: Risk for Impaired Skin Integrity  Goal: Tissue integrity - skin and mucous membranes  Description  Structural intactness and normal physiological function of skin and  mucous membranes.   9/3/2019 0421 by Clarice Vinson RN  Outcome: Met This Shift  9/2/2019 1728 by Joseluis Quevedo RN  Outcome: Met This Shift

## 2019-09-03 NOTE — PROGRESS NOTES
COMPARISON: 2019 FINDINGS: The examination is limited due to the lack of contrast. The lung bases demonstrate scarring and mild  COPD. Small hiatal hernia is present. Liver is decreased in attenuation likely fatty infiltrated. Branching pattern of air is identified in the peripheral liver concerning for portal venous air. There is previous cholecystectomy. Spleen, pancreas, the adrenals and the kidneys are normal. There is significant calcification of aorta and iliac arteries. Severe compression deformity of L2 with the retropulsion of bony fragments with  compromise of spinal canal is noted without significant change. Pelvis. Bladder is distended. There is inflammation and thickening of the cecum and ascending colon with a fluid-filled dilated bowel loops in the right lower quadrant. There is air collection and air bubbles in the periphery of the bowel loops in the right lower quadrant concerning for pneumatosis of bowel loops/colon. Inflammatory changes with wall thickening and edema in the cecum and ascending colon as well as a dilated bowel loop in the right lower quadrant with the suggestion of pneumatosis. There is also suggestion of portal venous air concerning for severe inflammation. Consideration should be given to severe enterocolitis in the right lower quadrant. Examination is otherwise limited due to lack of contrast. ALERT:  THIS IS AN ABNORMAL REPORT     Xr Chest Portable    Result Date: 2019  Patient MRN: [id number] : 1935 Age:  81 years Gender: Female Order Date: 2019 6:00 AM Exam: XR CHEST PORTABLE Number of Images: 1 view Indication:   intubated intubated acute respiratory failure Comparison: 2019 Findings: An endotracheal tube, NG tube and central venous catheters are noted. The heart is unremarkable. The lung fields demonstrate evidence for airspace disease.  The aorta is tortuous and ectatic.     tortuous ectatic aorta Airspace disease compatible with atelectasis or

## 2019-09-04 PROCEDURE — 2580000003 HC RX 258: Performed by: SURGERY

## 2019-09-04 PROCEDURE — 1200000000 HC SEMI PRIVATE

## 2019-09-04 PROCEDURE — 6360000002 HC RX W HCPCS: Performed by: STUDENT IN AN ORGANIZED HEALTH CARE EDUCATION/TRAINING PROGRAM

## 2019-09-04 PROCEDURE — 2580000003 HC RX 258: Performed by: STUDENT IN AN ORGANIZED HEALTH CARE EDUCATION/TRAINING PROGRAM

## 2019-09-04 PROCEDURE — 2580000003 HC RX 258

## 2019-09-04 PROCEDURE — 99232 SBSQ HOSP IP/OBS MODERATE 35: CPT | Performed by: SURGERY

## 2019-09-04 PROCEDURE — 6360000002 HC RX W HCPCS: Performed by: SURGERY

## 2019-09-04 RX ADMIN — MORPHINE SULFATE 2 MG: 2 INJECTION, SOLUTION INTRAMUSCULAR; INTRAVENOUS at 00:47

## 2019-09-04 RX ADMIN — MORPHINE SULFATE 2 MG: 2 INJECTION, SOLUTION INTRAMUSCULAR; INTRAVENOUS at 20:36

## 2019-09-04 RX ADMIN — Medication 10 ML: at 10:10

## 2019-09-04 RX ADMIN — Medication 10 ML: at 20:36

## 2019-09-04 RX ADMIN — HYDROCORTISONE SODIUM SUCCINATE 50 MG: 100 INJECTION, POWDER, FOR SOLUTION INTRAMUSCULAR; INTRAVENOUS at 06:00

## 2019-09-04 RX ADMIN — WATER 10 ML: 1 INJECTION INTRAMUSCULAR; INTRAVENOUS; SUBCUTANEOUS at 20:36

## 2019-09-04 RX ADMIN — HYDROCORTISONE SODIUM SUCCINATE 25 MG: 100 INJECTION, POWDER, FOR SOLUTION INTRAMUSCULAR; INTRAVENOUS at 20:35

## 2019-09-04 ASSESSMENT — PAIN SCALES - GENERAL
PAINLEVEL_OUTOF10: 0
PAINLEVEL_OUTOF10: 0
PAINLEVEL_OUTOF10: 8
PAINLEVEL_OUTOF10: 10
PAINLEVEL_OUTOF10: 3
PAINLEVEL_OUTOF10: 0

## 2019-09-04 ASSESSMENT — PAIN DESCRIPTION - LOCATION
LOCATION: ABDOMEN

## 2019-09-04 ASSESSMENT — PAIN DESCRIPTION - DESCRIPTORS: DESCRIPTORS: ACHING;DISCOMFORT;SORE

## 2019-09-04 ASSESSMENT — PAIN DESCRIPTION - PROGRESSION: CLINICAL_PROGRESSION: NOT CHANGED

## 2019-09-04 ASSESSMENT — PAIN DESCRIPTION - PAIN TYPE
TYPE: ACUTE PAIN;SURGICAL PAIN

## 2019-09-04 ASSESSMENT — PAIN DESCRIPTION - ORIENTATION: ORIENTATION: MID

## 2019-09-04 ASSESSMENT — PAIN DESCRIPTION - ONSET
ONSET: ON-GOING
ONSET: ON-GOING

## 2019-09-04 ASSESSMENT — PAIN DESCRIPTION - FREQUENCY
FREQUENCY: CONTINUOUS
FREQUENCY: CONTINUOUS

## 2019-09-04 NOTE — PROGRESS NOTES
[address] [facility]  Internal Medicine Residency / [address]    Attending Physician Statement  I have discussed the case, including pertinent history and exam findings with the resident and the team.  I have seen and examined the patient and the key elements of the encounter have been performed by me. I agree with the assessment, plan and orders as documented by the resident. A&O  VS stable  H&L clear   Abdomen soft  Ostomy draining  meds reviewed  MArked improvement since surgery  Plan: Transfer            BUTCH later  Remainder of medical problems as per resident note.       Froy Madsen  Internal Medicine Residency Faculty

## 2019-09-04 NOTE — PROGRESS NOTES
Follow up visit to patients room in SICU- patient was being transferred to [facility]. I did meet with patient in her new room. I discussed with her who is making decisions for her if she was not able to. Patient states no one but herself is making decisions. I questioned about niece [place]. Patient states [place] is not to make any decisions for her and that she is not her niece as [place] was patients previous husbands niece. I asked about sisters and patient states she has several sisters all over the country but would not give any information regarding those. I asked about where patient would go at discharge. Patient states she is only going home and no where else not even short term and that her friends from [employer] will help her. Patient states she does not want hospice to follow at home, that her [employer] friends will do everything for her. I explained that I was trying to ensure that patient has a safe discharge plan and that she will need 24 hour care upon discharge. Patient again states that her [employer] friends can care for her as she can let them in to her apartment when they arrive. I explained that due to her abdominal surgery she may not be able to get out of bed and that is why she would need 24 hour care. Patient states one of the [employer] friends can live with her. Patient became agitated and asked that I just leave her alone. I updated SW and CM. I will continue to follow.

## 2019-09-04 NOTE — PROGRESS NOTES
Coordination of care discussion and chart review with PM team. LSW met with pt , after some time was able to verify with pt that she does have a sister [employer], explained that her sister has been contactede. Pt agreeable to this. She also states , tell her \"not to worry about me, my [employer] here will take care of me\" LSW attempted to facilitate discussion about goals of care, hospice. Pt became upset and asked SW to leave.  will remain available for on-going psychosocial support, as needed.

## 2019-09-04 NOTE — PROGRESS NOTES
CDIFPCR  Wound culture/abscess: No results for input(s): WNDABS in the last 72 hours. Tip culture:No results for input(s): CXCATHTIP in the last 72 hours. Ct Abdomen Pelvis Wo Contrast    Result Date: 2019  Patient MRN:  [id number] : 1935 Age: 81 years Gender: Female Order Date:  2019 9:00 AM EXAM: CT ABDOMEN PELVIS WO CONTRAST number of images 296 Technique: Low-dose CT  acquisition technique included one of following options; 1 . Automated exposure control, 2. Adjustment of MA and or KV according to patient's size or 3. Use of iterative reconstruction. INDICATION:  abd pain  COMPARISON: 2019 FINDINGS: The examination is limited due to the lack of contrast. The lung bases demonstrate scarring and mild  COPD. Small hiatal hernia is present. Liver is decreased in attenuation likely fatty infiltrated. Branching pattern of air is identified in the peripheral liver concerning for portal venous air. There is previous cholecystectomy. Spleen, pancreas, the adrenals and the kidneys are normal. There is significant calcification of aorta and iliac arteries. Severe compression deformity of L2 with the retropulsion of bony fragments with  compromise of spinal canal is noted without significant change. Pelvis. Bladder is distended. There is inflammation and thickening of the cecum and ascending colon with a fluid-filled dilated bowel loops in the right lower quadrant. There is air collection and air bubbles in the periphery of the bowel loops in the right lower quadrant concerning for pneumatosis of bowel loops/colon. Inflammatory changes with wall thickening and edema in the cecum and ascending colon as well as a dilated bowel loop in the right lower quadrant with the suggestion of pneumatosis. There is also suggestion of portal venous air concerning for severe inflammation. Consideration should be given to severe enterocolitis in the right lower quadrant.  Examination is otherwise limited due to lack of contrast. ALERT:  THIS IS AN ABNORMAL REPORT     Xr Chest Portable    Result Date: 2019  Patient MRN: [id number] : 1935 Age:  81 years Gender: Female Order Date: 2019 6:00 AM Exam: XR CHEST PORTABLE Number of Images: 1 view Indication:   intubated intubated acute respiratory failure Comparison: 2019 Findings: An endotracheal tube, NG tube and central venous catheters are noted. The heart is unremarkable. The lung fields demonstrate evidence for airspace disease. The aorta is tortuous and ectatic.     tortuous ectatic aorta Airspace disease compatible with atelectasis or pneumonia There are patchy infiltrates seen throughout both the lung fields. Underlying edema could also have this appearance The chest does not appear to be significantly changed in the interval     Xr Chest Portable    Result Date: 2019  Patient MRN:  [id number] : 1935 Age: 81 years Gender: Female Order Date:  2019 6:00 AM EXAM: XR CHEST PORTABLE one image INDICATION:  intubated intubated COMPARISON: 2019 FINDINGS: There is borderline cardiac size. Endotracheal tube, nasogastric tube, and right IJ central line are unchanged. There is vascular congestion with the persistent slightly progressive atelectasis and pleural effusions in the lung bases. There is severe deformity of the left shoulder. Progressive CHF. Xr Chest Portable    Result Date: 2019  Patient MRN:  [id number] : 1935 Age: 81 years Gender: Female Order Date:  2019 6:00 AM EXAM: XR CHEST PORTABLE one image INDICATION:  intubated intubated COMPARISON: 2019 FINDINGS: The heart and the mediastinal structures are normal. Endotracheal tube, right IJ central line, and a nasogastric tube are unchanged. There is no focal consolidation or effusion. There is severe deformity of the left shoulder. There is underlying COPD.      Stable nonacute chest.     Xr Chest Portable    Result Date: 2019  Patient

## 2019-09-05 LAB
METER GLUCOSE: 130 MG/DL (ref 74–99)
METER GLUCOSE: 130 MG/DL (ref 74–99)
METER GLUCOSE: 144 MG/DL (ref 74–99)

## 2019-09-05 PROCEDURE — 1200000000 HC SEMI PRIVATE

## 2019-09-05 PROCEDURE — 6360000002 HC RX W HCPCS: Performed by: STUDENT IN AN ORGANIZED HEALTH CARE EDUCATION/TRAINING PROGRAM

## 2019-09-05 PROCEDURE — 6360000002 HC RX W HCPCS: Performed by: FAMILY MEDICINE

## 2019-09-05 PROCEDURE — 82962 GLUCOSE BLOOD TEST: CPT

## 2019-09-05 PROCEDURE — 2580000003 HC RX 258: Performed by: SURGERY

## 2019-09-05 PROCEDURE — 99231 SBSQ HOSP IP/OBS SF/LOW 25: CPT | Performed by: INTERNAL MEDICINE

## 2019-09-05 RX ORDER — DEXTROSE MONOHYDRATE 50 MG/ML
100 INJECTION, SOLUTION INTRAVENOUS PRN
Status: DISCONTINUED | OUTPATIENT
Start: 2019-09-05 | End: 2019-09-06 | Stop reason: HOSPADM

## 2019-09-05 RX ORDER — NICOTINE POLACRILEX 4 MG
15 LOZENGE BUCCAL PRN
Status: DISCONTINUED | OUTPATIENT
Start: 2019-09-05 | End: 2019-09-06 | Stop reason: HOSPADM

## 2019-09-05 RX ORDER — DEXTROSE MONOHYDRATE 25 G/50ML
12.5 INJECTION, SOLUTION INTRAVENOUS PRN
Status: DISCONTINUED | OUTPATIENT
Start: 2019-09-05 | End: 2019-09-06 | Stop reason: HOSPADM

## 2019-09-05 RX ADMIN — Medication 10 ML: at 21:47

## 2019-09-05 RX ADMIN — Medication 10 ML: at 09:19

## 2019-09-05 RX ADMIN — HYDROCORTISONE SODIUM SUCCINATE 25 MG: 100 INJECTION, POWDER, FOR SOLUTION INTRAMUSCULAR; INTRAVENOUS at 09:16

## 2019-09-05 RX ADMIN — TRIMETHOBENZAMIDE HYDROCHLORIDE 200 MG: 100 INJECTION INTRAMUSCULAR at 09:15

## 2019-09-05 ASSESSMENT — PAIN SCALES - GENERAL
PAINLEVEL_OUTOF10: 0

## 2019-09-05 NOTE — CARE COORDINATION
9/5/2019 social work transition of care  [facility] followed up with [employer] to talk to Ham about decision making and [facility] declined referral. [facility] will follow p with Denise about other [facility] choices.   Electronically signed by GLORY Sinclair on 9/5/2019 at 8:53 AM

## 2019-09-05 NOTE — PROGRESS NOTES
and mild  COPD. Small hiatal hernia is present. Liver is decreased in attenuation likely fatty infiltrated. Branching pattern of air is identified in the peripheral liver concerning for portal venous air. There is previous cholecystectomy. Spleen, pancreas, the adrenals and the kidneys are normal. There is significant calcification of aorta and iliac arteries. Severe compression deformity of L2 with the retropulsion of bony fragments with  compromise of spinal canal is noted without significant change. Pelvis. Bladder is distended. There is inflammation and thickening of the cecum and ascending colon with a fluid-filled dilated bowel loops in the right lower quadrant. There is air collection and air bubbles in the periphery of the bowel loops in the right lower quadrant concerning for pneumatosis of bowel loops/colon. Inflammatory changes with wall thickening and edema in the cecum and ascending colon as well as a dilated bowel loop in the right lower quadrant with the suggestion of pneumatosis. There is also suggestion of portal venous air concerning for severe inflammation. Consideration should be given to severe enterocolitis in the right lower quadrant. Examination is otherwise limited due to lack of contrast. ALERT:  THIS IS AN ABNORMAL REPORT     Xr Chest Portable    Result Date: 2019  Patient MRN: [id number] : 1935 Age:  81 years Gender: Female Order Date: 2019 6:00 AM Exam: XR CHEST PORTABLE Number of Images: 1 view Indication:   intubated intubated acute respiratory failure Comparison: 2019 Findings: An endotracheal tube, NG tube and central venous catheters are noted. The heart is unremarkable. The lung fields demonstrate evidence for airspace disease. The aorta is tortuous and ectatic.     tortuous ectatic aorta Airspace disease compatible with atelectasis or pneumonia There are patchy infiltrates seen throughout both the lung fields.  Underlying edema could also have this

## 2019-09-06 VITALS
SYSTOLIC BLOOD PRESSURE: 157 MMHG | HEIGHT: 63 IN | RESPIRATION RATE: 16 BRPM | DIASTOLIC BLOOD PRESSURE: 89 MMHG | OXYGEN SATURATION: 96 % | WEIGHT: 130 LBS | TEMPERATURE: 98.6 F | HEART RATE: 85 BPM | BODY MASS INDEX: 23.04 KG/M2

## 2019-09-06 LAB — METER GLUCOSE: 118 MG/DL (ref 74–99)

## 2019-09-06 PROCEDURE — 6360000002 HC RX W HCPCS: Performed by: FAMILY MEDICINE

## 2019-09-06 PROCEDURE — 82962 GLUCOSE BLOOD TEST: CPT

## 2019-09-06 PROCEDURE — 99231 SBSQ HOSP IP/OBS SF/LOW 25: CPT | Performed by: INTERNAL MEDICINE

## 2019-09-06 PROCEDURE — 97165 OT EVAL LOW COMPLEX 30 MIN: CPT

## 2019-09-06 PROCEDURE — 97161 PT EVAL LOW COMPLEX 20 MIN: CPT

## 2019-09-06 PROCEDURE — 97530 THERAPEUTIC ACTIVITIES: CPT

## 2019-09-06 PROCEDURE — 6360000002 HC RX W HCPCS: Performed by: STUDENT IN AN ORGANIZED HEALTH CARE EDUCATION/TRAINING PROGRAM

## 2019-09-06 RX ADMIN — TRIMETHOBENZAMIDE HYDROCHLORIDE 200 MG: 100 INJECTION INTRAMUSCULAR at 01:10

## 2019-09-06 RX ADMIN — MORPHINE SULFATE 2 MG: 2 INJECTION, SOLUTION INTRAMUSCULAR; INTRAVENOUS at 01:09

## 2019-09-06 ASSESSMENT — PAIN DESCRIPTION - LOCATION: LOCATION: ABDOMEN

## 2019-09-06 ASSESSMENT — PAIN DESCRIPTION - ORIENTATION: ORIENTATION: MID

## 2019-09-06 ASSESSMENT — PAIN DESCRIPTION - FREQUENCY: FREQUENCY: CONTINUOUS

## 2019-09-06 ASSESSMENT — PAIN SCALES - GENERAL
PAINLEVEL_OUTOF10: 6
PAINLEVEL_OUTOF10: 0
PAINLEVEL_OUTOF10: 0

## 2019-09-06 ASSESSMENT — PAIN DESCRIPTION - PROGRESSION: CLINICAL_PROGRESSION: NOT CHANGED

## 2019-09-06 ASSESSMENT — PAIN DESCRIPTION - DESCRIPTORS: DESCRIPTORS: ACHING;CONSTANT;DISCOMFORT

## 2019-09-06 ASSESSMENT — PAIN DESCRIPTION - ONSET: ONSET: ON-GOING

## 2019-09-06 ASSESSMENT — PAIN DESCRIPTION - PAIN TYPE: TYPE: SURGICAL PAIN

## 2019-09-06 NOTE — CARE COORDINATION
9/6/2019 social work transition of care  Sw set up ambulance with [facility] to [facility] for 8 pm. Sw notified charge nurse, facility liaison and pt at bedside.   Electronically signed by GLORY Adler on 9/6/2019 at 1:38 PM

## 2019-09-06 NOTE — DISCHARGE SUMMARY
Discharge Summary Note  Patient ID:  Cephus Essex  [id number]  31 y.o.  1935    Admit date: 8/27/2019    Discharge date and time: No discharge date for patient encounter. Admitting Physician: Yvon Rodriguez MD     Discharge Physician: David Buck MD    Admission Diagnoses:   Pneumatosis intestinalis [K63.89]  Pneumatosis intestinalis [K63.89]    Discharge Diagnoses:   1. Pneumatosis intestinalis  2. H/o glaucoma  3. HTN  4. DM2    Admission Condition: poor    Discharged Condition: poor    Hospital Course: admitted 8/27 for worsening abdominal pain and inability to eat. In the ED, was afebrile with WBC 12.9, LA 1.6. CT abdomen/pelvis showed inflammatory changes with wall thickening and edema in the cecum and ascending colon as well as a dilated bowel loop in the RLQ with a suggestion of pneumatosis. Gen surg evaluated pt in the ED and recommended diagnostic laparoscopy but pt refused. Received Flagyl and rocephin. Flagyl continued from ED. Pt consented to surgical intervention and was taken to OR on 8/28. Found to have ischemic gut with necrotic cecum. Right hemicolectomy and small bowel resection was performed and pt was left in discontinuity with wound vac in place. Was transferred to SICU for further management. Vascular surgery was consulted and took pt to OR on 8/29 for superior mesenteric arteriogram, angioplasty and stenting. Pt was again taken back to OR on 8/30 for 2nd look laparotomy. Found to have completely necrotic bowel except for about 6 inches from the Ligament of Treitz, which was removed, in addition to a small portion of the transverse colon. Palliative care was consulted for goals of care and code status. Pt was changed to [facility] initially but was ultimately made DNR-CC. However pt was not  Interested in hospice upon discharge and did not want home hospice. Nephrology had been consulted for worsening kidney function as Cr continued to rise to 2.7.  Started swelling    Disposition: to [facility]    Patient Instructions: per [facility]    Discharge Medications:   Pedro Pablo Maldonado Medication Instructions [id number]    Printed on:09/06/19 1318   Medication Information                      latanoprost (XALATAN) 0.005 % ophthalmic solution  Place 1 drop into both eyes nightly             LUMIGAN 0.01 % SOLN ophthalmic drops  PLACE 1 GTT INTO OU QHS                 Activity: activity as tolerated    Diet: clear liquids, advance as tolerated    Wound Care: per general sugery    Follow-up:  After d/c from [facility]    Electronically signed by Katie Killian MD on 9/6/19 at 1:18 PM

## 2019-09-06 NOTE — PROGRESS NOTES
Nutrition Assessment    Type and Reason for Visit: Reassess, Consult(D/w resident )    Nutrition Recommendations: Would recommend advancing diet to General to allow patient to eat as able and add ONS TID as able to be tolerated by patient w/ high risk of refeeding syndrome w/ minimal nutrition x 10 days. Patient w/ current short bowel syndrome, and physiologically unable to absorb nutrients from food adequately. Nutrition Assessment: Patient continues w/ severe malnutrition w/ minimal nutrition since admit x 10 days and noted SBS and current CLD. Patient physiologically unable to absorb nutrients adequately and now at risk for refeeding syndrome. Reccomend General Diet for patient and adding ONS as tolerated. Malnutrition Assessment:  · Malnutrition Status: Meets the criteria for severe malnutrition  · Context: Chronic illness  · Findings of the 6 clinical characteristics of malnutrition (Minimum of 2 out of 6 clinical characteristics is required to make the diagnosis of moderate or severe Protein Calorie Malnutrition based on AND/ASPEN Guidelines):  1. Energy Intake-Less than or equal to 50% of estimated energy requirement, Greater than or equal to 3 months    2. Weight Loss-10% loss or greater, in 1 year  3. Fat Loss-Severe subcutaneous fat loss, Orbital  4. Muscle Loss-Severe muscle mass loss, Temples (temporalis muscle), Clavicles (pectoralis and deltoids), Thigh (quadriceps)  5. Fluid Accumulation-No significant fluid accumulation,    6.   Strength-Not measured    Nutrition Risk Level: High    Nutrient Needs:  · Estimated Daily Total Kcal: 3941-0907(MSJ: 889 x 1.4 )  · Estimated Daily Protein (g): 70-80(1.5-1.8 )  · Estimated Daily Total Fluid (ml/day): 1200ml     Nutrition Diagnosis:   · Problem: Severe malnutrition, In context of chronic illness  · Etiology: related to Alteration in GI function     Signs and symptoms:  as evidenced by Severe loss of subcutaneous fat, Severe muscle loss, Diet

## 2020-03-25 PROBLEM — S32.000A CLOSED COMPRESSION FRACTURE OF LUMBAR VERTEBRA (HCC): Status: RESOLVED | Noted: 2017-01-16 | Resolved: 2020-03-24

## (undated) DEVICE — SOLUTION IV IRRIG POUR BRL 0.9% SODIUM CHL 2F7124

## (undated) DEVICE — SET ENDO INSTR LAPAROSCOPIC STGENLAP

## (undated) DEVICE — GLOVE ORANGE PI 7   MSG9070

## (undated) DEVICE — BIT DRL L145MM DIA4.2MM ST 3 FLUT NDL PNT QUIK CPL FOR FEM

## (undated) DEVICE — DRAPE THER FLUID WARMING 66X44 IN FLAT SLUSH DBL DISC ORS

## (undated) DEVICE — PATIENT RETURN ELECTRODE, SINGLE-USE, CONTACT QUALITY MONITORING, ADULT, WITH 9FT CORD, FOR PATIENTS WEIGING OVER 33LBS. (15KG): Brand: MEGADYNE

## (undated) DEVICE — SET ORTHO STD STORTSTD2

## (undated) DEVICE — GOWN,AURORA,BRTHSLV,2XL,18/CS: Brand: MEDLINE

## (undated) DEVICE — SURGICAL PROCEDURE PACK BASIC

## (undated) DEVICE — GLOVE ORANGE PI 8   MSG9080

## (undated) DEVICE — INTENDED FOR TISSUE SEPARATION, AND OTHER PROCEDURES THAT REQUIRE A SHARP SURGICAL BLADE TO PUNCTURE OR CUT.: Brand: BARD-PARKER ® STAINLESS STEEL BLADES

## (undated) DEVICE — GLOVE SURG SZ 65 THK91MIL LTX FREE SYN POLYISOPRENE

## (undated) DEVICE — SET INSTRUMENT LAP II

## (undated) DEVICE — ROD RMR L950MM DIA2.5MM W/ EXTN BALL TIP

## (undated) DEVICE — PACK,UNIV, II AURORA: Brand: MEDLINE

## (undated) DEVICE — SPONGE LAP W18XL18IN WHT COT 4 PLY FLD STRUNG RADPQ DISP ST

## (undated) DEVICE — SKIN AFFIX SURG ADHESIVE 72/CS 0.55ML: Brand: MEDLINE

## (undated) DEVICE — RELOAD STPL L55MM OPN H3.8MM CLS H1.5MM WIRE DIA0.2MM REG

## (undated) DEVICE — SET INSTRUMENT LAP I

## (undated) DEVICE — GUIDEWIRE ORTH L400MM DIA3.2MM FOR TFN

## (undated) DEVICE — TOWEL,OR,DSP,ST,BLUE,STD,6/PK,12PK/CS: Brand: MEDLINE

## (undated) DEVICE — BANDAGE COMPR W4INXL10YD WHITE/BEIGE E MTRX HK LOOP CLSR

## (undated) DEVICE — 1.5L THIN WALL CAN: Brand: CRD

## (undated) DEVICE — SET ORTHO STD STORTSTD1

## (undated) DEVICE — DRIP REDUCTION MANIFOLD

## (undated) DEVICE — SYRINGE 20ML LL S/C 50

## (undated) DEVICE — SEALER TISS L35CM ADV BPLR STR TIP LAP APPRCH ENSEAL G2

## (undated) DEVICE — KIT,ANTI FOG,W/SPONGE & FLUID,SOFT PACK: Brand: MEDLINE

## (undated) DEVICE — DRESSING BORDERED ADH GZ UNIV GEN USE 8INX4IN AND 6INX2IN

## (undated) DEVICE — AGENT HEMSTAT 3GM OXIDIZED REGENERATED CELOS ABSRB FOR CONT (ORDER MULTIPLES OF 5EA)

## (undated) DEVICE — STAPLER SKIN L39MM DIA0.53MM CRWN 5.7MM S STL FIX HD PROX

## (undated) DEVICE — PADDING CAST W6INXL4YD COT LO LINTING WYTEX

## (undated) DEVICE — STAPLER SKIN LEG L3.9MM DIA0.53MM WIDE ROT HD FOR WND CLSR

## (undated) DEVICE — LAPAROSCOPIC SCISSORS: Brand: EPIX LAPAROSCOPIC SCISSORS

## (undated) DEVICE — DRAPE PATIENT ISOL IRRIG POUCH

## (undated) DEVICE — YANKAUER,POOLE TIP,STERILE,50/CS: Brand: MEDLINE

## (undated) DEVICE — TROCAR: Brand: KII FIOS FIRST ENTRY

## (undated) DEVICE — TOWEL,OR,DSP,ST,BLUE,DLX,10/PK,8PK/CS: Brand: MEDLINE

## (undated) DEVICE — CHLORAPREP 26ML ORANGE

## (undated) DEVICE — WARMER SCP LAP

## (undated) DEVICE — SOLUTION IV IRRIG WATER 1000ML POUR BRL 2F7114

## (undated) DEVICE — DRAPE C ARM W41XL74IN UNIV MOB W RUBBERBAND CLP

## (undated) DEVICE — STAPLER INT L55MM CUT LN L53MM STPL LN L57MM BLU B FRM 8

## (undated) DEVICE — INSUFFLATION TUBING SET WITH FILTER, FUNNEL CONNECTOR AND LUER LOCK: Brand: JOSNOE MEDICAL INC

## (undated) DEVICE — CANISTER NEG PRSS 1000ML W/ GEL INFOVAC

## (undated) DEVICE — GENERATOR ELECSURG FORCETRAID

## (undated) DEVICE — DRILL SYSTEM 7

## (undated) DEVICE — 3M™ TEGADERM™ TRANSPARENT FILM DRESSING FRAME STYLE, 1626W, 4 IN X 4-3/4 IN (10 CM X 12 CM), 50/CT 4CT/CASE: Brand: 3M™ TEGADERM™

## (undated) DEVICE — SURGICAL PROCEDURE PACK TRAUM

## (undated) DEVICE — STANDARD HYPODERMIC NEEDLE,ALUMINUM HUB: Brand: MONOJECT

## (undated) DEVICE — DRESSING,GAUZE,XEROFORM,CURAD,5"X9",ST: Brand: CURAD

## (undated) DEVICE — KIT DSG ABTHERA SENSATRAC

## (undated) DEVICE — SYRINGE MED 50ML LUERLOCK TIP

## (undated) DEVICE — COVER,TABLE,60X90,STERILE: Brand: MEDLINE

## (undated) DEVICE — TIBURON GENERAL ENDOSCOPY DRAPE: Brand: CONVERTORS

## (undated) DEVICE — RELOAD STPL L75MM OPN H3.8MM CLS 1.5MM WIRE DIA0.2MM REG

## (undated) DEVICE — 3M™ COBAN™ NL STERILE NON-LATEX SELF-ADHERENT WRAP, 2084S, 4 IN X 5 YD (10 CM X 4,5 M), 18 ROLLS/CASE: Brand: 3M™ COBAN™

## (undated) DEVICE — DRESSING NEG PRSS OPN ABD FEN VIS PROTCT LAYR PERF FOAM DRP 3706055S] KCI THERAPEUTIC SERVICES]

## (undated) DEVICE — SWABSTICK SURG PREP BENZOIN TINCTURE SINGLE ST

## (undated) DEVICE — Device

## (undated) DEVICE — SEALER TISS L20CM DIA13MM ADV BPLR L CRV JAW OPN APPRCH

## (undated) DEVICE — CONTROL SYRINGE LUER-LOCK TIP: Brand: MONOJECT

## (undated) DEVICE — GAUZE,SPONGE,4"X4",12PLY,STERILE,LF,2'S: Brand: MEDLINE

## (undated) DEVICE — GLOVE SURG SZ 75 STD WHT LTX SYN POLYMER BEAD REINF ANTI RL

## (undated) DEVICE — BANDAGE,GAUZE,4.5"X4.1YD,STERILE,LF: Brand: MEDLINE

## (undated) DEVICE — INSUFFLATION NEEDLE TO ESTABLISH PNEUMOPERITONEUM.: Brand: INSUFFLATION NEEDLE

## (undated) DEVICE — DOUBLE BASIN SET: Brand: MEDLINE INDUSTRIES, INC.

## (undated) DEVICE — TROCAR: Brand: KII® SLEEVE

## (undated) DEVICE — BLADE CLIPPER GEN PURP NS

## (undated) DEVICE — STANDARD HYPODERMIC NEEDLE,POLYPROPYLENE HUB: Brand: MONOJECT

## (undated) DEVICE — GOWN,BREATHABLE SLV,AURORA,XLG,STRL: Brand: MEDLINE

## (undated) DEVICE — SPONGE LAP ST XRAY 18X18

## (undated) DEVICE — CLOTH SURG PREP PREOPERATIVE CHLORHEXIDINE GLUC 2% READYPREP

## (undated) DEVICE — CAMERA STRYKER 1488 HD GEN

## (undated) DEVICE — TROCAR: Brand: KII SHIELDED BLADED ACCESS SYSTEM

## (undated) DEVICE — STAPLER INT L75MM CUT LN L73MM STPL LN L77MM BLU B FRM 8